# Patient Record
Sex: MALE | Race: WHITE | NOT HISPANIC OR LATINO | Employment: OTHER | URBAN - METROPOLITAN AREA
[De-identification: names, ages, dates, MRNs, and addresses within clinical notes are randomized per-mention and may not be internally consistent; named-entity substitution may affect disease eponyms.]

---

## 2017-01-01 ENCOUNTER — APPOINTMENT (OUTPATIENT)
Dept: RADIATION ONCOLOGY | Facility: HOSPITAL | Age: 62
End: 2017-01-01

## 2017-01-03 ENCOUNTER — INFUSION (OUTPATIENT)
Dept: ONCOLOGY | Facility: HOSPITAL | Age: 62
End: 2017-01-03

## 2017-01-03 ENCOUNTER — OFFICE VISIT (OUTPATIENT)
Dept: ONCOLOGY | Facility: CLINIC | Age: 62
End: 2017-01-03

## 2017-01-03 VITALS
WEIGHT: 239 LBS | RESPIRATION RATE: 16 BRPM | HEART RATE: 72 BPM | SYSTOLIC BLOOD PRESSURE: 120 MMHG | BODY MASS INDEX: 34.22 KG/M2 | TEMPERATURE: 98 F | DIASTOLIC BLOOD PRESSURE: 60 MMHG | HEIGHT: 70 IN

## 2017-01-03 DIAGNOSIS — C34.2 MALIGNANT NEOPLASM OF MIDDLE LOBE OF RIGHT LUNG (HCC): ICD-10-CM

## 2017-01-03 DIAGNOSIS — C34.2 MALIGNANT NEOPLASM OF MIDDLE LOBE OF RIGHT LUNG (HCC): Primary | ICD-10-CM

## 2017-01-03 LAB
ALBUMIN SERPL-MCNC: 3.5 G/DL (ref 3.5–5.2)
ALBUMIN/GLOB SERPL: 0.9 G/DL (ref 1.1–2.4)
ALP SERPL-CCNC: 70 U/L (ref 38–116)
ALT SERPL W P-5'-P-CCNC: 17 U/L (ref 0–41)
ANION GAP SERPL CALCULATED.3IONS-SCNC: 9.4 MMOL/L
AST SERPL-CCNC: 17 U/L (ref 0–40)
BASOPHILS # BLD AUTO: 0.05 10*3/MM3 (ref 0–0.1)
BASOPHILS NFR BLD AUTO: 0.9 % (ref 0–1.1)
BILIRUB SERPL-MCNC: 0.4 MG/DL (ref 0.1–1.2)
BUN BLD-MCNC: 21 MG/DL (ref 6–20)
BUN/CREAT SERPL: 31.3 (ref 7.3–30)
CALCIUM SPEC-SCNC: 9.1 MG/DL (ref 8.5–10.2)
CHLORIDE SERPL-SCNC: 94 MMOL/L (ref 98–107)
CO2 SERPL-SCNC: 34.6 MMOL/L (ref 22–29)
CREAT BLD-MCNC: 0.67 MG/DL (ref 0.7–1.3)
DEPRECATED RDW RBC AUTO: 52.3 FL (ref 37–49)
EOSINOPHIL # BLD AUTO: 0.02 10*3/MM3 (ref 0–0.36)
EOSINOPHIL NFR BLD AUTO: 0.4 % (ref 1–5)
ERYTHROCYTE [DISTWIDTH] IN BLOOD BY AUTOMATED COUNT: 17.7 % (ref 11.7–14.5)
GFR SERPL CREATININE-BSD FRML MDRD: 121 ML/MIN/1.73
GLOBULIN UR ELPH-MCNC: 3.8 GM/DL (ref 1.8–3.5)
GLUCOSE BLD-MCNC: 119 MG/DL (ref 74–124)
HCT VFR BLD AUTO: 43.8 % (ref 40–49)
HGB BLD-MCNC: 14.1 G/DL (ref 13.5–16.5)
IMM GRANULOCYTES # BLD: 0.04 10*3/MM3 (ref 0–0.03)
IMM GRANULOCYTES NFR BLD: 0.8 % (ref 0–0.5)
LYMPHOCYTES # BLD AUTO: 0.54 10*3/MM3 (ref 1–3.5)
LYMPHOCYTES NFR BLD AUTO: 10.2 % (ref 20–49)
MCH RBC QN AUTO: 28.3 PG (ref 27–33)
MCHC RBC AUTO-ENTMCNC: 32.2 G/DL (ref 32–35)
MCV RBC AUTO: 88 FL (ref 83–97)
MONOCYTES # BLD AUTO: 0.77 10*3/MM3 (ref 0.25–0.8)
MONOCYTES NFR BLD AUTO: 14.5 % (ref 4–12)
NEUTROPHILS # BLD AUTO: 3.88 10*3/MM3 (ref 1.5–7)
NEUTROPHILS NFR BLD AUTO: 73.2 % (ref 39–75)
NRBC BLD MANUAL-RTO: 0 /100 WBC (ref 0–0)
PLATELET # BLD AUTO: 319 10*3/MM3 (ref 150–375)
PMV BLD AUTO: 9 FL (ref 8.9–12.1)
POTASSIUM BLD-SCNC: 3.6 MMOL/L (ref 3.5–4.7)
PROT SERPL-MCNC: 7.3 G/DL (ref 6.3–8)
RBC # BLD AUTO: 4.98 10*6/MM3 (ref 4.3–5.5)
SODIUM BLD-SCNC: 138 MMOL/L (ref 134–145)
WBC NRBC COR # BLD: 5.3 10*3/MM3 (ref 4–10)

## 2017-01-03 PROCEDURE — 25010000002 CARBOPLATIN PER 50 MG: Performed by: INTERNAL MEDICINE

## 2017-01-03 PROCEDURE — 25010000002 ETOPOSIDE 100 MG/5ML SOLUTION 50 ML VIAL: Performed by: INTERNAL MEDICINE

## 2017-01-03 PROCEDURE — 96417 CHEMO IV INFUS EACH ADDL SEQ: CPT | Performed by: NURSE PRACTITIONER

## 2017-01-03 PROCEDURE — 96413 CHEMO IV INFUSION 1 HR: CPT | Performed by: NURSE PRACTITIONER

## 2017-01-03 PROCEDURE — 96375 TX/PRO/DX INJ NEW DRUG ADDON: CPT | Performed by: NURSE PRACTITIONER

## 2017-01-03 PROCEDURE — 25010000002 PALONOSETRON PER 25 MCG: Performed by: INTERNAL MEDICINE

## 2017-01-03 PROCEDURE — 80053 COMPREHEN METABOLIC PANEL: CPT | Performed by: INTERNAL MEDICINE

## 2017-01-03 PROCEDURE — 77386: CPT | Performed by: RADIOLOGY

## 2017-01-03 PROCEDURE — 99215 OFFICE O/P EST HI 40 MIN: CPT | Performed by: INTERNAL MEDICINE

## 2017-01-03 PROCEDURE — 25010000002 DEXAMETHASONE SODIUM PHOSPHATE 10 MG/ML SOLUTION 1 ML VIAL: Performed by: INTERNAL MEDICINE

## 2017-01-03 PROCEDURE — 77386 CHG INTENSITY MODULATED RADIATION TX DLVR COMPLEX: CPT | Performed by: RADIOLOGY

## 2017-01-03 PROCEDURE — 85025 COMPLETE CBC W/AUTO DIFF WBC: CPT | Performed by: INTERNAL MEDICINE

## 2017-01-03 RX ORDER — SODIUM CHLORIDE 9 MG/ML
250 INJECTION, SOLUTION INTRAVENOUS ONCE
Status: CANCELLED | OUTPATIENT
Start: 2017-01-03

## 2017-01-03 RX ORDER — PROCHLORPERAZINE MALEATE 10 MG
10 TABLET ORAL EVERY 6 HOURS PRN
Status: CANCELLED | OUTPATIENT
Start: 2017-01-06

## 2017-01-03 RX ORDER — PROCHLORPERAZINE MALEATE 10 MG
10 TABLET ORAL EVERY 6 HOURS PRN
Status: CANCELLED | OUTPATIENT
Start: 2017-01-04

## 2017-01-03 RX ORDER — PALONOSETRON 0.05 MG/ML
0.25 INJECTION, SOLUTION INTRAVENOUS ONCE
Status: COMPLETED | OUTPATIENT
Start: 2017-01-03 | End: 2017-01-03

## 2017-01-03 RX ORDER — SODIUM CHLORIDE 9 MG/ML
250 INJECTION, SOLUTION INTRAVENOUS ONCE
Status: COMPLETED | OUTPATIENT
Start: 2017-01-03 | End: 2017-01-03

## 2017-01-03 RX ORDER — SODIUM CHLORIDE 9 MG/ML
250 INJECTION, SOLUTION INTRAVENOUS ONCE
Status: CANCELLED | OUTPATIENT
Start: 2017-01-06

## 2017-01-03 RX ORDER — SODIUM CHLORIDE 9 MG/ML
250 INJECTION, SOLUTION INTRAVENOUS ONCE
Status: CANCELLED | OUTPATIENT
Start: 2017-01-04

## 2017-01-03 RX ORDER — PALONOSETRON 0.05 MG/ML
0.25 INJECTION, SOLUTION INTRAVENOUS ONCE
Status: CANCELLED | OUTPATIENT
Start: 2017-01-03

## 2017-01-03 RX ORDER — NICOTINE 21 MG/24HR
1 PATCH, TRANSDERMAL 24 HOURS TRANSDERMAL EVERY 24 HOURS
Qty: 30 PATCH | Refills: 0 | Status: SHIPPED | OUTPATIENT
Start: 2017-01-03 | End: 2017-12-03

## 2017-01-03 RX ADMIN — DEXAMETHASONE SODIUM PHOSPHATE 12 MG: 10 INJECTION, SOLUTION INTRAMUSCULAR; INTRAVENOUS at 11:22

## 2017-01-03 RX ADMIN — SODIUM CHLORIDE 250 ML: 900 INJECTION, SOLUTION INTRAVENOUS at 11:22

## 2017-01-03 RX ADMIN — CARBOPLATIN 750 MG: 10 INJECTION, SOLUTION INTRAVENOUS at 12:19

## 2017-01-03 RX ADMIN — PALONOSETRON HYDROCHLORIDE 0.25 MG: 0.25 INJECTION INTRAVENOUS at 11:46

## 2017-01-03 RX ADMIN — ETOPOSIDE 230 MG: 20 INJECTION, SOLUTION, CONCENTRATE INTRAVENOUS at 13:05

## 2017-01-03 NOTE — PROGRESS NOTES
Pt meant to ask Dr. Alvarenga about nicoderm patches.  He states some days he smokes 1ppd and other days he only smokes 10-12 cigarettes.  He has been using  patches, but cannot seem to totally quit.  He is asking if I can send script into pharmacy.  I s/w Dr. Alvarenga and he is ok with me escribing nicoderm 21mg/24hrs.  Escribed #30 to [facility].  Pt aware.  He is aware that after the 30 days, hopefully we can go down to the 14mg/24 hour patches.
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
## 2017-01-04 ENCOUNTER — INFUSION (OUTPATIENT)
Dept: ONCOLOGY | Facility: HOSPITAL | Age: 62
End: 2017-01-04

## 2017-01-04 ENCOUNTER — DOCUMENTATION (OUTPATIENT)
Dept: NUTRITION | Facility: HOSPITAL | Age: 62
End: 2017-01-04

## 2017-01-04 VITALS
SYSTOLIC BLOOD PRESSURE: 106 MMHG | TEMPERATURE: 97.7 F | HEART RATE: 86 BPM | WEIGHT: 238.6 LBS | DIASTOLIC BLOOD PRESSURE: 63 MMHG | BODY MASS INDEX: 34.73 KG/M2

## 2017-01-04 DIAGNOSIS — C34.2 MALIGNANT NEOPLASM OF MIDDLE LOBE OF RIGHT LUNG (HCC): Primary | ICD-10-CM

## 2017-01-04 PROCEDURE — 63710000001 PROCHLORPERAZINE MALEATE PER 10 MG: Performed by: INTERNAL MEDICINE

## 2017-01-04 PROCEDURE — 77386: CPT | Performed by: RADIOLOGY

## 2017-01-04 PROCEDURE — 25010000002 ETOPOSIDE 100 MG/5ML SOLUTION 50 ML VIAL: Performed by: INTERNAL MEDICINE

## 2017-01-04 PROCEDURE — 96413 CHEMO IV INFUSION 1 HR: CPT | Performed by: INTERNAL MEDICINE

## 2017-01-04 PROCEDURE — 77386 CHG INTENSITY MODULATED RADIATION TX DLVR COMPLEX: CPT | Performed by: RADIOLOGY

## 2017-01-04 RX ORDER — PROCHLORPERAZINE MALEATE 10 MG
10 TABLET ORAL EVERY 6 HOURS PRN
Status: DISCONTINUED | OUTPATIENT
Start: 2017-01-04 | End: 2017-01-04 | Stop reason: HOSPADM

## 2017-01-04 RX ORDER — SODIUM CHLORIDE 9 MG/ML
250 INJECTION, SOLUTION INTRAVENOUS ONCE
Status: COMPLETED | OUTPATIENT
Start: 2017-01-04 | End: 2017-01-04

## 2017-01-04 RX ADMIN — ETOPOSIDE 230 MG: 20 INJECTION, SOLUTION, CONCENTRATE INTRAVENOUS at 11:24

## 2017-01-04 RX ADMIN — SODIUM CHLORIDE 250 ML: 900 INJECTION, SOLUTION INTRAVENOUS at 10:59

## 2017-01-04 RX ADMIN — PROCHLORPERAZINE MALEATE 10 MG: 10 TABLET, FILM COATED ORAL at 11:06

## 2017-01-04 NOTE — PROGRESS NOTES
Outpatient Nutrition Services    Patient Name:  Cody Simental  YOB: 1955  MRN: [id number]  Admit Date:  (Not on file)      Weight 238#, decreased 17# x 3 weeks.  The patient states he is not able to eat very much. He c/o pain in his upper gastric region when he swallows, no pain with chewing or swallowing. He is eating soft bland foods,drinking protein shakes  He states he taking the carafate as directed and this provides some relief temporarily. Suggested that he have something to eat or drink ready when he takes this so he can take advantage of the calories/ protein without pain.  XRT has been on hold but has resumed.  Will continue to follow.       Electronically signed by:  Tiffanie Norman RD  01/04/17 4:03 PM

## 2017-01-05 ENCOUNTER — APPOINTMENT (OUTPATIENT)
Dept: ONCOLOGY | Facility: HOSPITAL | Age: 62
End: 2017-01-05

## 2017-01-06 ENCOUNTER — INFUSION (OUTPATIENT)
Dept: ONCOLOGY | Facility: HOSPITAL | Age: 62
End: 2017-01-06

## 2017-01-06 VITALS
DIASTOLIC BLOOD PRESSURE: 58 MMHG | WEIGHT: 232.2 LBS | TEMPERATURE: 98.2 F | HEART RATE: 99 BPM | BODY MASS INDEX: 33.8 KG/M2 | SYSTOLIC BLOOD PRESSURE: 97 MMHG

## 2017-01-06 DIAGNOSIS — C34.2 MALIGNANT NEOPLASM OF MIDDLE LOBE OF RIGHT LUNG (HCC): Primary | ICD-10-CM

## 2017-01-06 PROCEDURE — 25010000002 ETOPOSIDE 100 MG/5ML SOLUTION 50 ML VIAL: Performed by: INTERNAL MEDICINE

## 2017-01-06 PROCEDURE — 77427 RADIATION TX MANAGEMENT X5: CPT | Performed by: RADIOLOGY

## 2017-01-06 PROCEDURE — 77386 CHG INTENSITY MODULATED RADIATION TX DLVR COMPLEX: CPT | Performed by: RADIOLOGY

## 2017-01-06 PROCEDURE — 63710000001 PROCHLORPERAZINE MALEATE PER 10 MG: Performed by: INTERNAL MEDICINE

## 2017-01-06 PROCEDURE — 77386: CPT | Performed by: RADIOLOGY

## 2017-01-06 PROCEDURE — 96413 CHEMO IV INFUSION 1 HR: CPT | Performed by: INTERNAL MEDICINE

## 2017-01-06 PROCEDURE — 96360 HYDRATION IV INFUSION INIT: CPT | Performed by: INTERNAL MEDICINE

## 2017-01-06 RX ORDER — SODIUM CHLORIDE 9 MG/ML
250 INJECTION, SOLUTION INTRAVENOUS ONCE
Status: COMPLETED | OUTPATIENT
Start: 2017-01-06 | End: 2017-01-06

## 2017-01-06 RX ORDER — PROCHLORPERAZINE MALEATE 10 MG
10 TABLET ORAL EVERY 6 HOURS PRN
Status: DISCONTINUED | OUTPATIENT
Start: 2017-01-06 | End: 2017-01-06 | Stop reason: HOSPADM

## 2017-01-06 RX ORDER — DIPHENOXYLATE HYDROCHLORIDE AND ATROPINE SULFATE 2.5; .025 MG/1; MG/1
1 TABLET ORAL 4 TIMES DAILY PRN
Qty: 30 TABLET | Refills: 0 | OUTPATIENT
Start: 2017-01-06 | End: 2017-01-17 | Stop reason: SDUPTHER

## 2017-01-06 RX ADMIN — SODIUM CHLORIDE 250 ML: 900 INJECTION, SOLUTION INTRAVENOUS at 14:40

## 2017-01-06 RX ADMIN — SODIUM CHLORIDE 250 ML: 900 INJECTION, SOLUTION INTRAVENOUS at 15:01

## 2017-01-06 RX ADMIN — PROCHLORPERAZINE MALEATE 10 MG: 10 TABLET, FILM COATED ORAL at 14:49

## 2017-01-06 RX ADMIN — ETOPOSIDE 230 MG: 20 INJECTION, SOLUTION, CONCENTRATE INTRAVENOUS at 15:29

## 2017-01-06 NOTE — PROGRESS NOTES
PT HERE FOR D3 OF -16, HE C/O OF FEELING DIZZY AND LIGHTHEADED BP NOTED AT 97/58, HE DOES TAKE BP MEDS ON A DAILY BASIS WITHOUT CHECKING HIS BP.  HE ALSO REPORTS DIARRHEA, AND IS REQUESTING A REFILL ON HIS LOMOTIL.  HE HAS BEEN ROTATING IMMODIUM AND LOTOMIL.  I SPOKE WITH DR. JACKSON, HE ORDERED ADDITIONAL 500 NS TODAY AND OK TO CALL IN REFILL FOR LOMOTIL.  ORDERED STOOL FOR CDIFF IF PT IS ABLE TO GO WHILE IN CHEMO.  SPOKE WITH PT ABOUT PLANS FOR ADDITIONAL FLUIDS AND NEED FOR STOOL SPECIMEN, HE DOESN'T KNOW THAT HE WILL BE ABLE TO PROVIDE ONE WHILE HERE BUT IF HE HAS THE URGE TO GO HE STATES HE WILL LET ME KNOW.  WILL PROCEED WITH CHEMO AS ORDERED.    BP RE-CHECKED AFTER ADDITIONAL FLUIDS AND NEAR COMPLETION OF VP16 121/69 HR 91, PT STATES HE IS FEELING BETTER, EATING PB CRACKERS AND DRINKING WATER.  NOT ABLE TO PROVIDE STOOL SPECIMEN WHILE IN CHEMO AREA TODAY.  STABLE AT D/C    EDUCATED PT TO CALL FOR ANY CONCERNS, HE V/U

## 2017-01-09 ENCOUNTER — RADIATION ONCOLOGY WEEKLY ASSESSMENT (OUTPATIENT)
Dept: RADIATION ONCOLOGY | Facility: HOSPITAL | Age: 62
End: 2017-01-09

## 2017-01-09 VITALS
BODY MASS INDEX: 33.21 KG/M2 | OXYGEN SATURATION: 94 % | DIASTOLIC BLOOD PRESSURE: 62 MMHG | RESPIRATION RATE: 16 BRPM | SYSTOLIC BLOOD PRESSURE: 100 MMHG | WEIGHT: 232 LBS | TEMPERATURE: 97.2 F | HEIGHT: 70 IN | HEART RATE: 76 BPM

## 2017-01-09 DIAGNOSIS — C34.2 MALIGNANT NEOPLASM OF MIDDLE LOBE OF RIGHT LUNG (HCC): Primary | ICD-10-CM

## 2017-01-09 PROCEDURE — 77386: CPT | Performed by: RADIOLOGY

## 2017-01-09 PROCEDURE — 77386 CHG INTENSITY MODULATED RADIATION TX DLVR COMPLEX: CPT | Performed by: RADIOLOGY
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
## 2017-01-10 ENCOUNTER — OFFICE VISIT (OUTPATIENT)
Dept: ONCOLOGY | Facility: CLINIC | Age: 62
End: 2017-01-10

## 2017-01-10 ENCOUNTER — LAB (OUTPATIENT)
Dept: LAB | Facility: HOSPITAL | Age: 62
End: 2017-01-10

## 2017-01-10 VITALS
WEIGHT: 231.9 LBS | BODY MASS INDEX: 33.2 KG/M2 | HEART RATE: 90 BPM | RESPIRATION RATE: 16 BRPM | DIASTOLIC BLOOD PRESSURE: 60 MMHG | TEMPERATURE: 97.4 F | SYSTOLIC BLOOD PRESSURE: 126 MMHG | HEIGHT: 70 IN | OXYGEN SATURATION: 85 %

## 2017-01-10 DIAGNOSIS — F17.200 CURRENT SMOKER: ICD-10-CM

## 2017-01-10 DIAGNOSIS — C34.2 MALIGNANT NEOPLASM OF MIDDLE LOBE OF RIGHT LUNG (HCC): Primary | ICD-10-CM

## 2017-01-10 DIAGNOSIS — C34.2 MALIGNANT NEOPLASM OF MIDDLE LOBE OF RIGHT LUNG (HCC): ICD-10-CM

## 2017-01-10 LAB
ALBUMIN SERPL-MCNC: 3.7 G/DL (ref 3.5–5.2)
ALBUMIN/GLOB SERPL: 1 G/DL (ref 1.1–2.4)
ALP SERPL-CCNC: 75 U/L (ref 38–116)
ALT SERPL W P-5'-P-CCNC: 19 U/L (ref 0–41)
ANION GAP SERPL CALCULATED.3IONS-SCNC: 11 MMOL/L
AST SERPL-CCNC: 18 U/L (ref 0–40)
BASOPHILS # BLD AUTO: 0.07 10*3/MM3 (ref 0–0.1)
BASOPHILS NFR BLD AUTO: 2.2 % (ref 0–1.1)
BILIRUB SERPL-MCNC: 0.5 MG/DL (ref 0.1–1.2)
BUN BLD-MCNC: 28 MG/DL (ref 6–20)
BUN/CREAT SERPL: 40 (ref 7.3–30)
CALCIUM SPEC-SCNC: 9.6 MG/DL (ref 8.5–10.2)
CHLORIDE SERPL-SCNC: 93 MMOL/L (ref 98–107)
CO2 SERPL-SCNC: 33 MMOL/L (ref 22–29)
CREAT BLD-MCNC: 0.7 MG/DL (ref 0.7–1.3)
DEPRECATED RDW RBC AUTO: 52.8 FL (ref 37–49)
EOSINOPHIL # BLD AUTO: 0.02 10*3/MM3 (ref 0–0.36)
EOSINOPHIL NFR BLD AUTO: 0.6 % (ref 1–5)
ERYTHROCYTE [DISTWIDTH] IN BLOOD BY AUTOMATED COUNT: 17.2 % (ref 11.7–14.5)
GFR SERPL CREATININE-BSD FRML MDRD: 115 ML/MIN/1.73
GLOBULIN UR ELPH-MCNC: 3.7 GM/DL (ref 1.8–3.5)
GLUCOSE BLD-MCNC: 112 MG/DL (ref 74–124)
HCT VFR BLD AUTO: 45.1 % (ref 40–49)
HGB BLD-MCNC: 14.6 G/DL (ref 13.5–16.5)
IMM GRANULOCYTES # BLD: 0.04 10*3/MM3 (ref 0–0.03)
IMM GRANULOCYTES NFR BLD: 1.3 % (ref 0–0.5)
LYMPHOCYTES # BLD AUTO: 0.51 10*3/MM3 (ref 1–3.5)
LYMPHOCYTES NFR BLD AUTO: 16.3 % (ref 20–49)
MCH RBC QN AUTO: 28.4 PG (ref 27–33)
MCHC RBC AUTO-ENTMCNC: 32.4 G/DL (ref 32–35)
MCV RBC AUTO: 87.7 FL (ref 83–97)
MONOCYTES # BLD AUTO: 0.08 10*3/MM3 (ref 0.25–0.8)
MONOCYTES NFR BLD AUTO: 2.6 % (ref 4–12)
NEUTROPHILS # BLD AUTO: 2.4 10*3/MM3 (ref 1.5–7)
NEUTROPHILS NFR BLD AUTO: 77 % (ref 39–75)
NRBC BLD MANUAL-RTO: 0 /100 WBC (ref 0–0)
PLATELET # BLD AUTO: 397 10*3/MM3 (ref 150–375)
PMV BLD AUTO: 8.7 FL (ref 8.9–12.1)
POTASSIUM BLD-SCNC: 3.8 MMOL/L (ref 3.5–4.7)
PROT SERPL-MCNC: 7.4 G/DL (ref 6.3–8)
RBC # BLD AUTO: 5.14 10*6/MM3 (ref 4.3–5.5)
SODIUM BLD-SCNC: 137 MMOL/L (ref 134–145)
WBC NRBC COR # BLD: 3.12 10*3/MM3 (ref 4–10)

## 2017-01-10 PROCEDURE — 80053 COMPREHEN METABOLIC PANEL: CPT

## 2017-01-10 PROCEDURE — 77386: CPT | Performed by: RADIOLOGY

## 2017-01-10 PROCEDURE — 99213 OFFICE O/P EST LOW 20 MIN: CPT | Performed by: NURSE PRACTITIONER

## 2017-01-10 PROCEDURE — 36415 COLL VENOUS BLD VENIPUNCTURE: CPT | Performed by: NURSE PRACTITIONER

## 2017-01-10 PROCEDURE — 77386 CHG INTENSITY MODULATED RADIATION TX DLVR COMPLEX: CPT | Performed by: RADIOLOGY

## 2017-01-10 PROCEDURE — 85025 COMPLETE CBC W/AUTO DIFF WBC: CPT | Performed by: NURSE PRACTITIONER
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
## 2017-01-11 ENCOUNTER — DOCUMENTATION (OUTPATIENT)
Dept: RADIATION ONCOLOGY | Facility: HOSPITAL | Age: 62
End: 2017-01-11

## 2017-01-11 ENCOUNTER — OFFICE VISIT (OUTPATIENT)
Dept: PSYCHIATRY | Facility: HOSPITAL | Age: 62
End: 2017-01-11

## 2017-01-11 DIAGNOSIS — F33.1 MAJOR DEPRESSIVE DISORDER, RECURRENT EPISODE, MODERATE (HCC): Primary | ICD-10-CM

## 2017-01-11 DIAGNOSIS — G47.33 OBSTRUCTIVE SLEEP APNEA, ADULT: ICD-10-CM

## 2017-01-11 DIAGNOSIS — F41.1 GENERALIZED ANXIETY DISORDER: ICD-10-CM

## 2017-01-11 PROCEDURE — 77386 CHG INTENSITY MODULATED RADIATION TX DLVR COMPLEX: CPT | Performed by: RADIOLOGY

## 2017-01-11 PROCEDURE — 90792 PSYCH DIAG EVAL W/MED SRVCS: CPT | Performed by: PSYCHIATRY & NEUROLOGY

## 2017-01-11 PROCEDURE — 77386: CPT | Performed by: RADIOLOGY

## 2017-01-11 RX ORDER — ARMODAFINIL 250 MG/1
250 TABLET ORAL DAILY
Qty: 30 TABLET | Refills: 1 | Status: SHIPPED | OUTPATIENT
Start: 2017-01-11 | End: 2017-01-31 | Stop reason: RX

## 2017-01-11 NOTE — PROGRESS NOTES
Subjective   Patient ID: Cody Simental is a 61 y.o. male is being seen for consultation today at the request of Sowmya Tavera MD.     History of Present Illness   Pt is a 62 yo WM dx with Stage 3 lung cancer, he presents with elevated level of distress having significant impact on overall functional status. He has a moderately elevated PHQ9 = 17 and a FACUNDO 7 of 16.endorsing symptoms of depression, hopelessness, loss of interest and pleasure sleep disturbance, poor appetite with weight loss, difficulty with focus and concentration. He feels nervous, anxious and on edge, endorsing worry, restlessness and irritability.    Pt reports weight loss of 30 lbs, has had difficulty with swallowing.  He has ambivalence about prognostic information, wife wants more prognostic information. They have not had advanced planning discussions. Pts understanding and expectation is that he is being treated with curative intent.   He has a preexisting hx of depression and anxiety, currently taking celexa and xanax prescribed by primary care MD.  He has also a hx of chronic pain symptoms(Hx of 5 back surgeries) and has been on gabapentin up to 800 mg qid. He reports multiple failed medication trials.  Past hx of ETOH dependence  Stressors include pts medical problems as well as those of his wife, both with complex medical and psychiatric issues.      -  The following portions of the patient's history were reviewed and updated as appropriate: He  has a past medical history of Acid reflux; Alcohol abuse; Anxiety; Arthritis; Back pain; Cancer; COPD (chronic obstructive pulmonary disease); Coronary artery disease; Depression; Diabetes mellitus; Erectile dysfunction; Gastritis; History of heart attack (2014); Hypercholesteremia; Hypertension; Lung cancer; Lung cancer; Neuropathy; Sleep apnea; and Tobacco abuse.  He  does not have any pertinent problems on file..  Review of Systems   Constitutional: Positive for activity change, appetite  change, fatigue and unexpected weight change.   HENT: Positive for trouble swallowing.    Psychiatric/Behavioral: Positive for decreased concentration, dysphoric mood and sleep disturbance. Negative for agitation, behavioral problems, confusion, hallucinations, self-injury and suicidal ideas. The patient is nervous/anxious. The patient is not hyperactive.        Objective   Mental Status Exam  Appearance:  clean and casually dressed, appropriate  Attitude toward clinician: Engaging but only talks when being asked a question  cooperative and agreeable   Speech:    Rate:  slow    Volume:  soft   Motor:  no abnormal movements present  Mood:  Sad , says he has no energy  Affect:  dysphoric and anxious  Thought Processes:  linear, logical, and goal directed  Thought Content:  normal  Suicidal Thoughts:  absent  Homicidal Thoughts:  absent  Perceptual Disturbance: no perceptual disturbance  Attention and Concentration:  fair  Insight and Judgement:  limited  Memory:  memory appears to be intact  Physical Exam   Constitutional: He is oriented to person, place, and time. He appears well-developed and well-nourished.   Neurological: He is alert and oriented to person, place, and time.   Psychiatric: Judgment and thought content normal. His mood appears anxious. His affect is angry. His affect is not blunt, not labile and not inappropriate. His speech is not delayed. He is slowed and withdrawn. He is not agitated, not aggressive, not hyperactive and not combative. Cognition and memory are impaired. He exhibits a depressed mood. He exhibits normal remote memory.     Lab Review:   Lab on 01/10/2017   Component Date Value   • Glucose 01/10/2017 112    • BUN 01/10/2017 28*   • Creatinine 01/10/2017 0.70    • Sodium 01/10/2017 137    • Potassium 01/10/2017 3.8    • Chloride 01/10/2017 93*   • CO2 01/10/2017 33.0*   • Calcium 01/10/2017 9.6    • Total Protein 01/10/2017 7.4    • Albumin 01/10/2017 3.70    • ALT (SGPT) 01/10/2017 19     • AST (SGOT) 01/10/2017 18    • Alkaline Phosphatase 01/10/2017 75    • Total Bilirubin 01/10/2017 0.5    • eGFR Non African Amer 01/10/2017 115    • Globulin 01/10/2017 3.7*   • A/G Ratio 01/10/2017 1.0*   • BUN/Creatinine Ratio 01/10/2017 40.0*   • Anion Gap 01/10/2017 11.0    • WBC 01/10/2017 3.12*   • RBC 01/10/2017 5.14    • Hemoglobin 01/10/2017 14.6    • Hematocrit 01/10/2017 45.1    • MCV 01/10/2017 87.7    • MCH 01/10/2017 28.4    • MCHC 01/10/2017 32.4    • RDW 01/10/2017 17.2*   • RDW-SD 01/10/2017 52.8*   • MPV 01/10/2017 8.7*   • Platelets 01/10/2017 397*   • Neutrophil % 01/10/2017 77.0*   • Lymphocyte % 01/10/2017 16.3*   • Monocyte % 01/10/2017 2.6*   • Eosinophil % 01/10/2017 0.6*   • Basophil % 01/10/2017 2.2*   • Immature Grans % 01/10/2017 1.3*   • Neutrophils, Absolute 01/10/2017 2.40    • Lymphocytes, Absolute 01/10/2017 0.51*   • Monocytes, Absolute 01/10/2017 0.08*   • Eosinophils, Absolute 01/10/2017 0.02    • Basophils, Absolute 01/10/2017 0.07    • Immature Grans, Absolute 01/10/2017 0.04*   • nRBC 01/10/2017 0.0    Infusion on 01/03/2017   Component Date Value   • Glucose 01/03/2017 119    • BUN 01/03/2017 21*   • Creatinine 01/03/2017 0.67*   • Sodium 01/03/2017 138    • Potassium 01/03/2017 3.6    • Chloride 01/03/2017 94*   • CO2 01/03/2017 34.6*   • Calcium 01/03/2017 9.1    • Total Protein 01/03/2017 7.3    • Albumin 01/03/2017 3.50    • ALT (SGPT) 01/03/2017 17    • AST (SGOT) 01/03/2017 17    • Alkaline Phosphatase 01/03/2017 70    • Total Bilirubin 01/03/2017 0.4    • eGFR Non African Amer 01/03/2017 121    • Globulin 01/03/2017 3.8*   • A/G Ratio 01/03/2017 0.9*   • BUN/Creatinine Ratio 01/03/2017 31.3*   • Anion Gap 01/03/2017 9.4    • WBC 01/03/2017 5.30    • RBC 01/03/2017 4.98    • Hemoglobin 01/03/2017 14.1    • Hematocrit 01/03/2017 43.8    • MCV 01/03/2017 88.0    • MCH 01/03/2017 28.3    • MCHC 01/03/2017 32.2    • RDW 01/03/2017 17.7*   • RDW-SD 01/03/2017 52.3*   •  MPV 01/03/2017 9.0    • Platelets 01/03/2017 319    • Neutrophil % 01/03/2017 73.2    • Lymphocyte % 01/03/2017 10.2*   • Monocyte % 01/03/2017 14.5*   • Eosinophil % 01/03/2017 0.4*   • Basophil % 01/03/2017 0.9    • Immature Grans % 01/03/2017 0.8*   • Neutrophils, Absolute 01/03/2017 3.88    • Lymphocytes, Absolute 01/03/2017 0.54*   • Monocytes, Absolute 01/03/2017 0.77    • Eosinophils, Absolute 01/03/2017 0.02    • Basophils, Absolute 01/03/2017 0.05    • Immature Grans, Absolute 01/03/2017 0.04*   • nRBC 01/03/2017 0.0    Office Visit on 12/29/2016   Component Date Value   • Magnesium 12/29/2016 1.9    Infusion on 12/29/2016   Component Date Value   • Glucose 12/29/2016 105    • BUN 12/29/2016 17    • Creatinine 12/29/2016 0.73    • Sodium 12/29/2016 139    • Potassium 12/29/2016 3.6    • Chloride 12/29/2016 96*   • CO2 12/29/2016 33.8*   • Calcium 12/29/2016 8.9    • Total Protein 12/29/2016 7.3    • Albumin 12/29/2016 3.60    • ALT (SGPT) 12/29/2016 21    • AST (SGOT) 12/29/2016 19    • Alkaline Phosphatase 12/29/2016 76    • Total Bilirubin 12/29/2016 0.4    • eGFR Non African Amer 12/29/2016 110    • Globulin 12/29/2016 3.7*   • A/G Ratio 12/29/2016 1.0*   • BUN/Creatinine Ratio 12/29/2016 23.3    • Anion Gap 12/29/2016 9.2    • WBC 12/29/2016 2.70*   • RBC 12/29/2016 5.14    • Hemoglobin 12/29/2016 14.6    • Hematocrit 12/29/2016 44.3    • MCV 12/29/2016 86.2    • MCH 12/29/2016 28.4    • MCHC 12/29/2016 33.0    • RDW 12/29/2016 17.1*   • RDW-SD 12/29/2016 46.4    • MPV 12/29/2016 9.4    • Platelets 12/29/2016 97*   • Neutrophil % 12/29/2016 52.3    • Lymphocyte % 12/29/2016 23.0    • Monocyte % 12/29/2016 23.3*   • Eosinophil % 12/29/2016 0.0*   • Basophil % 12/29/2016 0.7    • Immature Grans % 12/29/2016 0.7*   • Neutrophils, Absolute 12/29/2016 1.41*   • Lymphocytes, Absolute 12/29/2016 0.62*   • Monocytes, Absolute 12/29/2016 0.63    • Eosinophils, Absolute 12/29/2016 0.00    • Basophils, Absolute  12/29/2016 0.02    • Immature Grans, Absolute 12/29/2016 0.02    • nRBC 12/29/2016 0.0    Infusion on 12/27/2016   Component Date Value   • Glucose 12/27/2016 117    • BUN 12/27/2016 15    • Creatinine 12/27/2016 0.78    • Sodium 12/27/2016 137    • Potassium 12/27/2016 3.5    • Chloride 12/27/2016 92*   • CO2 12/27/2016 33.0*   • Calcium 12/27/2016 9.4    • Total Protein 12/27/2016 7.5    • Albumin 12/27/2016 4.00    • ALT (SGPT) 12/27/2016 15    • AST (SGOT) 12/27/2016 16    • Alkaline Phosphatase 12/27/2016 80    • Total Bilirubin 12/27/2016 0.7    • eGFR Non African Amer 12/27/2016 102    • Globulin 12/27/2016 3.5    • A/G Ratio 12/27/2016 1.1    • BUN/Creatinine Ratio 12/27/2016 19.2    • Anion Gap 12/27/2016 12.0    • WBC 12/27/2016 1.51*   • RBC 12/27/2016 5.31    • Hemoglobin 12/27/2016 15.1    • Hematocrit 12/27/2016 45.5    • MCV 12/27/2016 85.7    • MCH 12/27/2016 28.4    • MCHC 12/27/2016 33.2    • RDW 12/27/2016 16.8*   • RDW-SD 12/27/2016 45.3    • MPV 12/27/2016 8.9    • Platelets 12/27/2016 75*   • Neutrophil % 12/27/2016 35.7*   • Lymphocyte % 12/27/2016 29.8    • Monocyte % 12/27/2016 31.8*   • Eosinophil % 12/27/2016 0.7*   • Basophil % 12/27/2016 2.0*   • Immature Grans % 12/27/2016 0.0    • Neutrophils, Absolute 12/27/2016 0.54*   • Lymphocytes, Absolute 12/27/2016 0.45*   • Monocytes, Absolute 12/27/2016 0.48    • Eosinophils, Absolute 12/27/2016 0.01    • Basophils, Absolute 12/27/2016 0.03    • Immature Grans, Absolute 12/27/2016 0.00    • nRBC 12/27/2016 0.0    Hospital Outpatient Visit on 12/27/2016   Component Date Value   • Creatinine 12/27/2016 0.80    Lab on 12/19/2016   Component Date Value   • Glucose 12/19/2016 130*   • BUN 12/19/2016 18    • Creatinine 12/19/2016 0.78    • Sodium 12/19/2016 139    • Potassium 12/19/2016 3.6    • Chloride 12/19/2016 94*   • CO2 12/19/2016 33.3*   • Calcium 12/19/2016 8.9    • Total Protein 12/19/2016 7.1    • Albumin 12/19/2016 3.70    • ALT (SGPT)  12/19/2016 11    • AST (SGOT) 12/19/2016 13    • Alkaline Phosphatase 12/19/2016 73    • Total Bilirubin 12/19/2016 0.3    • eGFR Non African Amer 12/19/2016 102    • Globulin 12/19/2016 3.4    • A/G Ratio 12/19/2016 1.1    • BUN/Creatinine Ratio 12/19/2016 23.1    • Anion Gap 12/19/2016 11.7    • WBC 12/19/2016 3.88*   • RBC 12/19/2016 5.29    • Hemoglobin 12/19/2016 14.9    • Hematocrit 12/19/2016 46.8    • MCV 12/19/2016 88.5    • MCH 12/19/2016 28.2    • MCHC 12/19/2016 31.8*   • RDW 12/19/2016 15.1*   • RDW-SD 12/19/2016 47.8    • MPV 12/19/2016 9.1    • Platelets 12/19/2016 200    • Neutrophil % 12/19/2016 79.0*   • Lymphocyte % 12/19/2016 16.2*   • Monocyte % 12/19/2016 1.3*   • Eosinophil % 12/19/2016 0.5*   • Basophil % 12/19/2016 1.5*   • Immature Grans % 12/19/2016 1.5*   • Neutrophils, Absolute 12/19/2016 3.06    • Lymphocytes, Absolute 12/19/2016 0.63*   • Monocytes, Absolute 12/19/2016 0.05*   • Eosinophils, Absolute 12/19/2016 0.02    • Basophils, Absolute 12/19/2016 0.06    • Immature Grans, Absolute 12/19/2016 0.06*   • nRBC 12/19/2016 0.0    Infusion on 12/12/2016   Component Date Value   • Glucose 12/12/2016 119    • BUN 12/12/2016 16    • Creatinine 12/12/2016 0.96    • Sodium 12/12/2016 138    • Potassium 12/12/2016 3.9    • Chloride 12/12/2016 91*   • CO2 12/12/2016 35.5*   • Calcium 12/12/2016 9.7    • Total Protein 12/12/2016 7.9    • Albumin 12/12/2016 3.90    • ALT (SGPT) 12/12/2016 10    • AST (SGOT) 12/12/2016 13    • Alkaline Phosphatase 12/12/2016 87    • Total Bilirubin 12/12/2016 0.3    • eGFR Non African Amer 12/12/2016 80    • Globulin 12/12/2016 4.0*   • A/G Ratio 12/12/2016 1.0*   • BUN/Creatinine Ratio 12/12/2016 16.7    • Anion Gap 12/12/2016 11.5    Lab on 12/12/2016   Component Date Value   • WBC 12/12/2016 5.09    • RBC 12/12/2016 5.44    • Hemoglobin 12/12/2016 15.9    • Hematocrit 12/12/2016 47.8    • MCV 12/12/2016 87.9    • MCH 12/12/2016 29.2    • MCHC 12/12/2016 33.3     • RDW 12/12/2016 15.4*   • RDW-SD 12/12/2016 47.1    • MPV 12/12/2016 9.5    • Platelets 12/12/2016 290    • Neutrophil % 12/12/2016 42.3    • Lymphocyte % 12/12/2016 29.3    • Monocyte % 12/12/2016 24.0*   • Eosinophil % 12/12/2016 1.4    • Basophil % 12/12/2016 1.2*   • Immature Grans % 12/12/2016 1.8*   • Neutrophils, Absolute 12/12/2016 2.16    • Lymphocytes, Absolute 12/12/2016 1.49    • Monocytes, Absolute 12/12/2016 1.22*   • Eosinophils, Absolute 12/12/2016 0.07    • Basophils, Absolute 12/12/2016 0.06    • Immature Grans, Absolute 12/12/2016 0.09*   • nRBC 12/12/2016 0.6*   Lab on 12/05/2016   Component Date Value   • Glucose 12/05/2016 149*   • BUN 12/05/2016 13    • Creatinine 12/05/2016 0.75    • Sodium 12/05/2016 138    • Potassium 12/05/2016 3.9    • Chloride 12/05/2016 92*   • CO2 12/05/2016 36.4*   • Calcium 12/05/2016 8.6    • Total Protein 12/05/2016 7.2    • Albumin 12/05/2016 3.70    • ALT (SGPT) 12/05/2016 10    • AST (SGOT) 12/05/2016 12    • Alkaline Phosphatase 12/05/2016 75    • Total Bilirubin 12/05/2016 0.3    • eGFR Non African Amer 12/05/2016 107    • Globulin 12/05/2016 3.5    • A/G Ratio 12/05/2016 1.1    • BUN/Creatinine Ratio 12/05/2016 17.3    • Anion Gap 12/05/2016 9.6    • WBC 12/05/2016 2.66*   • RBC 12/05/2016 5.12    • Hemoglobin 12/05/2016 14.5    • Hematocrit 12/05/2016 46.2    • MCV 12/05/2016 90.2    • MCH 12/05/2016 28.3    • MCHC 12/05/2016 31.4*   • RDW 12/05/2016 14.2    • RDW-SD 12/05/2016 44.7    • MPV 12/05/2016 9.3    • Platelets 12/05/2016 130*   • Neutrophil % 12/05/2016 38.7*   • Lymphocyte % 12/05/2016 42.5    • Monocyte % 12/05/2016 10.5    • Eosinophil % 12/05/2016 6.4*   • Basophil % 12/05/2016 1.5*   • Immature Grans % 12/05/2016 0.4    • Neutrophils, Absolute 12/05/2016 1.03*   • Lymphocytes, Absolute 12/05/2016 1.13    • Monocytes, Absolute 12/05/2016 0.28    • Eosinophils, Absolute 12/05/2016 0.17    • Basophils, Absolute 12/05/2016 0.04    • Immature  Nitzas, Absolute 12/05/2016 0.01    • nRBC 12/05/2016 0.0    • Extra Tube 12/05/2016 Hold for add-ons.    There may be more visits with results that are not included.    Assessment/Plan      Pt with advanced lung cancer, minimal understanding and insight into prognostic issues, has mixed depression and anxiety, in recovery from ETOH Use Disorder, complex medical hx with chronic pain syndrome    Addresses advanced directives and planning    Reviewed medication regimen and added Nuvigil given symptoms of fatigue excessive daytime drowsiness in presence of Obstructive Sleep APnea dx
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
## 2017-01-12 PROCEDURE — 77386 CHG INTENSITY MODULATED RADIATION TX DLVR COMPLEX: CPT | Performed by: RADIOLOGY

## 2017-01-12 PROCEDURE — 77336 RADIATION PHYSICS CONSULT: CPT | Performed by: RADIOLOGY

## 2017-01-12 PROCEDURE — 77386: CPT | Performed by: RADIOLOGY

## 2017-01-13 PROCEDURE — 77427 RADIATION TX MANAGEMENT X5: CPT | Performed by: RADIOLOGY

## 2017-01-13 PROCEDURE — 77386: CPT | Performed by: RADIOLOGY

## 2017-01-13 PROCEDURE — 77386 CHG INTENSITY MODULATED RADIATION TX DLVR COMPLEX: CPT | Performed by: RADIOLOGY

## 2017-01-16 PROCEDURE — 77386: CPT | Performed by: RADIOLOGY

## 2017-01-16 PROCEDURE — 77386 CHG INTENSITY MODULATED RADIATION TX DLVR COMPLEX: CPT | Performed by: RADIOLOGY

## 2017-01-17 ENCOUNTER — LAB (OUTPATIENT)
Dept: LAB | Facility: HOSPITAL | Age: 62
End: 2017-01-17

## 2017-01-17 ENCOUNTER — RADIATION ONCOLOGY WEEKLY ASSESSMENT (OUTPATIENT)
Dept: RADIATION ONCOLOGY | Facility: HOSPITAL | Age: 62
End: 2017-01-17

## 2017-01-17 ENCOUNTER — CLINICAL SUPPORT (OUTPATIENT)
Dept: ONCOLOGY | Facility: HOSPITAL | Age: 62
End: 2017-01-17

## 2017-01-17 VITALS
BODY MASS INDEX: 33.77 KG/M2 | HEART RATE: 72 BPM | WEIGHT: 232 LBS | TEMPERATURE: 97.1 F | OXYGEN SATURATION: 90 % | RESPIRATION RATE: 16 BRPM | SYSTOLIC BLOOD PRESSURE: 113 MMHG | DIASTOLIC BLOOD PRESSURE: 62 MMHG

## 2017-01-17 DIAGNOSIS — J18.9 PNEUMONIA OF RIGHT LOWER LOBE DUE TO INFECTIOUS ORGANISM: Primary | ICD-10-CM

## 2017-01-17 DIAGNOSIS — C34.2 MALIGNANT NEOPLASM OF MIDDLE LOBE OF RIGHT LUNG (HCC): Primary | ICD-10-CM

## 2017-01-17 LAB
BASOPHILS # BLD AUTO: 0.05 10*3/MM3 (ref 0–0.1)
BASOPHILS NFR BLD AUTO: 1.9 % (ref 0–1.1)
DEPRECATED RDW RBC AUTO: 52.2 FL (ref 37–49)
EOSINOPHIL # BLD AUTO: 0.07 10*3/MM3 (ref 0–0.36)
EOSINOPHIL NFR BLD AUTO: 2.7 % (ref 1–5)
ERYTHROCYTE [DISTWIDTH] IN BLOOD BY AUTOMATED COUNT: 18.5 % (ref 11.7–14.5)
HCT VFR BLD AUTO: 44.2 % (ref 40–49)
HGB BLD-MCNC: 14.8 G/DL (ref 13.5–16.5)
IMM GRANULOCYTES # BLD: 0.05 10*3/MM3 (ref 0–0.03)
IMM GRANULOCYTES NFR BLD: 1.9 % (ref 0–0.5)
LYMPHOCYTES # BLD AUTO: 0.69 10*3/MM3 (ref 1–3.5)
LYMPHOCYTES NFR BLD AUTO: 26.4 % (ref 20–49)
MCH RBC QN AUTO: 28.6 PG (ref 27–33)
MCHC RBC AUTO-ENTMCNC: 33.5 G/DL (ref 32–35)
MCV RBC AUTO: 85.5 FL (ref 83–97)
MONOCYTES # BLD AUTO: 0.62 10*3/MM3 (ref 0.25–0.8)
MONOCYTES NFR BLD AUTO: 23.8 % (ref 4–12)
NEUTROPHILS # BLD AUTO: 1.13 10*3/MM3 (ref 1.5–7)
NEUTROPHILS NFR BLD AUTO: 43.3 % (ref 39–75)
NRBC BLD MANUAL-RTO: 0 /100 WBC (ref 0–0)
PLATELET # BLD AUTO: 134 10*3/MM3 (ref 150–375)
PMV BLD AUTO: 9.6 FL (ref 8.9–12.1)
RBC # BLD AUTO: 5.17 10*6/MM3 (ref 4.3–5.5)
WBC NRBC COR # BLD: 2.61 10*3/MM3 (ref 4–10)

## 2017-01-17 PROCEDURE — 77386: CPT | Performed by: RADIOLOGY

## 2017-01-17 PROCEDURE — 77386 CHG INTENSITY MODULATED RADIATION TX DLVR COMPLEX: CPT | Performed by: RADIOLOGY

## 2017-01-17 PROCEDURE — 85025 COMPLETE CBC W/AUTO DIFF WBC: CPT

## 2017-01-17 PROCEDURE — 36416 COLLJ CAPILLARY BLOOD SPEC: CPT

## 2017-01-17 RX ORDER — SUCRALFATE ORAL 1 G/10ML
1 SUSPENSION ORAL 4 TIMES DAILY
Qty: 420 ML | Refills: 1 | Status: SHIPPED | OUTPATIENT
Start: 2017-01-17 | End: 2017-01-31

## 2017-01-17 RX ORDER — DIPHENOXYLATE HYDROCHLORIDE AND ATROPINE SULFATE 2.5; .025 MG/1; MG/1
1 TABLET ORAL 4 TIMES DAILY PRN
Qty: 30 TABLET | Refills: 1 | OUTPATIENT
Start: 2017-01-17 | End: 2017-01-30 | Stop reason: SDUPTHER

## 2017-01-17 NOTE — PROGRESS NOTES
CBC reviewed with pt. Copy of labs given to pt. WBC 2.61, HGB 14.8, platelets 134, ANC 1.13. Pt had carbo  on 1/3. Pt states he is not eating as well as he wants to be. He states he has indigestion. He is taking prilosec and mylanta. He also states that he has diarrhea about 5-6 times per day. He is taking lomotil and imodium.   Pt is afebrile. Pt is asking how many cycles of chemo he will be receiving?    Temp 98.2, pulse 71, /75    Reviewed above with Dr. Alvarenga. Dr. Alvarenga states to continue what pt is doing at this time. No need for antibiotics. Dr. Alvarenga states that after the next cycle he will order scans, once he reviews those he will decide if pt will get 4 cycles of 6 cycles.    Informed pt. Pt v/u. Pt needs refill on lomotil. New script called in
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
## 2017-01-18 ENCOUNTER — DOCUMENTATION (OUTPATIENT)
Dept: NUTRITION | Facility: HOSPITAL | Age: 62
End: 2017-01-18

## 2017-01-18 PROCEDURE — 77386: CPT | Performed by: RADIOLOGY

## 2017-01-18 PROCEDURE — 77386 CHG INTENSITY MODULATED RADIATION TX DLVR COMPLEX: CPT | Performed by: RADIOLOGY

## 2017-01-18 NOTE — PROGRESS NOTES
Outpatient Oncology Nutrition    Patient Name:  Cody Simental  YOB: 1955  MRN: [id number]    Assessment Date:  1/18/2017        Comments: Weight 231#.  Patient states he is feeling better this week, eating better. No longer drinking the protein shakes because he got tired of them but he is eating most anything he wants. Wants to lose weight when he is finished with treatment. Will continue to follow.             Electronically signed by:  Tiffanie Norman RD  01/18/17 1:31 PM

## 2017-01-19 ENCOUNTER — APPOINTMENT (OUTPATIENT)
Dept: RADIATION ONCOLOGY | Facility: HOSPITAL | Age: 62
End: 2017-01-19

## 2017-01-19 ENCOUNTER — APPOINTMENT (OUTPATIENT)
Dept: PSYCHIATRY | Facility: HOSPITAL | Age: 62
End: 2017-01-19

## 2017-01-19 ENCOUNTER — TELEPHONE (OUTPATIENT)
Dept: PSYCHIATRY | Facility: HOSPITAL | Age: 62
End: 2017-01-19

## 2017-01-19 DIAGNOSIS — C34.2 MALIGNANT NEOPLASM OF MIDDLE LOBE OF RIGHT LUNG (HCC): ICD-10-CM

## 2017-01-19 PROCEDURE — 77386: CPT | Performed by: RADIOLOGY

## 2017-01-19 PROCEDURE — 77386 CHG INTENSITY MODULATED RADIATION TX DLVR COMPLEX: CPT | Performed by: RADIOLOGY

## 2017-01-19 PROCEDURE — 77336 RADIATION PHYSICS CONSULT: CPT | Performed by: RADIOLOGY

## 2017-01-20 PROCEDURE — 77386: CPT | Performed by: RADIOLOGY

## 2017-01-23 ENCOUNTER — RADIATION ONCOLOGY WEEKLY ASSESSMENT (OUTPATIENT)
Dept: RADIATION ONCOLOGY | Facility: HOSPITAL | Age: 62
End: 2017-01-23

## 2017-01-23 VITALS
RESPIRATION RATE: 16 BRPM | TEMPERATURE: 97.5 F | HEIGHT: 70 IN | HEART RATE: 87 BPM | OXYGEN SATURATION: 90 % | WEIGHT: 228 LBS | DIASTOLIC BLOOD PRESSURE: 67 MMHG | BODY MASS INDEX: 32.64 KG/M2 | SYSTOLIC BLOOD PRESSURE: 108 MMHG

## 2017-01-23 DIAGNOSIS — C34.2 MALIGNANT NEOPLASM OF MIDDLE LOBE OF RIGHT LUNG (HCC): Primary | ICD-10-CM

## 2017-01-23 DIAGNOSIS — C34.2 MALIGNANT NEOPLASM MIDDLE LOBE, BRONCHUS OR LUNG: ICD-10-CM

## 2017-01-23 LAB — C DIFF TOX GENS STL QL NAA+PROBE: NEGATIVE

## 2017-01-23 PROCEDURE — 87493 C DIFF AMPLIFIED PROBE: CPT | Performed by: RADIOLOGY

## 2017-01-23 PROCEDURE — 77386: CPT | Performed by: RADIOLOGY

## 2017-01-23 RX ORDER — DIPHENOXYLATE HYDROCHLORIDE AND ATROPINE SULFATE 2.5; .025 MG/1; MG/1
1 TABLET ORAL 4 TIMES DAILY PRN
Qty: 30 TABLET | Refills: 0 | Status: SHIPPED | OUTPATIENT
Start: 2017-01-23 | End: 2017-01-30 | Stop reason: SDUPTHER
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
## 2017-01-24 ENCOUNTER — OFFICE VISIT (OUTPATIENT)
Dept: ONCOLOGY | Facility: CLINIC | Age: 62
End: 2017-01-24

## 2017-01-24 ENCOUNTER — TELEPHONE (OUTPATIENT)
Dept: RADIATION ONCOLOGY | Facility: HOSPITAL | Age: 62
End: 2017-01-24

## 2017-01-24 ENCOUNTER — INFUSION (OUTPATIENT)
Dept: ONCOLOGY | Facility: HOSPITAL | Age: 62
End: 2017-01-24

## 2017-01-24 VITALS
WEIGHT: 227.8 LBS | DIASTOLIC BLOOD PRESSURE: 60 MMHG | BODY MASS INDEX: 32.61 KG/M2 | OXYGEN SATURATION: 90 % | TEMPERATURE: 98.3 F | HEIGHT: 70 IN | HEART RATE: 88 BPM | SYSTOLIC BLOOD PRESSURE: 108 MMHG | RESPIRATION RATE: 18 BRPM

## 2017-01-24 DIAGNOSIS — C34.2 MALIGNANT NEOPLASM OF MIDDLE LOBE OF RIGHT LUNG (HCC): Primary | ICD-10-CM

## 2017-01-24 DIAGNOSIS — C34.2 MALIGNANT NEOPLASM OF MIDDLE LOBE OF RIGHT LUNG (HCC): ICD-10-CM

## 2017-01-24 LAB
ALBUMIN SERPL-MCNC: 3.8 G/DL (ref 3.5–5.2)
ALBUMIN/GLOB SERPL: 1.1 G/DL (ref 1.1–2.4)
ALP SERPL-CCNC: 86 U/L (ref 38–116)
ALT SERPL W P-5'-P-CCNC: 13 U/L (ref 0–41)
ANION GAP SERPL CALCULATED.3IONS-SCNC: 11.4 MMOL/L
AST SERPL-CCNC: 15 U/L (ref 0–40)
BASOPHILS # BLD AUTO: 0.04 10*3/MM3 (ref 0–0.1)
BASOPHILS NFR BLD AUTO: 1.2 % (ref 0–1.1)
BILIRUB SERPL-MCNC: 0.3 MG/DL (ref 0.1–1.2)
BUN BLD-MCNC: 18 MG/DL (ref 6–20)
BUN/CREAT SERPL: 20.2 (ref 7.3–30)
CALCIUM SPEC-SCNC: 9.4 MG/DL (ref 8.5–10.2)
CHLORIDE SERPL-SCNC: 93 MMOL/L (ref 98–107)
CO2 SERPL-SCNC: 34.6 MMOL/L (ref 22–29)
CREAT BLD-MCNC: 0.89 MG/DL (ref 0.7–1.3)
DEPRECATED RDW RBC AUTO: 64 FL (ref 37–49)
EOSINOPHIL # BLD AUTO: 0.03 10*3/MM3 (ref 0–0.36)
EOSINOPHIL NFR BLD AUTO: 0.9 % (ref 1–5)
ERYTHROCYTE [DISTWIDTH] IN BLOOD BY AUTOMATED COUNT: 20.7 % (ref 11.7–14.5)
GFR SERPL CREATININE-BSD FRML MDRD: 87 ML/MIN/1.73
GLOBULIN UR ELPH-MCNC: 3.4 GM/DL (ref 1.8–3.5)
GLUCOSE BLD-MCNC: 113 MG/DL (ref 74–124)
HCT VFR BLD AUTO: 45.2 % (ref 40–49)
HGB BLD-MCNC: 14.8 G/DL (ref 13.5–16.5)
IMM GRANULOCYTES # BLD: 0.01 10*3/MM3 (ref 0–0.03)
IMM GRANULOCYTES NFR BLD: 0.3 % (ref 0–0.5)
LYMPHOCYTES # BLD AUTO: 0.54 10*3/MM3 (ref 1–3.5)
LYMPHOCYTES NFR BLD AUTO: 15.8 % (ref 20–49)
MCH RBC QN AUTO: 29.1 PG (ref 27–33)
MCHC RBC AUTO-ENTMCNC: 32.7 G/DL (ref 32–35)
MCV RBC AUTO: 89 FL (ref 83–97)
MONOCYTES # BLD AUTO: 0.56 10*3/MM3 (ref 0.25–0.8)
MONOCYTES NFR BLD AUTO: 16.4 % (ref 4–12)
NEUTROPHILS # BLD AUTO: 2.24 10*3/MM3 (ref 1.5–7)
NEUTROPHILS NFR BLD AUTO: 65.4 % (ref 39–75)
NRBC BLD MANUAL-RTO: 0 /100 WBC (ref 0–0)
PLATELET # BLD AUTO: 108 10*3/MM3 (ref 150–375)
PMV BLD AUTO: 8.6 FL (ref 8.9–12.1)
POTASSIUM BLD-SCNC: 3.4 MMOL/L (ref 3.5–4.7)
PROT SERPL-MCNC: 7.2 G/DL (ref 6.3–8)
RBC # BLD AUTO: 5.08 10*6/MM3 (ref 4.3–5.5)
SODIUM BLD-SCNC: 139 MMOL/L (ref 134–145)
WBC NRBC COR # BLD: 3.42 10*3/MM3 (ref 4–10)

## 2017-01-24 PROCEDURE — 96375 TX/PRO/DX INJ NEW DRUG ADDON: CPT | Performed by: INTERNAL MEDICINE

## 2017-01-24 PROCEDURE — 80053 COMPREHEN METABOLIC PANEL: CPT

## 2017-01-24 PROCEDURE — 99215 OFFICE O/P EST HI 40 MIN: CPT | Performed by: INTERNAL MEDICINE

## 2017-01-24 PROCEDURE — 36415 COLL VENOUS BLD VENIPUNCTURE: CPT

## 2017-01-24 PROCEDURE — 25010000002 PALONOSETRON PER 25 MCG: Performed by: INTERNAL MEDICINE

## 2017-01-24 PROCEDURE — 96413 CHEMO IV INFUSION 1 HR: CPT | Performed by: INTERNAL MEDICINE

## 2017-01-24 PROCEDURE — 77386: CPT | Performed by: RADIOLOGY

## 2017-01-24 PROCEDURE — 96417 CHEMO IV INFUS EACH ADDL SEQ: CPT | Performed by: INTERNAL MEDICINE

## 2017-01-24 PROCEDURE — 25010000002 ETOPOSIDE 100 MG/5ML SOLUTION 50 ML VIAL: Performed by: INTERNAL MEDICINE

## 2017-01-24 PROCEDURE — 25010000002 CARBOPLATIN PER 50 MG: Performed by: INTERNAL MEDICINE

## 2017-01-24 PROCEDURE — 85025 COMPLETE CBC W/AUTO DIFF WBC: CPT

## 2017-01-24 PROCEDURE — 25010000002 DEXAMETHASONE PER 1 MG: Performed by: INTERNAL MEDICINE

## 2017-01-24 PROCEDURE — 77336 RADIATION PHYSICS CONSULT: CPT | Performed by: RADIOLOGY

## 2017-01-24 RX ORDER — PROCHLORPERAZINE MALEATE 10 MG
10 TABLET ORAL EVERY 6 HOURS PRN
Status: CANCELLED | OUTPATIENT
Start: 2017-01-25

## 2017-01-24 RX ORDER — SODIUM CHLORIDE 9 MG/ML
250 INJECTION, SOLUTION INTRAVENOUS ONCE
Status: CANCELLED | OUTPATIENT
Start: 2017-01-25

## 2017-01-24 RX ORDER — SODIUM CHLORIDE 9 MG/ML
250 INJECTION, SOLUTION INTRAVENOUS ONCE
Status: COMPLETED | OUTPATIENT
Start: 2017-01-24 | End: 2017-01-24

## 2017-01-24 RX ORDER — SODIUM CHLORIDE 9 MG/ML
250 INJECTION, SOLUTION INTRAVENOUS ONCE
Status: CANCELLED | OUTPATIENT
Start: 2017-01-26

## 2017-01-24 RX ORDER — PALONOSETRON 0.05 MG/ML
0.25 INJECTION, SOLUTION INTRAVENOUS ONCE
Status: COMPLETED | OUTPATIENT
Start: 2017-01-24 | End: 2017-01-24

## 2017-01-24 RX ORDER — PALONOSETRON 0.05 MG/ML
0.25 INJECTION, SOLUTION INTRAVENOUS ONCE
Status: CANCELLED | OUTPATIENT
Start: 2017-01-24

## 2017-01-24 RX ORDER — SODIUM CHLORIDE 9 MG/ML
250 INJECTION, SOLUTION INTRAVENOUS ONCE
Status: CANCELLED | OUTPATIENT
Start: 2017-01-24

## 2017-01-24 RX ORDER — PROCHLORPERAZINE MALEATE 10 MG
10 TABLET ORAL EVERY 6 HOURS PRN
Status: CANCELLED | OUTPATIENT
Start: 2017-01-26

## 2017-01-24 RX ADMIN — SODIUM CHLORIDE 250 ML: 900 INJECTION, SOLUTION INTRAVENOUS at 12:00

## 2017-01-24 RX ADMIN — CARBOPLATIN 750 MG: 10 INJECTION, SOLUTION INTRAVENOUS at 12:29

## 2017-01-24 RX ADMIN — ETOPOSIDE 230 MG: 20 INJECTION, SOLUTION, CONCENTRATE INTRAVENOUS at 13:26

## 2017-01-24 RX ADMIN — DEXAMETHASONE SODIUM PHOSPHATE 12 MG: 10 INJECTION INTRAMUSCULAR; INTRAVENOUS at 12:09

## 2017-01-24 RX ADMIN — PALONOSETRON HYDROCHLORIDE 0.25 MG: 0.25 INJECTION INTRAVENOUS at 12:07
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
## 2017-01-24 NOTE — TELEPHONE ENCOUNTER
"Cody Simental wife calls to ask why it was brought up to her  to have radiation to the brain . She asks benefits versus risks and\" is this  Normal to treat this way\" . I suggested it may be better if she spoke with Mr Simental doctors the rationale for treatment and benefits versus side effects. Mrs Simental stated she wanted to be at [facility] today but felt too ill. I let her know I would talk with DR Tavera to find out how to proceed and will inform her. Telephone number verified.  "

## 2017-01-25 ENCOUNTER — INFUSION (OUTPATIENT)
Dept: ONCOLOGY | Facility: HOSPITAL | Age: 62
End: 2017-01-25

## 2017-01-25 ENCOUNTER — OFFICE VISIT (OUTPATIENT)
Dept: ONCOLOGY | Facility: CLINIC | Age: 62
End: 2017-01-25

## 2017-01-25 VITALS
TEMPERATURE: 98 F | SYSTOLIC BLOOD PRESSURE: 100 MMHG | DIASTOLIC BLOOD PRESSURE: 61 MMHG | WEIGHT: 230.4 LBS | BODY MASS INDEX: 33.54 KG/M2 | HEART RATE: 88 BPM

## 2017-01-25 DIAGNOSIS — C34.2 MALIGNANT NEOPLASM OF MIDDLE LOBE OF RIGHT LUNG (HCC): Primary | ICD-10-CM

## 2017-01-25 DIAGNOSIS — R19.7 DIARRHEA, UNSPECIFIED TYPE: Primary | ICD-10-CM

## 2017-01-25 DIAGNOSIS — C34.2 MALIGNANT NEOPLASM OF MIDDLE LOBE OF RIGHT LUNG (HCC): ICD-10-CM

## 2017-01-25 PROCEDURE — 99213 OFFICE O/P EST LOW 20 MIN: CPT | Performed by: NURSE PRACTITIONER

## 2017-01-25 PROCEDURE — 25010000002 ETOPOSIDE 100 MG/5ML SOLUTION 50 ML VIAL: Performed by: INTERNAL MEDICINE

## 2017-01-25 PROCEDURE — 63710000001 PROCHLORPERAZINE MALEATE PER 10 MG: Performed by: INTERNAL MEDICINE

## 2017-01-25 PROCEDURE — 96413 CHEMO IV INFUSION 1 HR: CPT | Performed by: NURSE PRACTITIONER

## 2017-01-25 RX ORDER — PROCHLORPERAZINE MALEATE 10 MG
10 TABLET ORAL EVERY 6 HOURS PRN
Status: DISCONTINUED | OUTPATIENT
Start: 2017-01-25 | End: 2017-01-25 | Stop reason: HOSPADM

## 2017-01-25 RX ORDER — SODIUM CHLORIDE 9 MG/ML
250 INJECTION, SOLUTION INTRAVENOUS ONCE
Status: COMPLETED | OUTPATIENT
Start: 2017-01-25 | End: 2017-01-25

## 2017-01-25 RX ORDER — SODIUM CHLORIDE 9 MG/ML
1000 INJECTION, SOLUTION INTRAVENOUS ONCE
Status: DISCONTINUED | OUTPATIENT
Start: 2017-01-26 | End: 2017-01-25 | Stop reason: HOSPADM

## 2017-01-25 RX ORDER — CHOLESTYRAMINE 4 G/9G
1 POWDER, FOR SUSPENSION ORAL
Qty: 60 PACKET | Refills: 0 | OUTPATIENT
Start: 2017-01-25 | End: 2017-12-08 | Stop reason: HOSPADM

## 2017-01-25 RX ADMIN — SODIUM CHLORIDE 250 ML: 900 INJECTION, SOLUTION INTRAVENOUS at 10:06

## 2017-01-25 RX ADMIN — ETOPOSIDE 230 MG: 20 INJECTION, SOLUTION, CONCENTRATE INTRAVENOUS at 10:39

## 2017-01-25 RX ADMIN — PROCHLORPERAZINE MALEATE 10 MG: 10 TABLET, FILM COATED ORAL at 10:07
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
## 2017-01-25 NOTE — PROGRESS NOTES
Jazmin HOFFMAN on floor to see patient and discuss pts complaints of continued diarrhea, questran samples given to pt and    Pt to return tomorrow for tx and will have labs drawn and extra fluids given

## 2017-01-26 ENCOUNTER — INFUSION (OUTPATIENT)
Dept: ONCOLOGY | Facility: HOSPITAL | Age: 62
End: 2017-01-26

## 2017-01-26 VITALS
HEART RATE: 90 BPM | TEMPERATURE: 98.2 F | WEIGHT: 228.6 LBS | SYSTOLIC BLOOD PRESSURE: 96 MMHG | BODY MASS INDEX: 33.27 KG/M2 | DIASTOLIC BLOOD PRESSURE: 55 MMHG

## 2017-01-26 DIAGNOSIS — C34.2 MALIGNANT NEOPLASM OF MIDDLE LOBE OF RIGHT LUNG (HCC): ICD-10-CM

## 2017-01-26 DIAGNOSIS — E86.0 DEHYDRATION: Primary | ICD-10-CM

## 2017-01-26 DIAGNOSIS — R19.7 DIARRHEA, UNSPECIFIED TYPE: Primary | ICD-10-CM

## 2017-01-26 LAB
ALBUMIN SERPL-MCNC: 3.5 G/DL (ref 3.5–5.2)
ALBUMIN/GLOB SERPL: 1.1 G/DL (ref 1.1–2.4)
ALP SERPL-CCNC: 74 U/L (ref 38–116)
ALT SERPL W P-5'-P-CCNC: 14 U/L (ref 0–41)
ANION GAP SERPL CALCULATED.3IONS-SCNC: 8.4 MMOL/L
AST SERPL-CCNC: 16 U/L (ref 0–40)
BILIRUB SERPL-MCNC: 0.5 MG/DL (ref 0.1–1.2)
BUN BLD-MCNC: 22 MG/DL (ref 6–20)
BUN/CREAT SERPL: 26.5 (ref 7.3–30)
CALCIUM SPEC-SCNC: 9.9 MG/DL (ref 8.5–10.2)
CHLORIDE SERPL-SCNC: 97 MMOL/L (ref 98–107)
CO2 SERPL-SCNC: 34.6 MMOL/L (ref 22–29)
CREAT BLD-MCNC: 0.83 MG/DL (ref 0.7–1.3)
GFR SERPL CREATININE-BSD FRML MDRD: 95 ML/MIN/1.73
GLOBULIN UR ELPH-MCNC: 3.3 GM/DL (ref 1.8–3.5)
GLUCOSE BLD-MCNC: 109 MG/DL (ref 74–124)
MAGNESIUM SERPL-MCNC: 1.8 MG/DL (ref 1.8–2.5)
POTASSIUM BLD-SCNC: 3.8 MMOL/L (ref 3.5–4.7)
PROT SERPL-MCNC: 6.8 G/DL (ref 6.3–8)
SODIUM BLD-SCNC: 140 MMOL/L (ref 134–145)

## 2017-01-26 PROCEDURE — 63710000001 PROCHLORPERAZINE MALEATE PER 10 MG: Performed by: INTERNAL MEDICINE

## 2017-01-26 PROCEDURE — 96413 CHEMO IV INFUSION 1 HR: CPT | Performed by: NURSE PRACTITIONER

## 2017-01-26 PROCEDURE — 83735 ASSAY OF MAGNESIUM: CPT | Performed by: NURSE PRACTITIONER

## 2017-01-26 PROCEDURE — 25010000002 ETOPOSIDE 100 MG/5ML SOLUTION 50 ML VIAL: Performed by: INTERNAL MEDICINE

## 2017-01-26 PROCEDURE — 80053 COMPREHEN METABOLIC PANEL: CPT | Performed by: NURSE PRACTITIONER

## 2017-01-26 PROCEDURE — 96361 HYDRATE IV INFUSION ADD-ON: CPT | Performed by: NURSE PRACTITIONER

## 2017-01-26 PROCEDURE — 36415 COLL VENOUS BLD VENIPUNCTURE: CPT | Performed by: NURSE PRACTITIONER

## 2017-01-26 RX ORDER — METRONIDAZOLE 500 MG/1
500 TABLET ORAL 3 TIMES DAILY
Qty: 30 TABLET | Refills: 0 | Status: SHIPPED | OUTPATIENT
Start: 2017-01-26 | End: 2017-02-06 | Stop reason: SINTOL

## 2017-01-26 RX ORDER — PROCHLORPERAZINE MALEATE 10 MG
10 TABLET ORAL EVERY 6 HOURS PRN
Status: DISCONTINUED | OUTPATIENT
Start: 2017-01-26 | End: 2017-01-26 | Stop reason: HOSPADM

## 2017-01-26 RX ORDER — SODIUM CHLORIDE 9 MG/ML
1000 INJECTION, SOLUTION INTRAVENOUS ONCE
Status: COMPLETED | OUTPATIENT
Start: 2017-01-26 | End: 2017-01-26

## 2017-01-26 RX ORDER — SODIUM CHLORIDE 9 MG/ML
250 INJECTION, SOLUTION INTRAVENOUS ONCE
Status: DISCONTINUED | OUTPATIENT
Start: 2017-01-26 | End: 2017-01-26 | Stop reason: HOSPADM

## 2017-01-26 RX ADMIN — SODIUM CHLORIDE 1000 ML: 900 INJECTION, SOLUTION INTRAVENOUS at 12:07

## 2017-01-26 RX ADMIN — PROCHLORPERAZINE MALEATE 10 MG: 10 TABLET, FILM COATED ORAL at 14:27

## 2017-01-26 RX ADMIN — ETOPOSIDE 230 MG: 20 INJECTION, SOLUTION, CONCENTRATE INTRAVENOUS at 14:30

## 2017-01-26 NOTE — PROGRESS NOTES
"PT CONTINUES TO COMPLAIN OF PERSISTENT DIARRHEA , STATES \"HE HAS HAD  NO RELIEF FROM QUESTRAN GIVEN YESTERDAY\", DISCUSSED WITH DR JACKSON,  PT RECEIVING ONE LITER OF NS TODAY WITH HIS TREATMENT.  DR JACKSON WANTS PATIENT TO HAVE RX OF FLAGYL 500 MG THREE TIMES A DAY FOR 10 DAYS.  AND ONE LITER OF NS TOMORROW AND DAILY  NEXT WEEK UNLESS DIARRHEA RESOVLES  E-SCRIPT TO PTS PHARMACY , [facility].  VERBAL INSTRUCTIONS GIVEN TO PATIENT AND PATIENT V/U.     "

## 2017-01-27 ENCOUNTER — INFUSION (OUTPATIENT)
Dept: ONCOLOGY | Facility: HOSPITAL | Age: 62
End: 2017-01-27

## 2017-01-27 VITALS
TEMPERATURE: 97.8 F | BODY MASS INDEX: 33.83 KG/M2 | WEIGHT: 232.4 LBS | DIASTOLIC BLOOD PRESSURE: 66 MMHG | SYSTOLIC BLOOD PRESSURE: 117 MMHG | HEART RATE: 97 BPM

## 2017-01-27 DIAGNOSIS — C34.2 MALIGNANT NEOPLASM OF MIDDLE LOBE OF RIGHT LUNG (HCC): Primary | ICD-10-CM

## 2017-01-27 PROCEDURE — 96365 THER/PROPH/DIAG IV INF INIT: CPT | Performed by: NURSE PRACTITIONER

## 2017-01-27 PROCEDURE — 96366 THER/PROPH/DIAG IV INF ADDON: CPT | Performed by: NURSE PRACTITIONER

## 2017-01-27 RX ORDER — SODIUM CHLORIDE 9 MG/ML
1000 INJECTION, SOLUTION INTRAVENOUS ONCE
Status: COMPLETED | OUTPATIENT
Start: 2017-01-27 | End: 2017-01-27

## 2017-01-27 RX ADMIN — SODIUM CHLORIDE 1000 ML: 900 INJECTION, SOLUTION INTRAVENOUS at 11:31

## 2017-01-30 ENCOUNTER — TELEPHONE (OUTPATIENT)
Dept: ONCOLOGY | Facility: HOSPITAL | Age: 62
End: 2017-01-30

## 2017-01-30 RX ORDER — DIPHENOXYLATE HYDROCHLORIDE AND ATROPINE SULFATE 2.5; .025 MG/1; MG/1
2 TABLET ORAL 4 TIMES DAILY PRN
Qty: 60 TABLET | Refills: 0 | OUTPATIENT
Start: 2017-01-30 | End: 2017-02-25 | Stop reason: SDUPTHER

## 2017-01-30 NOTE — TELEPHONE ENCOUNTER
Pt calling because he is too sick to come in for his apt for IVF today. He is still having a great deal of diarrhea and is very weak from this. He is on Questran and lomotil. He reports having 7-8 episodes of diarrhea a day. D/W Joanna Rebolledo NP who will be seeing him tomorrow. She v/u, no new orders. Pt called back and requested refill on Lomotil. D/W Jazmin Arteaga NP. Per Jazmin MCCARTHY to refill lomotil. He can take a maximum of 8 tablets a day. Informed pt we would call in to pharmacy.

## 2017-01-30 NOTE — TELEPHONE ENCOUNTER
----- Message from Juanita Chun sent at 1/30/2017  9:56 AM EST -----  Contact: [phone]   Pt is sick will not be at appt .Has appt at 100 for pcp.

## 2017-01-31 ENCOUNTER — INFUSION (OUTPATIENT)
Dept: ONCOLOGY | Facility: HOSPITAL | Age: 62
End: 2017-01-31

## 2017-01-31 ENCOUNTER — OFFICE VISIT (OUTPATIENT)
Dept: ONCOLOGY | Facility: CLINIC | Age: 62
End: 2017-01-31

## 2017-01-31 VITALS
TEMPERATURE: 98 F | HEART RATE: 92 BPM | RESPIRATION RATE: 16 BRPM | HEIGHT: 70 IN | BODY MASS INDEX: 31.61 KG/M2 | DIASTOLIC BLOOD PRESSURE: 60 MMHG | SYSTOLIC BLOOD PRESSURE: 120 MMHG | WEIGHT: 220.8 LBS

## 2017-01-31 DIAGNOSIS — C34.2 MALIGNANT NEOPLASM OF MIDDLE LOBE OF RIGHT LUNG (HCC): Primary | ICD-10-CM

## 2017-01-31 DIAGNOSIS — C34.2 MALIGNANT NEOPLASM OF MIDDLE LOBE OF RIGHT LUNG (HCC): ICD-10-CM

## 2017-01-31 DIAGNOSIS — R19.7 DIARRHEA, UNSPECIFIED TYPE: ICD-10-CM

## 2017-01-31 LAB
ALBUMIN SERPL-MCNC: 3.5 G/DL (ref 3.5–5.2)
ALBUMIN/GLOB SERPL: 1 G/DL (ref 1.1–2.4)
ALP SERPL-CCNC: 75 U/L (ref 38–116)
ALT SERPL W P-5'-P-CCNC: 17 U/L (ref 0–41)
ANION GAP SERPL CALCULATED.3IONS-SCNC: 11.5 MMOL/L
AST SERPL-CCNC: 20 U/L (ref 0–40)
BASOPHILS # BLD AUTO: 0.03 10*3/MM3 (ref 0–0.1)
BASOPHILS NFR BLD AUTO: 1.7 % (ref 0–1.1)
BILIRUB SERPL-MCNC: 0.5 MG/DL (ref 0.1–1.2)
BUN BLD-MCNC: 24 MG/DL (ref 6–20)
BUN/CREAT SERPL: 34.8 (ref 7.3–30)
CALCIUM SPEC-SCNC: 10.1 MG/DL (ref 8.5–10.2)
CHLORIDE SERPL-SCNC: 91 MMOL/L (ref 98–107)
CO2 SERPL-SCNC: 32.5 MMOL/L (ref 22–29)
CREAT BLD-MCNC: 0.69 MG/DL (ref 0.7–1.3)
DEPRECATED RDW RBC AUTO: 62.4 FL (ref 37–49)
EOSINOPHIL # BLD AUTO: 0.02 10*3/MM3 (ref 0–0.36)
EOSINOPHIL NFR BLD AUTO: 1.1 % (ref 1–5)
ERYTHROCYTE [DISTWIDTH] IN BLOOD BY AUTOMATED COUNT: 19.9 % (ref 11.7–14.5)
GFR SERPL CREATININE-BSD FRML MDRD: 117 ML/MIN/1.73
GLOBULIN UR ELPH-MCNC: 3.5 GM/DL (ref 1.8–3.5)
GLUCOSE BLD-MCNC: 115 MG/DL (ref 74–124)
HCT VFR BLD AUTO: 42.1 % (ref 40–49)
HGB BLD-MCNC: 14 G/DL (ref 13.5–16.5)
IMM GRANULOCYTES # BLD: 0.03 10*3/MM3 (ref 0–0.03)
IMM GRANULOCYTES NFR BLD: 1.7 % (ref 0–0.5)
LYMPHOCYTES # BLD AUTO: 0.17 10*3/MM3 (ref 1–3.5)
LYMPHOCYTES NFR BLD AUTO: 9.4 % (ref 20–49)
MCH RBC QN AUTO: 29.3 PG (ref 27–33)
MCHC RBC AUTO-ENTMCNC: 33.3 G/DL (ref 32–35)
MCV RBC AUTO: 88.1 FL (ref 83–97)
MONOCYTES # BLD AUTO: 0.06 10*3/MM3 (ref 0.25–0.8)
MONOCYTES NFR BLD AUTO: 3.3 % (ref 4–12)
NEUTROPHILS # BLD AUTO: 1.5 10*3/MM3 (ref 1.5–7)
NEUTROPHILS NFR BLD AUTO: 82.8 % (ref 39–75)
NRBC BLD MANUAL-RTO: 0 /100 WBC (ref 0–0)
PLATELET # BLD AUTO: 175 10*3/MM3 (ref 150–375)
PMV BLD AUTO: 8.4 FL (ref 8.9–12.1)
POTASSIUM BLD-SCNC: 3.7 MMOL/L (ref 3.5–4.7)
PROT SERPL-MCNC: 7 G/DL (ref 6.3–8)
RBC # BLD AUTO: 4.78 10*6/MM3 (ref 4.3–5.5)
SODIUM BLD-SCNC: 135 MMOL/L (ref 134–145)
WBC NRBC COR # BLD: 1.81 10*3/MM3 (ref 4–10)

## 2017-01-31 PROCEDURE — 85025 COMPLETE CBC W/AUTO DIFF WBC: CPT | Performed by: NURSE PRACTITIONER

## 2017-01-31 PROCEDURE — 80053 COMPREHEN METABOLIC PANEL: CPT | Performed by: NURSE PRACTITIONER

## 2017-01-31 PROCEDURE — 25010000002 DIPHENHYDRAMINE PER 50 MG: Performed by: INTERNAL MEDICINE

## 2017-01-31 PROCEDURE — 36415 COLL VENOUS BLD VENIPUNCTURE: CPT | Performed by: NURSE PRACTITIONER

## 2017-01-31 PROCEDURE — 96374 THER/PROPH/DIAG INJ IV PUSH: CPT | Performed by: NURSE PRACTITIONER

## 2017-01-31 PROCEDURE — 96361 HYDRATE IV INFUSION ADD-ON: CPT | Performed by: NURSE PRACTITIONER

## 2017-01-31 PROCEDURE — 99214 OFFICE O/P EST MOD 30 MIN: CPT | Performed by: NURSE PRACTITIONER

## 2017-01-31 PROCEDURE — 96375 TX/PRO/DX INJ NEW DRUG ADDON: CPT | Performed by: NURSE PRACTITIONER

## 2017-01-31 PROCEDURE — 25010000002 OCTREOTIDE PER 25 MCG: Performed by: NURSE PRACTITIONER

## 2017-01-31 RX ORDER — SODIUM CHLORIDE 9 MG/ML
1000 INJECTION, SOLUTION INTRAVENOUS ONCE
Status: COMPLETED | OUTPATIENT
Start: 2017-01-31 | End: 2017-01-31

## 2017-01-31 RX ORDER — OCTREOTIDE ACETATE 100 UG/ML
100 INJECTION, SOLUTION INTRAVENOUS; SUBCUTANEOUS ONCE
Status: CANCELLED
Start: 2017-01-31 | End: 2017-01-31

## 2017-01-31 RX ORDER — DIPHENHYDRAMINE HYDROCHLORIDE 50 MG/ML
25 INJECTION INTRAMUSCULAR; INTRAVENOUS ONCE
Status: COMPLETED | OUTPATIENT
Start: 2017-01-31 | End: 2017-01-31

## 2017-01-31 RX ORDER — POTASSIUM CHLORIDE 20 MEQ/1
TABLET, EXTENDED RELEASE ORAL
COMMUNITY
Start: 2017-01-30 | End: 2017-01-31

## 2017-01-31 RX ORDER — OCTREOTIDE ACETATE 100 UG/ML
100 INJECTION, SOLUTION INTRAVENOUS; SUBCUTANEOUS ONCE
Status: COMPLETED | OUTPATIENT
Start: 2017-01-31 | End: 2017-01-31

## 2017-01-31 RX ADMIN — DIPHENHYDRAMINE HYDROCHLORIDE 25 MG: 50 INJECTION INTRAMUSCULAR; INTRAVENOUS at 13:00

## 2017-01-31 RX ADMIN — OCTREOTIDE ACETATE 100 MCG: 100 INJECTION, SOLUTION INTRAVENOUS; SUBCUTANEOUS at 12:55

## 2017-01-31 RX ADMIN — SODIUM CHLORIDE 1000 ML: 900 INJECTION, SOLUTION INTRAVENOUS at 11:50
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
## 2017-01-31 NOTE — MR AVS SNAPSHOT
oCdy Simental   1/31/2017 10:40 AM   Office Visit    Dept Phone:  [phone]   Encounter #:  [id number]    Provider:  YASMIN Neumann   Department:  [facility] CBC GROUP: CONSULTANTS IN BLOOD DISORDERS AND CANCER                Your Full Care Plan              Today's Medication Changes          These changes are accurate as of: 1/31/17 12:27 PM.  If you have any questions, ask your nurse or doctor.               Medication(s)that have changed:     KLOR-CON 20 MEQ CR tablet   Generic drug:  potassium chloride   Take 40 mEq by mouth 2 (two) times a day.   What changed:  Another medication with the same name was removed. Continue taking this medication, and follow the directions you see here.       sucralfate 1 G tablet   Commonly known as:  CARAFATE   Take 1 tablet by mouth 4 (Four) Times a Day.   What changed:  Another medication with the same name was removed. Continue taking this medication, and follow the directions you see here.   Changed by:  YASMIN Neumann         Stop taking medication(s)listed here:     Armodafinil 250 MG tablet   Stopped by:  YASMIN Neumann                      Your Updated Medication List          This list is accurate as of: 1/31/17 12:27 PM.  Always use your most recent med list.                albuterol 108 (90 BASE) MCG/ACT inhaler   Commonly known as:  PROVENTIL HFA;VENTOLIN HFA       ALPRAZolam 0.5 MG tablet   Commonly known as:  XANAX       aspirin 81 MG EC tablet       budesonide-formoterol 160-4.5 MCG/ACT inhaler   Commonly known as:  SYMBICORT       carvedilol 6.25 MG tablet   Commonly known as:  COREG       cholestyramine 4 G packet   Commonly known as:  QUESTRAN   Take 1 packet by mouth 3 (Three) Times a Day With Meals. 1 packet  TID PRN diarrhea       citalopram 20 MG tablet   Commonly known as:  CeleXA   Take 1 tablet by mouth Daily.       diphenoxylate-atropine 2.5-0.025 MG per tablet   Commonly known as:  LOMOTIL    Take 2 tablets by mouth 4 (Four) Times a Day As Needed for diarrhea. Maximum 8 tablets in a 24 hour period.       furosemide 80 MG tablet   Commonly known as:  LASIX       * gabapentin 800 MG tablet   Commonly known as:  NEURONTIN       * gabapentin 400 MG capsule   Commonly known as:  NEURONTIN       HYDROmorphone 4 MG tablet   Commonly known as:  DILAUDID       IMODIUM PO       ipratropium-albuterol 0.5-2.5 mg/mL nebulizer   Commonly known as:  DUO-NEB       KLOR-CON 20 MEQ CR tablet   Generic drug:  potassium chloride       losartan 25 MG tablet   Commonly known as:  COZAAR       METFORMIN HCL ER (OSM) PO       metroNIDAZOLE 500 MG tablet   Commonly known as:  FLAGYL   Take 1 tablet by mouth 3 (Three) Times a Day for 10 days.       nicotine 21 MG/24HR patch   Commonly known as:  NICODERM CQ   Place 1 patch on the skin Daily.       nystatin [id number] UNIT/ML suspension   Commonly known as:  MYCOSTATIN       O2   Commonly known as:  OXYGEN       omeprazole 40 MG capsule   Commonly known as:  priLOSEC       ondansetron 8 MG tablet   Commonly known as:  ZOFRAN   Take 1 tablet by mouth Every 8 (Eight) Hours As Needed for nausea or vomiting.       prochlorperazine 10 MG tablet   Commonly known as:  COMPAZINE   Take 1 tablet by mouth Every 6 (Six) Hours As Needed for nausea or vomiting.       sucralfate 1 G tablet   Commonly known as:  CARAFATE   Take 1 tablet by mouth 4 (Four) Times a Day.       tamsulosin 0.4 MG capsule 24 hr capsule   Commonly known as:  FLOMAX       Testosterone Enanthate 200 MG/ML solution       UNKNOWN TO PATIENT       ZETIA 10 MG tablet   Generic drug:  ezetimibe       * Notice:  This list has 2 medication(s) that are the same as other medications prescribed for you. Read the directions carefully, and ask your doctor or other care provider to review them with you.            We Performed the Following     Ambulatory Referral to Gastroenterology       You Were Diagnosed With        Codes Comments     Malignant neoplasm of middle lobe of right lung    -  Primary ICD-10-CM: C34.2  ICD-9-CM: 162.4     Diarrhea, unspecified type     ICD-10-CM: R19.7  ICD-9-CM: 787.91       Medications to be Given to You by a Medical Professional     Due       Frequency    (none) heparin injection 500 Units  Every 8 Hours PRN      Instructions     None    Patient Instructions History      Upcoming Appointments     Visit Type Date Time Department    FOLLOW UP 1 UNIT 1/31/2017 10:40 AM [facility]C [facility]    PORT FLUSH 1/31/2017 10:15 AM [facility] IN[facility]    INFUSION 2 HR 1/31/2017 11:00 AM [facility] INFU [facility]    FOLLOW UP 2/1/2017 12:00 PM [facility]    INFUSION 2 HR 2/1/2017 10:30 AM [facility] INFU [facility]    INFUSION 2 HR 2/2/2017  1:00 PM [facility] IN[facility]    INFUSION 2 HR 2/3/2017 11:00 AM [facility] IN[facility]    FOLLOW UP 1 UNIT 2/6/2017 10:40 AM [facility]    LAB 2/6/2017 10:00 AM [facility] LAB [facility]    INFUSION 2 HR 2/6/2017 11:00 AM [facility] INFU [facility]    OFFICE VISIT 2/23/2017 12:45 PM [facility] PC [facility] 1 4003    FOLLOW UP - ONCOLOGY 2/27/2017 11:45 AM [facility] RAD ONC [facility]      MyChart Signup     Our records indicate that you have an active [employer] account.    You can view your After Visit Summary by going to [employer] and logging in with your [employer] username and password.  If you don't have a Read[employer] username and password but a parent or guardian has access to your record, the parent or guardian should login with their own Read[employer] username and password and access your record to view the After Visit Summary.    If you have questions, you can email [email or web address] or call [phone] to talk to our Read[employer] staff.  Remember, [employer] is NOT to be used for urgent needs.  For medical emergencies, dial 911.               Other Info from Your Visit           Your Appointments     Feb 01, 2017 10:30 AM EST   INFUSION with CHAIR 01 DOMINICK VARGAS  "[facility] ([place])    [address] [address] [phone]            Feb 01, 2017 12:00 PM EST   Follow Up with Radha Dey MD   [facility] ([place])    [address] [place] [address] [phone]           Arrive 15 minutes prior to appointment.            Feb 02, 2017  1:00 PM EST   INFUSION with CHAIR 07 DOMINICK Rosario MD   [facility] INF[facility] ([place])    [address] [place] [id number] [phone]            Feb 03, 2017 11:00 AM EST   INFUSION with CHAIR 01 DOMINICK Rosario LG   [facility] INF[facility] ([place])    [address] [place] [id number] [phone]            Feb 06, 2017 10:40 AM EST   FOLLOW UP with YASMIN Neumann   [facility] CBC GROUP: CONSULTANTS IN BLOOD DISORDERS AND CANCER ([facility])    [address] [place] [id number] [phone]              Allergies     Atorvastatin  Other (See Comments)    myalgias    Simvastatin  Other (See Comments)    myalgias    Baclofen  Mental Status Change    \"MAKES ME CRAZY\"    Lyrica [Pregabalin]  Other (See Comments)    Pt doesn't remember      Reason for Visit     Follow-up trouble eating and drinking/diarrhea      Vital Signs     Blood Pressure Pulse Temperature Respirations Height Weight    120/60 92 98 °F (36.7 °C) 16 69.5\" (176.5 cm) 220 lb 12.8 oz (100 kg)    Body Mass Index Smoking Status                32.14 kg/m2 Current Every Day Smoker          Problems and Diagnoses Noted     Diarrhea    Lung cancer        "

## 2017-01-31 NOTE — PROGRESS NOTES
Pt was given 100mcg octreotide slow IVP per order by Lauren Leon RN.  Pt, becamed flushed and nauseated. This nurse with Lauren at chairside. /99, O2sat 88% on RA- placed pt on 2L O2 per nasal cannula.  Dr. Alvarenga to chairside- ordered 25mg IV benadryl. 25 mg given slow IV push. Symptoms resolved. /88, O2 93% on 2 Per Dr. Alvarenga, pt not to receive any further octreotide.

## 2017-02-01 ENCOUNTER — INFUSION (OUTPATIENT)
Dept: ONCOLOGY | Facility: HOSPITAL | Age: 62
End: 2017-02-01

## 2017-02-01 VITALS
TEMPERATURE: 98 F | HEART RATE: 90 BPM | BODY MASS INDEX: 32.14 KG/M2 | SYSTOLIC BLOOD PRESSURE: 94 MMHG | WEIGHT: 220.8 LBS | DIASTOLIC BLOOD PRESSURE: 59 MMHG

## 2017-02-01 DIAGNOSIS — C34.2 MALIGNANT NEOPLASM OF MIDDLE LOBE OF RIGHT LUNG (HCC): Primary | ICD-10-CM

## 2017-02-01 PROCEDURE — 25010000002 GRANISETRON PER 100 MCG: Performed by: NURSE PRACTITIONER

## 2017-02-01 PROCEDURE — 96361 HYDRATE IV INFUSION ADD-ON: CPT | Performed by: NURSE PRACTITIONER

## 2017-02-01 PROCEDURE — 96360 HYDRATION IV INFUSION INIT: CPT | Performed by: NURSE PRACTITIONER

## 2017-02-01 PROCEDURE — 96375 TX/PRO/DX INJ NEW DRUG ADDON: CPT | Performed by: NURSE PRACTITIONER

## 2017-02-01 RX ORDER — GRANISETRON HYDROCHLORIDE 1 MG/ML
1 INJECTION INTRAVENOUS ONCE
Status: COMPLETED | OUTPATIENT
Start: 2017-02-01 | End: 2017-02-01

## 2017-02-01 RX ORDER — SODIUM CHLORIDE 9 MG/ML
1000 INJECTION, SOLUTION INTRAVENOUS ONCE
Status: COMPLETED | OUTPATIENT
Start: 2017-02-01 | End: 2017-02-01

## 2017-02-01 RX ADMIN — SODIUM CHLORIDE 1000 ML: 900 INJECTION, SOLUTION INTRAVENOUS at 11:11

## 2017-02-01 RX ADMIN — GRANISETRON HYDROCHLORIDE 1 MG: 1 INJECTION INTRAVENOUS at 11:53

## 2017-02-02 ENCOUNTER — INFUSION (OUTPATIENT)
Dept: ONCOLOGY | Facility: HOSPITAL | Age: 62
End: 2017-02-02

## 2017-02-02 VITALS
BODY MASS INDEX: 32.14 KG/M2 | DIASTOLIC BLOOD PRESSURE: 54 MMHG | SYSTOLIC BLOOD PRESSURE: 92 MMHG | WEIGHT: 220.8 LBS | HEART RATE: 98 BPM | TEMPERATURE: 98.5 F

## 2017-02-02 DIAGNOSIS — C34.2 MALIGNANT NEOPLASM OF MIDDLE LOBE OF RIGHT LUNG (HCC): Primary | ICD-10-CM

## 2017-02-02 PROCEDURE — 96360 HYDRATION IV INFUSION INIT: CPT | Performed by: NURSE PRACTITIONER

## 2017-02-02 PROCEDURE — 96361 HYDRATE IV INFUSION ADD-ON: CPT | Performed by: NURSE PRACTITIONER

## 2017-02-02 RX ORDER — SODIUM CHLORIDE 9 MG/ML
1000 INJECTION, SOLUTION INTRAVENOUS ONCE
Status: COMPLETED | OUTPATIENT
Start: 2017-02-02 | End: 2017-02-02

## 2017-02-02 RX ADMIN — SODIUM CHLORIDE 1000 ML: 900 INJECTION, SOLUTION INTRAVENOUS at 09:10

## 2017-02-03 ENCOUNTER — INFUSION (OUTPATIENT)
Dept: ONCOLOGY | Facility: HOSPITAL | Age: 62
End: 2017-02-03

## 2017-02-03 ENCOUNTER — OFFICE VISIT (OUTPATIENT)
Dept: GASTROENTEROLOGY | Facility: CLINIC | Age: 62
End: 2017-02-03

## 2017-02-03 VITALS
TEMPERATURE: 97.4 F | SYSTOLIC BLOOD PRESSURE: 96 MMHG | DIASTOLIC BLOOD PRESSURE: 58 MMHG | HEART RATE: 91 BPM | WEIGHT: 221.2 LBS | BODY MASS INDEX: 30.85 KG/M2

## 2017-02-03 VITALS — BODY MASS INDEX: 31.05 KG/M2 | HEIGHT: 71 IN | WEIGHT: 221.8 LBS

## 2017-02-03 DIAGNOSIS — C34.2 MALIGNANT NEOPLASM OF MIDDLE LOBE OF RIGHT LUNG (HCC): Primary | ICD-10-CM

## 2017-02-03 DIAGNOSIS — R19.7 DIARRHEA, UNSPECIFIED TYPE: Primary | ICD-10-CM

## 2017-02-03 PROCEDURE — 96361 HYDRATE IV INFUSION ADD-ON: CPT | Performed by: NURSE PRACTITIONER

## 2017-02-03 PROCEDURE — 96360 HYDRATION IV INFUSION INIT: CPT | Performed by: NURSE PRACTITIONER

## 2017-02-03 PROCEDURE — 99203 OFFICE O/P NEW LOW 30 MIN: CPT | Performed by: INTERNAL MEDICINE

## 2017-02-03 RX ORDER — SODIUM CHLORIDE 0.9 % (FLUSH) 0.9 %
1-10 SYRINGE (ML) INJECTION AS NEEDED
Status: CANCELLED | OUTPATIENT
Start: 2017-02-03

## 2017-02-03 RX ORDER — SODIUM CHLORIDE 9 MG/ML
1000 INJECTION, SOLUTION INTRAVENOUS ONCE
Status: COMPLETED | OUTPATIENT
Start: 2017-02-03 | End: 2017-02-03

## 2017-02-03 RX ORDER — SODIUM CHLORIDE, SODIUM LACTATE, POTASSIUM CHLORIDE, CALCIUM CHLORIDE 600; 310; 30; 20 MG/100ML; MG/100ML; MG/100ML; MG/100ML
30 INJECTION, SOLUTION INTRAVENOUS CONTINUOUS
Status: CANCELLED | OUTPATIENT
Start: 2017-02-03

## 2017-02-03 RX ORDER — ESOMEPRAZOLE MAGNESIUM 40 MG/1
40 FOR SUSPENSION ORAL
Qty: 90 MG | Refills: 3 | Status: SHIPPED | OUTPATIENT
Start: 2017-02-03 | End: 2017-03-08 | Stop reason: SDUPTHER

## 2017-02-03 RX ORDER — SUCRALFATE ORAL 1 G/10ML
1 SUSPENSION ORAL 4 TIMES DAILY
Qty: 420 EACH | Refills: 2 | Status: SHIPPED | OUTPATIENT
Start: 2017-02-03 | End: 2017-03-08

## 2017-02-03 RX ADMIN — SODIUM CHLORIDE 1000 ML: 900 INJECTION, SOLUTION INTRAVENOUS at 11:13

## 2017-02-03 NOTE — PROGRESS NOTES
"Chief Complaint   Patient presents with   • Diarrhea   • Nausea   • Vomiting     Subjective      HPI     Cody Simental is a 61 y.o. male who presents for evaluation of diarrhea.  Pt has hx of Stage III SC lung CA s/p chemo/XRT.  He has been having diarrhea off/on since initiation of chemotherapy.  Diarrhea usually starts on 1st day of his 3 day cycle and last for 3-4 days afterwards.  Last dose of chemo 1 week ago. Symptoms seem to be worse after his last cycle and have not relented as they usually do.  He is having frequent watery stools up to 4-5 times/day.  He denies melena or hematochezia.  Current regimen is lomotil (approx 8/day), and about 8 immodium/day as well.  He is also on questran 4gm 2-3 times/day.  He has had negative stool for C diff.  He did undergoe empiric treatment with Flagy but this only causes nausea and did not alter his diarrhea.      He has also been having issues with \"sour\" stomach and some odynophagia.  He started carafate 2 days ago which seems to be helping.  He is on as needed oxygen at home, up to 3L.  He had colonoscopy about 9mos ago with Dr. Palomares which he reports was normal.  He is diabetic and is noted to be on Metformin for last year.  He has had weight loss of about 15lbs since he started chemotherapy.  He continues to smoke.        Past Medical History   Diagnosis Date   • Acid reflux    • Alcohol abuse      resolved   • Anxiety    • Arthritis    • Back pain    • Cancer      Right lung, middle lobe   • COPD (chronic obstructive pulmonary disease)    • Coronary artery disease      MI in 2014   • Depression    • Diabetes mellitus      Type 1   • Erectile dysfunction    • Gastritis    • History of heart attack 2014   • Hypercholesteremia    • Hypertension    • Lung cancer    • Lung cancer    • Neuropathy    • Sleep apnea      WEARS CPAP   • Tobacco abuse        Current Outpatient Prescriptions:   •  albuterol (PROVENTIL HFA;VENTOLIN HFA) 108 (90 BASE) MCG/ACT inhaler, Inhale 2 " puffs Every 4 (Four) Hours As Needed for wheezing., Disp: , Rfl:   •  ALPRAZolam (XANAX) 0.5 MG tablet, Take 0.5 mg by mouth 2 (two) times a day as needed for anxiety., Disp: , Rfl:   •  aspirin 81 MG EC tablet, Take 81 mg by mouth Daily., Disp: , Rfl:   •  budesonide-formoterol (SYMBICORT) 160-4.5 MCG/ACT inhaler, Inhale 2 puffs 2 (Two) Times a Day., Disp: , Rfl:   •  carvedilol (COREG) 6.25 MG tablet, Take 6.25 mg by mouth 2 (two) times a day with meals., Disp: , Rfl:   •  cholestyramine (QUESTRAN) 4 G packet, Take 1 packet by mouth 3 (Three) Times a Day With Meals. 1 packet  TID PRN diarrhea, Disp: 60 packet, Rfl: 0  •  citalopram (CeleXA) 20 MG tablet, Take 1 tablet by mouth Daily., Disp: 30 tablet, Rfl: 5  •  diphenoxylate-atropine (LOMOTIL) 2.5-0.025 MG per tablet, Take 2 tablets by mouth 4 (Four) Times a Day As Needed for diarrhea. Maximum 8 tablets in a 24 hour period., Disp: 60 tablet, Rfl: 0  •  furosemide (LASIX) 80 MG tablet, Take 160 mg by mouth 2 (two) times a day., Disp: , Rfl:   •  gabapentin (NEURONTIN) 400 MG capsule, Take 400 mg by mouth 4 (Four) Times a Day. In addition to 800mg tabs, pt reports is a separate prescription, Disp: , Rfl:   •  gabapentin (NEURONTIN) 800 MG tablet, Take 800 mg by mouth 4 (four) times a day., Disp: , Rfl:   •  HYDROmorphone (DILAUDID) 4 MG tablet, Take 8 mg by mouth Every 3 (Three) Hours As Needed., Disp: , Rfl:   •  ipratropium-albuterol (DUO-NEB) 0.5-2.5 mg/mL nebulizer, Take 3 mL by nebulization 4 (Four) Times a Day., Disp: , Rfl:   •  Loperamide HCl (IMODIUM PO), Take  by mouth 2 (Two) Times a Day., Disp: , Rfl:   •  losartan (COZAAR) 25 MG tablet, Take 25 mg by mouth 2 (Two) Times a Day., Disp: , Rfl:   •  METFORMIN HCL ER, OSM, PO, Take 1,000 mg by mouth 2 (two) times a day., Disp: , Rfl:   •  nicotine (NICODERM CQ) 21 MG/24HR patch, Place 1 patch on the skin Daily., Disp: 30 patch, Rfl: 0  •  nystatin (MYCOSTATIN) 962696 UNIT/ML suspension, , Disp: , Rfl:   •   "O2 (OXYGEN), Inhale 3 L/min As Needed., Disp: , Rfl:   •  ondansetron (ZOFRAN) 8 MG tablet, Take 1 tablet by mouth Every 8 (Eight) Hours As Needed for nausea or vomiting., Disp: 30 tablet, Rfl: 2  •  potassium chloride (KLOR-CON) 20 MEQ CR tablet, Take 40 mEq by mouth 2 (two) times a day., Disp: , Rfl:   •  prochlorperazine (COMPAZINE) 10 MG tablet, Take 1 tablet by mouth Every 6 (Six) Hours As Needed for nausea or vomiting., Disp: 30 tablet, Rfl: 0  •  tamsulosin (FLOMAX) 0.4 MG capsule 24 hr capsule, Take 0.4 mg by mouth 2 (Two) Times a Day., Disp: , Rfl:   •  Testosterone Enanthate 200 MG/ML solution, Inject 1 mL into the shoulder, thigh, or buttocks Take As Directed. Every 10 days, Disp: , Rfl:   •  UNKNOWN TO PATIENT, Continuous. Pain pump Filled 11-17-16 with dilaudid per pt report, Disp: , Rfl:   •  esomeprazole (NexIUM) 40 MG packet, Take 40 mg by mouth Every Morning Before Breakfast., Disp: 90 mg, Rfl: 3  •  metroNIDAZOLE (FLAGYL) 500 MG tablet, Take 1 tablet by mouth 3 (Three) Times a Day for 10 days., Disp: 30 tablet, Rfl: 0  •  sucralfate (CARAFATE) 1 GM/10ML suspension, Take 10 mL by mouth 4 (Four) Times a Day., Disp: 420 each, Rfl: 2  •  ZETIA 10 MG tablet, Take 10 mg by mouth Daily., Disp: , Rfl:     Current Facility-Administered Medications:   •  heparin injection 500 Units, 5 mL, Intravenous, Q8H PRN, Cody Alvarenga II, MD, 500 Units at 11/24/16 1229  Allergies   Allergen Reactions   • Atorvastatin Other (See Comments)     myalgias   • Simvastatin Other (See Comments)     myalgias   • Baclofen Mental Status Change     \"MAKES ME CRAZY\"   • Lyrica [Pregabalin] Other (See Comments)     Pt doesn't remember     Social History     Social History   • Marital status:      Spouse name: Claudette   • Number of children: N/A   • Years of education: N/A     Occupational History   •  Retired     Social History Main Topics   • Smoking status: Current Every Day Smoker     Packs/day: 0.25     Years: 40.00 "     Types: Cigarettes   • Smokeless tobacco: Never Used   • Alcohol use No      Comment: As of 2016 sober 5 years; prior alcoholism   • Drug use: No   • Sexual activity: Defer     Other Topics Concern   • Not on file     Social History Narrative    Disabled kimberli     Family History   Problem Relation Age of Onset   • Stroke Mother    • Depression Mother    • Heart attack Father    • Depression Sister    • Breast cancer Sister 60   • Lung disease Other    • Alcohol abuse Other    • Kidney cancer Brother 65     Review of Systems   Constitutional: Positive for fatigue and unexpected weight change.   HENT: Positive for sore throat and trouble swallowing.    Respiratory: Negative.    Cardiovascular: Negative.    Gastrointestinal: Positive for diarrhea.   Musculoskeletal: Negative.    Skin: Negative.    Allergic/Immunologic: Negative.    Neurological: Positive for numbness.   Psychiatric/Behavioral: Negative.         Objective      There were no vitals filed for this visit.     Physical Exam   Constitutional: He is oriented to person, place, and time. He appears well-developed and well-nourished.   HENT:   Head: Normocephalic and atraumatic.   Neck: Neck supple. No thyromegaly present.   Cardiovascular: Normal rate, regular rhythm and normal heart sounds.    Pulmonary/Chest: Effort normal. He has no wheezes.   Coarse BS bilaterally   Abdominal: Soft. Bowel sounds are normal. He exhibits no distension and no mass. There is no tenderness. No hernia.   Musculoskeletal: Normal range of motion.   Lymphadenopathy:     He has no cervical adenopathy.   Neurological: He is alert and oriented to person, place, and time.   Skin: Skin is warm and dry.   Psychiatric: He has a normal mood and affect. His behavior is normal. Judgment and thought content normal.   Vitals reviewed.       Assessment/Plan      Cody was seen today for diarrhea, nausea and vomiting.    Diagnoses and all orders for this visit:    Diarrhea, unspecified  type  -     TSH  -     Celiac Ab tTG DGP TIgA; Future  -     CBC & Differential  -     Case Request; Standing  -     sodium chloride 0.9 % flush 1-10 mL; Infuse 1-10 mL into a venous catheter As Needed for line care.  -     lactated ringers infusion; Infuse 30 mL/hr into a venous catheter Continuous.  -     Case Request    Other orders  -     esomeprazole (NexIUM) 40 MG packet; Take 40 mg by mouth Every Morning Before Breakfast.  -     sucralfate (CARAFATE) 1 GM/10ML suspension; Take 10 mL by mouth 4 (Four) Times a Day.  -     Implement Anesthesia orders day of procedure.; Standing  -     Obtain informed consent; Standing  -     Verify bowel prep was successful; Standing  -     Give tap water enema if bowel prep was insufficient; Standing  -     Insert Peripheral IV; Standing  -     Saline Lock & Maintain IV Access; Standing      Recommendations:  Stop metformin  Change Nexium to 40mg/day  Change carafate to liquid formulation  Add Enteragam 1 sachet bid    I would like to perform flexible sigmoidoscopy with L colon biopsies to rule out a microscopic/infectious colitis.  I have offered to do this this weekend but the patient would like to defer this procedure for a week or two.  I will repeat his CBC today, and may need to recheck again as we get closer to endoscopy as he does have rather significant neutropenia.

## 2017-02-06 ENCOUNTER — APPOINTMENT (OUTPATIENT)
Dept: ONCOLOGY | Facility: HOSPITAL | Age: 62
End: 2017-02-06

## 2017-02-06 ENCOUNTER — OFFICE VISIT (OUTPATIENT)
Dept: ONCOLOGY | Facility: CLINIC | Age: 62
End: 2017-02-06

## 2017-02-06 ENCOUNTER — INFUSION (OUTPATIENT)
Dept: ONCOLOGY | Facility: HOSPITAL | Age: 62
End: 2017-02-06

## 2017-02-06 VITALS
OXYGEN SATURATION: 94 % | SYSTOLIC BLOOD PRESSURE: 124 MMHG | HEART RATE: 86 BPM | WEIGHT: 222 LBS | HEIGHT: 71 IN | BODY MASS INDEX: 31.08 KG/M2 | DIASTOLIC BLOOD PRESSURE: 66 MMHG | RESPIRATION RATE: 16 BRPM | TEMPERATURE: 97.4 F

## 2017-02-06 DIAGNOSIS — K21.00 GASTROESOPHAGEAL REFLUX DISEASE WITH ESOPHAGITIS: ICD-10-CM

## 2017-02-06 DIAGNOSIS — C34.2 MALIGNANT NEOPLASM OF MIDDLE LOBE OF RIGHT LUNG (HCC): Primary | ICD-10-CM

## 2017-02-06 DIAGNOSIS — T45.1X5A CHEMOTHERAPY INDUCED NEUTROPENIA (HCC): ICD-10-CM

## 2017-02-06 DIAGNOSIS — Z45.2 FITTING AND ADJUSTMENT OF VASCULAR CATHETER: ICD-10-CM

## 2017-02-06 DIAGNOSIS — R19.7 DIARRHEA, UNSPECIFIED TYPE: ICD-10-CM

## 2017-02-06 DIAGNOSIS — D70.1 CHEMOTHERAPY INDUCED NEUTROPENIA (HCC): ICD-10-CM

## 2017-02-06 LAB
ANION GAP SERPL CALCULATED.3IONS-SCNC: 9.6 MMOL/L
BASOPHILS # BLD AUTO: 0.02 10*3/MM3 (ref 0–0.1)
BASOPHILS NFR BLD AUTO: 1.8 % (ref 0–1.1)
BUN BLD-MCNC: 13 MG/DL (ref 6–20)
BUN/CREAT SERPL: 20 (ref 7.3–30)
CALCIUM SPEC-SCNC: 8.8 MG/DL (ref 8.5–10.2)
CHLORIDE SERPL-SCNC: 92 MMOL/L (ref 98–107)
CO2 SERPL-SCNC: 34.4 MMOL/L (ref 22–29)
CREAT BLD-MCNC: 0.65 MG/DL (ref 0.7–1.3)
DEPRECATED RDW RBC AUTO: 59.8 FL (ref 37–49)
EOSINOPHIL # BLD AUTO: 0.02 10*3/MM3 (ref 0–0.36)
EOSINOPHIL NFR BLD AUTO: 1.8 % (ref 1–5)
ERYTHROCYTE [DISTWIDTH] IN BLOOD BY AUTOMATED COUNT: 19.9 % (ref 11.7–14.5)
GFR SERPL CREATININE-BSD FRML MDRD: 126 ML/MIN/1.73
GLUCOSE BLD-MCNC: 121 MG/DL (ref 74–124)
HCT VFR BLD AUTO: 38.8 % (ref 40–49)
HGB BLD-MCNC: 13.1 G/DL (ref 13.5–16.5)
HOLD SPECIMEN: NORMAL
IMM GRANULOCYTES # BLD: 0 10*3/MM3 (ref 0–0.03)
IMM GRANULOCYTES NFR BLD: 0 % (ref 0–0.5)
LYMPHOCYTES # BLD AUTO: 0.38 10*3/MM3 (ref 1–3.5)
LYMPHOCYTES NFR BLD AUTO: 34.9 % (ref 20–49)
MCH RBC QN AUTO: 29.4 PG (ref 27–33)
MCHC RBC AUTO-ENTMCNC: 33.8 G/DL (ref 32–35)
MCV RBC AUTO: 87 FL (ref 83–97)
MONOCYTES # BLD AUTO: 0.37 10*3/MM3 (ref 0.25–0.8)
MONOCYTES NFR BLD AUTO: 33.9 % (ref 4–12)
NEUTROPHILS # BLD AUTO: 0.3 10*3/MM3 (ref 1.5–7)
NEUTROPHILS NFR BLD AUTO: 27.6 % (ref 39–75)
NRBC BLD MANUAL-RTO: 0 /100 WBC (ref 0–0)
PLATELET # BLD AUTO: 75 10*3/MM3 (ref 150–375)
PMV BLD AUTO: 9.3 FL (ref 8.9–12.1)
POTASSIUM BLD-SCNC: 3.3 MMOL/L (ref 3.5–4.7)
RBC # BLD AUTO: 4.46 10*6/MM3 (ref 4.3–5.5)
SODIUM BLD-SCNC: 136 MMOL/L (ref 134–145)
WBC NRBC COR # BLD: 1.09 10*3/MM3 (ref 4–10)

## 2017-02-06 PROCEDURE — 25010000002 HEPARIN FLUSH (PORCINE) 100 UNIT/ML SOLUTION: Performed by: INTERNAL MEDICINE

## 2017-02-06 PROCEDURE — 96523 IRRIG DRUG DELIVERY DEVICE: CPT

## 2017-02-06 PROCEDURE — 36415 COLL VENOUS BLD VENIPUNCTURE: CPT

## 2017-02-06 PROCEDURE — 99214 OFFICE O/P EST MOD 30 MIN: CPT | Performed by: NURSE PRACTITIONER

## 2017-02-06 PROCEDURE — 80048 BASIC METABOLIC PNL TOTAL CA: CPT

## 2017-02-06 PROCEDURE — 85025 COMPLETE CBC W/AUTO DIFF WBC: CPT

## 2017-02-06 RX ORDER — SODIUM CHLORIDE 0.9 % (FLUSH) 0.9 %
10 SYRINGE (ML) INJECTION AS NEEDED
Status: CANCELLED | OUTPATIENT
Start: 2017-02-06

## 2017-02-06 RX ORDER — SODIUM CHLORIDE 0.9 % (FLUSH) 0.9 %
10 SYRINGE (ML) INJECTION AS NEEDED
Status: ACTIVE | OUTPATIENT
Start: 2017-02-06

## 2017-02-06 RX ADMIN — SODIUM CHLORIDE, PRESERVATIVE FREE 500 UNITS: 5 INJECTION INTRAVENOUS at 11:13

## 2017-02-06 RX ADMIN — Medication 10 ML: at 11:12
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
## 2017-02-08 ENCOUNTER — RESULTS ENCOUNTER (OUTPATIENT)
Dept: GASTROENTEROLOGY | Facility: CLINIC | Age: 62
End: 2017-02-08

## 2017-02-08 DIAGNOSIS — R19.7 DIARRHEA, UNSPECIFIED TYPE: ICD-10-CM

## 2017-02-13 ENCOUNTER — LAB (OUTPATIENT)
Dept: LAB | Facility: HOSPITAL | Age: 62
End: 2017-02-13

## 2017-02-13 ENCOUNTER — OFFICE VISIT (OUTPATIENT)
Dept: ONCOLOGY | Facility: CLINIC | Age: 62
End: 2017-02-13

## 2017-02-13 VITALS
HEART RATE: 74 BPM | TEMPERATURE: 97.7 F | DIASTOLIC BLOOD PRESSURE: 68 MMHG | RESPIRATION RATE: 16 BRPM | HEIGHT: 70 IN | SYSTOLIC BLOOD PRESSURE: 116 MMHG | BODY MASS INDEX: 32.35 KG/M2 | WEIGHT: 226 LBS | OXYGEN SATURATION: 92 %

## 2017-02-13 DIAGNOSIS — K21.00 GASTROESOPHAGEAL REFLUX DISEASE WITH ESOPHAGITIS: ICD-10-CM

## 2017-02-13 DIAGNOSIS — C34.2 MALIGNANT NEOPLASM OF MIDDLE LOBE OF RIGHT LUNG (HCC): Primary | ICD-10-CM

## 2017-02-13 DIAGNOSIS — R19.7 DIARRHEA, UNSPECIFIED TYPE: ICD-10-CM

## 2017-02-13 LAB
BASOPHILS # BLD AUTO: 0.03 10*3/MM3 (ref 0–0.1)
BASOPHILS NFR BLD AUTO: 0.7 % (ref 0–1.1)
DEPRECATED RDW RBC AUTO: 64.5 FL (ref 37–49)
EOSINOPHIL # BLD AUTO: 0.02 10*3/MM3 (ref 0–0.36)
EOSINOPHIL NFR BLD AUTO: 0.5 % (ref 1–5)
ERYTHROCYTE [DISTWIDTH] IN BLOOD BY AUTOMATED COUNT: 22.1 % (ref 11.7–14.5)
HCT VFR BLD AUTO: 39.7 % (ref 40–49)
HGB BLD-MCNC: 13.9 G/DL (ref 13.5–16.5)
IMM GRANULOCYTES # BLD: 0.12 10*3/MM3 (ref 0–0.03)
IMM GRANULOCYTES NFR BLD: 2.9 % (ref 0–0.5)
LYMPHOCYTES # BLD AUTO: 0.73 10*3/MM3 (ref 1–3.5)
LYMPHOCYTES NFR BLD AUTO: 17.3 % (ref 20–49)
MCH RBC QN AUTO: 30.7 PG (ref 27–33)
MCHC RBC AUTO-ENTMCNC: 35 G/DL (ref 32–35)
MCV RBC AUTO: 87.6 FL (ref 83–97)
MONOCYTES # BLD AUTO: 0.93 10*3/MM3 (ref 0.25–0.8)
MONOCYTES NFR BLD AUTO: 22.1 % (ref 4–12)
NEUTROPHILS # BLD AUTO: 2.38 10*3/MM3 (ref 1.5–7)
NEUTROPHILS NFR BLD AUTO: 56.5 % (ref 39–75)
NRBC BLD MANUAL-RTO: 1.2 /100 WBC (ref 0–0)
PLATELET # BLD AUTO: 118 10*3/MM3 (ref 150–375)
PMV BLD AUTO: 10.1 FL (ref 8.9–12.1)
RBC # BLD AUTO: 4.53 10*6/MM3 (ref 4.3–5.5)
WBC NRBC COR # BLD: 4.21 10*3/MM3 (ref 4–10)

## 2017-02-13 PROCEDURE — 99213 OFFICE O/P EST LOW 20 MIN: CPT | Performed by: NURSE PRACTITIONER

## 2017-02-13 PROCEDURE — 36415 COLL VENOUS BLD VENIPUNCTURE: CPT | Performed by: NURSE PRACTITIONER

## 2017-02-13 PROCEDURE — 85025 COMPLETE CBC W/AUTO DIFF WBC: CPT | Performed by: NURSE PRACTITIONER

## 2017-02-13 RX ORDER — ARMODAFINIL 250 MG/1
TABLET ORAL
COMMUNITY
Start: 2017-02-10 | End: 2017-04-07 | Stop reason: SDUPTHER
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
## 2017-02-14 RX ORDER — ESOMEPRAZOLE MAGNESIUM 40 MG/1
40 CAPSULE, DELAYED RELEASE ORAL
Qty: 90 CAPSULE | Refills: 3 | Status: SHIPPED | OUTPATIENT
Start: 2017-02-14 | End: 2017-09-20 | Stop reason: SDUPTHER

## 2017-02-14 NOTE — TELEPHONE ENCOUNTER
Clarification received from [facility] pharmacy on pt's Nexium. Script was written for packets but directions and dispense quantity written for capsules. Medication e-scribed.

## 2017-02-21 ENCOUNTER — TELEPHONE (OUTPATIENT)
Dept: GASTROENTEROLOGY | Facility: CLINIC | Age: 62
End: 2017-02-21

## 2017-02-21 RX ORDER — SUCRALFATE 1 G/1
1 TABLET ORAL 4 TIMES DAILY
Qty: 120 TABLET | Refills: 2 | Status: SHIPPED | OUTPATIENT
Start: 2017-02-21 | End: 2017-12-03

## 2017-02-21 NOTE — TELEPHONE ENCOUNTER
Per Juanita Anaya for carafate lyndon 1GM/10ML DENIED. Ordered Carafate 1 Gram tablet by mouth four times a day. Instructed to dissolve pill in 30-45cc of water and drink.

## 2017-02-27 ENCOUNTER — HOSPITAL ENCOUNTER (OUTPATIENT)
Dept: PET IMAGING | Facility: HOSPITAL | Age: 62
Discharge: HOME | End: 2017-02-27
Attending: INTERNAL MEDICINE | Admitting: INTERNAL MEDICINE

## 2017-02-27 ENCOUNTER — OFFICE VISIT (OUTPATIENT)
Dept: RADIATION ONCOLOGY | Facility: HOSPITAL | Age: 62
End: 2017-02-27

## 2017-02-27 ENCOUNTER — HOSPITAL ENCOUNTER (OUTPATIENT)
Dept: PET IMAGING | Facility: HOSPITAL | Age: 62
Discharge: HOME | End: 2017-02-27
Attending: INTERNAL MEDICINE

## 2017-02-27 ENCOUNTER — DOCUMENTATION (OUTPATIENT)
Dept: RADIATION ONCOLOGY | Facility: HOSPITAL | Age: 62
End: 2017-02-27

## 2017-02-27 ENCOUNTER — TELEPHONE (OUTPATIENT)
Dept: ONCOLOGY | Facility: HOSPITAL | Age: 62
End: 2017-02-27

## 2017-02-27 VITALS
TEMPERATURE: 96.8 F | SYSTOLIC BLOOD PRESSURE: 100 MMHG | DIASTOLIC BLOOD PRESSURE: 59 MMHG | WEIGHT: 214 LBS | HEART RATE: 91 BPM | BODY MASS INDEX: 31.15 KG/M2 | RESPIRATION RATE: 16 BRPM | OXYGEN SATURATION: 93 %

## 2017-02-27 DIAGNOSIS — C34.2 MALIGNANT NEOPLASM OF MIDDLE LOBE OF RIGHT LUNG (HCC): ICD-10-CM

## 2017-02-27 DIAGNOSIS — C34.2 MALIGNANT NEOPLASM OF MIDDLE LOBE OF RIGHT LUNG (HCC): Primary | ICD-10-CM

## 2017-02-27 PROCEDURE — A9552 F18 FDG: HCPCS | Performed by: INTERNAL MEDICINE

## 2017-02-27 PROCEDURE — 78815 PET IMAGE W/CT SKULL-THIGH: CPT

## 2017-02-27 PROCEDURE — 0 FLUDEOXYGLUCOSE F18 SOLUTION: Performed by: INTERNAL MEDICINE

## 2017-02-27 PROCEDURE — 99024 POSTOP FOLLOW-UP VISIT: CPT | Performed by: RADIOLOGY

## 2017-02-27 RX ORDER — DIPHENOXYLATE HYDROCHLORIDE AND ATROPINE SULFATE 2.5; .025 MG/1; MG/1
TABLET ORAL
Qty: 60 TABLET | Refills: 0 | Status: SHIPPED | OUTPATIENT
Start: 2017-02-27 | End: 2017-03-03 | Stop reason: SDUPTHER

## 2017-02-27 RX ADMIN — FLUDEOXYGLUCOSE F18 1 DOSE: 300 INJECTION INTRAVENOUS at 08:17

## 2017-02-27 NOTE — TELEPHONE ENCOUNTER
----- Message from Eva Gusman sent at 2/27/2017 12:41 PM EST -----  Contact: wife  Pt wife is calling to see if her  can have his teeth cleaned.            [phone]

## 2017-02-27 NOTE — TELEPHONE ENCOUNTER
Patient wants to have 3 teeth extracted. Reviewed with Joanna HOFFMAN, she would like patient to wait and discuss with Dr Alvarenga on Friday to make sure it is the right decision. Patient is ok with this and v/u

## 2017-02-27 NOTE — PROGRESS NOTES
2/27/2017     abby Zapata M.D.   [address] [facility], [address] [place] [address]   [address]   PHONE  [phone]   FAX  [phone]    Patient Name:  Cody Simental  YOB: 1955   Medical Record No.:  [id number]  Diagnosis:  T2N2M0 small cell carcinoma of right lung    Dear ALANNA Alvarenga    I am writing to let you know Cody Simental has recently completed the course of radiation therapy prescribed for the above-mentioned diagnosis.  Please see below the specifics of the course of treatment delivered:    Dates of treatment:  11/29/2016 - 1/24/2017.    Details of radiation therapy delivered:    We treated the right lung mass and involved PET positive nodes to a dose of 5940cGy in 33 fractions at 180cGy/fx.  He was treated with IMRT using 6 MV photons to protect the surrounding normal structures.  He received concurrent chemo with carboplatin and -16.      Tolerance and toxicities: He  tolerated the treatments fairly well with no major breaks.  He completed the above treatments in  56 elapsed days.  He reported fatigue and esophagitis at the completion.  He was given an rx for Carafate for the esophagitis.  He continued to smoke throughout the course of treatment and was encouraged and advised several times to try to quit.          F/U plans:  I have asked  to return to see me in 4 weeks. I will write again at that time and thanks again for allowing us to participate in your patient’s care.      Sincerely,    Sowmya Tavera M.D.      Electronically Approved By:  Sowmya Tavera M.D. 2/27/2017 10:32:40 AM     cc:  abby Zapata M.D.   [address] [facility], [address]   [place]   [address]   PHONE  [phone]   FAX  [phone]
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
## 2017-03-02 ENCOUNTER — APPOINTMENT (OUTPATIENT)
Dept: CT IMAGING | Facility: HOSPITAL | Age: 62
End: 2017-03-02
Attending: RADIOLOGY

## 2017-03-02 RX ORDER — DIPHENOXYLATE HYDROCHLORIDE AND ATROPINE SULFATE 2.5; .025 MG/1; MG/1
TABLET ORAL
Qty: 60 TABLET | Refills: 0 | OUTPATIENT
Start: 2017-03-02

## 2017-03-03 ENCOUNTER — OFFICE VISIT (OUTPATIENT)
Dept: ONCOLOGY | Facility: CLINIC | Age: 62
End: 2017-03-03

## 2017-03-03 ENCOUNTER — APPOINTMENT (OUTPATIENT)
Dept: ONCOLOGY | Facility: HOSPITAL | Age: 62
End: 2017-03-03

## 2017-03-03 VITALS
SYSTOLIC BLOOD PRESSURE: 124 MMHG | TEMPERATURE: 97.6 F | HEART RATE: 85 BPM | HEIGHT: 70 IN | WEIGHT: 210.8 LBS | BODY MASS INDEX: 30.18 KG/M2 | DIASTOLIC BLOOD PRESSURE: 72 MMHG | OXYGEN SATURATION: 93 % | RESPIRATION RATE: 16 BRPM

## 2017-03-03 DIAGNOSIS — C34.2 MALIGNANT NEOPLASM OF MIDDLE LOBE OF RIGHT LUNG (HCC): Primary | ICD-10-CM

## 2017-03-03 LAB
ALBUMIN SERPL-MCNC: 4 G/DL (ref 3.5–5.2)
ALBUMIN/GLOB SERPL: 1.1 G/DL
ALP SERPL-CCNC: 115 U/L (ref 39–117)
ALT SERPL W P-5'-P-CCNC: 10 U/L (ref 1–41)
ANION GAP SERPL CALCULATED.3IONS-SCNC: 10.7 MMOL/L
AST SERPL-CCNC: 12 U/L (ref 1–40)
BASOPHILS # BLD AUTO: 0.03 10*3/MM3 (ref 0–0.2)
BASOPHILS NFR BLD AUTO: 0.5 % (ref 0–1.5)
BILIRUB SERPL-MCNC: 0.4 MG/DL (ref 0.1–1.2)
BUN BLD-MCNC: 10 MG/DL (ref 6–20)
BUN/CREAT SERPL: 10.5 (ref 7–25)
CALCIUM SPEC-SCNC: 9.5 MG/DL (ref 8.6–10.5)
CHLORIDE SERPL-SCNC: 91 MMOL/L (ref 98–107)
CO2 SERPL-SCNC: 34.3 MMOL/L (ref 22–29)
CREAT BLD-MCNC: 0.95 MG/DL (ref 0.76–1.27)
DEPRECATED RDW RBC AUTO: 78.4 FL (ref 37–54)
EOSINOPHIL # BLD AUTO: 0.17 10*3/MM3 (ref 0–0.7)
EOSINOPHIL NFR BLD AUTO: 2.8 % (ref 0.3–6.2)
ERYTHROCYTE [DISTWIDTH] IN BLOOD BY AUTOMATED COUNT: 22.1 % (ref 11.5–14.5)
GFR SERPL CREATININE-BSD FRML MDRD: 81 ML/MIN/1.73
GLOBULIN UR ELPH-MCNC: 3.8 GM/DL
GLUCOSE BLD-MCNC: 114 MG/DL (ref 65–99)
HCT VFR BLD AUTO: 47.2 % (ref 40.4–52.2)
HGB BLD-MCNC: 15.5 G/DL (ref 13.7–17.6)
IMM GRANULOCYTES # BLD: 0.02 10*3/MM3 (ref 0–0.03)
IMM GRANULOCYTES NFR BLD: 0.3 % (ref 0–0.5)
LYMPHOCYTES # BLD AUTO: 1.43 10*3/MM3 (ref 0.9–4.8)
LYMPHOCYTES NFR BLD AUTO: 23.2 % (ref 19.6–45.3)
MCH RBC QN AUTO: 31.6 PG (ref 27–32.7)
MCHC RBC AUTO-ENTMCNC: 32.8 G/DL (ref 32.6–36.4)
MCV RBC AUTO: 96.1 FL (ref 79.8–96.2)
MONOCYTES # BLD AUTO: 0.71 10*3/MM3 (ref 0.2–1.2)
MONOCYTES NFR BLD AUTO: 11.5 % (ref 5–12)
NEUTROPHILS # BLD AUTO: 3.8 10*3/MM3 (ref 1.9–8.1)
NEUTROPHILS NFR BLD AUTO: 61.7 % (ref 42.7–76)
PLATELET # BLD AUTO: 242 10*3/MM3 (ref 140–500)
PMV BLD AUTO: 9.3 FL (ref 6–12)
POTASSIUM BLD-SCNC: 3.6 MMOL/L (ref 3.5–5.2)
PROT SERPL-MCNC: 7.8 G/DL (ref 6–8.5)
RBC # BLD AUTO: 4.91 10*6/MM3 (ref 4.6–6)
SODIUM BLD-SCNC: 136 MMOL/L (ref 136–145)
WBC NRBC COR # BLD: 6.16 10*3/MM3 (ref 4.5–10.7)

## 2017-03-03 PROCEDURE — 85025 COMPLETE CBC W/AUTO DIFF WBC: CPT | Performed by: INTERNAL MEDICINE

## 2017-03-03 PROCEDURE — 99215 OFFICE O/P EST HI 40 MIN: CPT | Performed by: INTERNAL MEDICINE

## 2017-03-03 PROCEDURE — 36415 COLL VENOUS BLD VENIPUNCTURE: CPT | Performed by: INTERNAL MEDICINE

## 2017-03-03 PROCEDURE — 80053 COMPREHEN METABOLIC PANEL: CPT | Performed by: INTERNAL MEDICINE
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
## 2017-03-06 ENCOUNTER — APPOINTMENT (OUTPATIENT)
Dept: ONCOLOGY | Facility: CLINIC | Age: 62
End: 2017-03-06

## 2017-03-06 ENCOUNTER — APPOINTMENT (OUTPATIENT)
Dept: LAB | Facility: HOSPITAL | Age: 62
End: 2017-03-06

## 2017-03-06 ENCOUNTER — APPOINTMENT (OUTPATIENT)
Dept: MRI IMAGING | Facility: HOSPITAL | Age: 62
End: 2017-03-06
Attending: RADIOLOGY

## 2017-03-06 RX ORDER — DIPHENOXYLATE HYDROCHLORIDE AND ATROPINE SULFATE 2.5; .025 MG/1; MG/1
TABLET ORAL
Qty: 60 TABLET | Refills: 1 | OUTPATIENT
Start: 2017-03-06 | End: 2017-12-08 | Stop reason: HOSPADM

## 2017-03-06 NOTE — TELEPHONE ENCOUNTER
Called and spoke with pharmacy. Script last filled on jan 17th. New script called in #[phone] with 1 refill.

## 2017-03-08 ENCOUNTER — OFFICE VISIT (OUTPATIENT)
Dept: INTERNAL MEDICINE | Facility: CLINIC | Age: 62
End: 2017-03-08

## 2017-03-08 VITALS
HEART RATE: 90 BPM | SYSTOLIC BLOOD PRESSURE: 118 MMHG | BODY MASS INDEX: 31.44 KG/M2 | DIASTOLIC BLOOD PRESSURE: 66 MMHG | WEIGHT: 216 LBS | TEMPERATURE: 97 F | OXYGEN SATURATION: 97 %

## 2017-03-08 DIAGNOSIS — Z79.4 TYPE 2 DIABETES MELLITUS WITH HYPERGLYCEMIA, WITH LONG-TERM CURRENT USE OF INSULIN (HCC): ICD-10-CM

## 2017-03-08 DIAGNOSIS — I10 ESSENTIAL HYPERTENSION: Primary | ICD-10-CM

## 2017-03-08 DIAGNOSIS — E11.65 TYPE 2 DIABETES MELLITUS WITH HYPERGLYCEMIA, WITH LONG-TERM CURRENT USE OF INSULIN (HCC): ICD-10-CM

## 2017-03-08 DIAGNOSIS — E78.2 MIXED HYPERLIPIDEMIA: ICD-10-CM

## 2017-03-08 DIAGNOSIS — C34.2 MALIGNANT NEOPLASM OF MIDDLE LOBE OF RIGHT LUNG (HCC): ICD-10-CM

## 2017-03-08 PROCEDURE — 99214 OFFICE O/P EST MOD 30 MIN: CPT | Performed by: FAMILY MEDICINE

## 2017-03-08 RX ORDER — DRONABINOL 5 MG/1
5 CAPSULE ORAL
Qty: 60 CAPSULE | Refills: 2 | Status: SHIPPED | OUTPATIENT
Start: 2017-03-08 | End: 2017-06-08 | Stop reason: SDUPTHER

## 2017-03-08 NOTE — PROGRESS NOTES
Subjective   Cody Simental is a 61 y.o. male.     Chief Complaint   Patient presents with   • GI Problem   • Hypertension   • Hyperlipidemia   • Diabetes         History of Present Illness patient is here with his wife with history of follow-up problematic in that he has small cell lung cancer with minimal effective treatments apparently.  Oncology's notes are reviewed.  Reviewed treatment of his other conditions: Hypertension hyperlipidemia diabetes type 2 insulin requiring.  We need current medications for all these things.  We discussed his weight loss and appetite.  We discussed pros and cons of using Marinol for appetite enhancement.  He is agreeable to try this medication.    The following portions of the patient's history were reviewed and updated as appropriate: allergies, current medications, past social history and problem list.    Review of Systems   Constitutional: Positive for fatigue and unexpected weight change.   HENT: Negative.    Eyes: Negative.    Respiratory: Positive for cough and shortness of breath.    Cardiovascular: Negative.    Gastrointestinal: Positive for abdominal pain.   Endocrine: Negative.    Genitourinary: Negative.    Musculoskeletal: Negative.    Skin: Negative.    Allergic/Immunologic: Negative.    Neurological: Negative.    Hematological: Negative.    Psychiatric/Behavioral: Negative.        Objective   Vitals:    03/08/17 1047   BP: 118/66   Pulse: 90   Temp: 97 °F (36.1 °C)   SpO2: 97%     Physical Exam   Constitutional: He is oriented to person, place, and time. He appears well-developed and well-nourished.   HENT:   Head: Normocephalic and atraumatic.   Right Ear: Tympanic membrane and external ear normal.   Left Ear: Tympanic membrane and external ear normal.   Nose: Nose normal.   Mouth/Throat: Oropharynx is clear and moist.   Eyes: Conjunctivae and EOM are normal. Pupils are equal, round, and reactive to light.   Neck: Normal range of motion. Neck supple. No JVD  present. No thyromegaly present.   Cardiovascular: Normal rate, regular rhythm, normal heart sounds and intact distal pulses.    Pulmonary/Chest: Effort normal. He has rhonchi in the right middle field, the right lower field, the left middle field and the left lower field.   Abdominal: Soft. Bowel sounds are normal.   Musculoskeletal: Normal range of motion.   Lymphadenopathy:     He has no cervical adenopathy.   Neurological: He is alert and oriented to person, place, and time. No cranial nerve deficit. Coordination normal.   Skin: Skin is warm and dry. No rash noted.   Psychiatric: He has a normal mood and affect. His behavior is normal. Judgment and thought content normal.   Vitals reviewed.      Assessment/Plan   Problem List Items Addressed This Visit        Cardiovascular and Mediastinum    Essential hypertension - Primary    Hyperlipidemia       Respiratory    Malignant neoplasm of middle lobe of right lung       Endocrine    Type 2 diabetes mellitus with hyperglycemia, with long-term current use of insulin       plan: Medications stay the same counseled on treatment of appetite during treatment of cancer.  Marinol 5 mg twice a day if affordable.  We'll see him back in about 3 months sooner if needed.  Follow-up with oncology.

## 2017-03-16 ENCOUNTER — HOSPITAL ENCOUNTER (OUTPATIENT)
Dept: MRI IMAGING | Facility: HOSPITAL | Age: 62
Discharge: HOME | End: 2017-03-16
Attending: RADIOLOGY

## 2017-03-16 DIAGNOSIS — C34.2 MALIGNANT NEOPLASM OF MIDDLE LOBE OF RIGHT LUNG (HCC): ICD-10-CM

## 2017-03-16 PROCEDURE — 82565 ASSAY OF CREATININE: CPT

## 2017-03-17 LAB — CREAT BLDA-MCNC: 0.8 MG/DL (ref 0.6–1.3)

## 2017-03-18 ENCOUNTER — APPOINTMENT (OUTPATIENT)
Dept: GENERAL RADIOLOGY | Facility: HOSPITAL | Age: 62
End: 2017-03-18

## 2017-03-18 ENCOUNTER — APPOINTMENT (OUTPATIENT)
Dept: CT IMAGING | Facility: HOSPITAL | Age: 62
End: 2017-03-18

## 2017-03-18 ENCOUNTER — HOSPITAL ENCOUNTER (INPATIENT)
Facility: HOSPITAL | Age: 62
LOS: 2 days | Discharge: HOME | End: 2017-03-20
Attending: EMERGENCY MEDICINE | Admitting: THORACIC SURGERY (CARDIOTHORACIC VASCULAR SURGERY)

## 2017-03-18 DIAGNOSIS — J93.9 PNEUMOTHORAX, RIGHT: Primary | ICD-10-CM

## 2017-03-18 LAB
ALBUMIN SERPL-MCNC: 4.2 G/DL (ref 3.5–5.2)
ALBUMIN/GLOB SERPL: 1 G/DL
ALP SERPL-CCNC: 111 U/L (ref 39–117)
ALT SERPL W P-5'-P-CCNC: 12 U/L (ref 1–41)
ANION GAP SERPL CALCULATED.3IONS-SCNC: 12.6 MMOL/L
AST SERPL-CCNC: 16 U/L (ref 1–40)
BASOPHILS # BLD AUTO: 0.03 10*3/MM3 (ref 0–0.2)
BASOPHILS NFR BLD AUTO: 0.3 % (ref 0–1.5)
BILIRUB SERPL-MCNC: 0.4 MG/DL (ref 0.1–1.2)
BUN BLD-MCNC: 11 MG/DL (ref 6–20)
BUN/CREAT SERPL: 14.7 (ref 7–25)
CALCIUM SPEC-SCNC: 9.8 MG/DL (ref 8.6–10.5)
CHLORIDE SERPL-SCNC: 93 MMOL/L (ref 98–107)
CO2 SERPL-SCNC: 32.4 MMOL/L (ref 22–29)
CREAT BLD-MCNC: 0.75 MG/DL (ref 0.76–1.27)
DEPRECATED RDW RBC AUTO: 66.5 FL (ref 37–54)
EOSINOPHIL # BLD AUTO: 0.01 10*3/MM3 (ref 0–0.7)
EOSINOPHIL NFR BLD AUTO: 0.1 % (ref 0.3–6.2)
ERYTHROCYTE [DISTWIDTH] IN BLOOD BY AUTOMATED COUNT: 18.7 % (ref 11.5–14.5)
GFR SERPL CREATININE-BSD FRML MDRD: 107 ML/MIN/1.73
GLOBULIN UR ELPH-MCNC: 4.1 GM/DL
GLUCOSE BLD-MCNC: 114 MG/DL (ref 65–99)
HCT VFR BLD AUTO: 47.5 % (ref 40.4–52.2)
HGB BLD-MCNC: 16 G/DL (ref 13.7–17.6)
IMM GRANULOCYTES # BLD: 0 10*3/MM3 (ref 0–0.03)
IMM GRANULOCYTES NFR BLD: 0 % (ref 0–0.5)
LYMPHOCYTES # BLD AUTO: 0.89 10*3/MM3 (ref 0.9–4.8)
LYMPHOCYTES NFR BLD AUTO: 9.6 % (ref 19.6–45.3)
MCH RBC QN AUTO: 32.3 PG (ref 27–32.7)
MCHC RBC AUTO-ENTMCNC: 33.7 G/DL (ref 32.6–36.4)
MCV RBC AUTO: 96 FL (ref 79.8–96.2)
MONOCYTES # BLD AUTO: 0.75 10*3/MM3 (ref 0.2–1.2)
MONOCYTES NFR BLD AUTO: 8.1 % (ref 5–12)
NEUTROPHILS # BLD AUTO: 7.56 10*3/MM3 (ref 1.9–8.1)
NEUTROPHILS NFR BLD AUTO: 81.9 % (ref 42.7–76)
NRBC BLD MANUAL-RTO: 0 /100 WBC (ref 0–0)
NT-PROBNP SERPL-MCNC: 241.8 PG/ML (ref 5–900)
PLATELET # BLD AUTO: 209 10*3/MM3 (ref 140–500)
PMV BLD AUTO: 9.1 FL (ref 6–12)
POTASSIUM BLD-SCNC: 3.4 MMOL/L (ref 3.5–5.2)
PROT SERPL-MCNC: 8.3 G/DL (ref 6–8.5)
RBC # BLD AUTO: 4.95 10*6/MM3 (ref 4.6–6)
SODIUM BLD-SCNC: 138 MMOL/L (ref 136–145)
TROPONIN T SERPL-MCNC: <0.01 NG/ML (ref 0–0.03)
WBC NRBC COR # BLD: 9.24 10*3/MM3 (ref 4.5–10.7)

## 2017-03-18 PROCEDURE — 99285 EMERGENCY DEPT VISIT HI MDM: CPT

## 2017-03-18 PROCEDURE — 94644 CONT INHLJ TX 1ST HOUR: CPT

## 2017-03-18 PROCEDURE — 85025 COMPLETE CBC W/AUTO DIFF WBC: CPT | Performed by: EMERGENCY MEDICINE

## 2017-03-18 PROCEDURE — 0W9930Z DRAINAGE OF RIGHT PLEURAL CAVITY WITH DRAINAGE DEVICE, PERCUTANEOUS APPROACH: ICD-10-PCS | Performed by: EMERGENCY MEDICINE

## 2017-03-18 PROCEDURE — 0 IOPAMIDOL 61 % SOLUTION: Performed by: EMERGENCY MEDICINE

## 2017-03-18 PROCEDURE — 80053 COMPREHEN METABOLIC PANEL: CPT | Performed by: EMERGENCY MEDICINE

## 2017-03-18 PROCEDURE — 25010000002 HEPARIN (PORCINE) PER 1000 UNITS: Performed by: THORACIC SURGERY (CARDIOTHORACIC VASCULAR SURGERY)

## 2017-03-18 PROCEDURE — 84484 ASSAY OF TROPONIN QUANT: CPT | Performed by: EMERGENCY MEDICINE

## 2017-03-18 PROCEDURE — 25010000002 HYDROMORPHONE PER 4 MG

## 2017-03-18 PROCEDURE — 83880 ASSAY OF NATRIURETIC PEPTIDE: CPT | Performed by: EMERGENCY MEDICINE

## 2017-03-18 PROCEDURE — 93005 ELECTROCARDIOGRAM TRACING: CPT | Performed by: EMERGENCY MEDICINE

## 2017-03-18 PROCEDURE — 71010 HC CHEST PA OR AP: CPT

## 2017-03-18 PROCEDURE — 94799 UNLISTED PULMONARY SVC/PX: CPT

## 2017-03-18 PROCEDURE — 94640 AIRWAY INHALATION TREATMENT: CPT

## 2017-03-18 PROCEDURE — 71260 CT THORAX DX C+: CPT

## 2017-03-18 PROCEDURE — 25010000002 HYDROMORPHONE PER 4 MG: Performed by: EMERGENCY MEDICINE

## 2017-03-18 PROCEDURE — 25010000002 HYDROMORPHONE PER 4 MG: Performed by: THORACIC SURGERY (CARDIOTHORACIC VASCULAR SURGERY)

## 2017-03-18 PROCEDURE — 32551 INSERTION OF CHEST TUBE: CPT

## 2017-03-18 RX ORDER — PROCHLORPERAZINE MALEATE 10 MG
10 TABLET ORAL EVERY 6 HOURS PRN
Status: DISCONTINUED | OUTPATIENT
Start: 2017-03-18 | End: 2017-03-20 | Stop reason: HOSPADM

## 2017-03-18 RX ORDER — NALOXONE HCL 0.4 MG/ML
0.4 VIAL (ML) INJECTION
Status: DISCONTINUED | OUTPATIENT
Start: 2017-03-18 | End: 2017-03-20 | Stop reason: HOSPADM

## 2017-03-18 RX ORDER — POTASSIUM CHLORIDE 1.5 G/1.77G
20 POWDER, FOR SOLUTION ORAL DAILY
Status: DISCONTINUED | OUTPATIENT
Start: 2017-03-19 | End: 2017-03-18 | Stop reason: CLARIF

## 2017-03-18 RX ORDER — ALPRAZOLAM 0.5 MG/1
0.5 TABLET ORAL 2 TIMES DAILY PRN
Status: DISCONTINUED | OUTPATIENT
Start: 2017-03-18 | End: 2017-03-20 | Stop reason: HOSPADM

## 2017-03-18 RX ORDER — CITALOPRAM 20 MG/1
20 TABLET ORAL DAILY
Status: DISCONTINUED | OUTPATIENT
Start: 2017-03-19 | End: 2017-03-20 | Stop reason: HOSPADM

## 2017-03-18 RX ORDER — SODIUM CHLORIDE 0.9 % (FLUSH) 0.9 %
1-10 SYRINGE (ML) INJECTION AS NEEDED
Status: DISCONTINUED | OUTPATIENT
Start: 2017-03-18 | End: 2017-03-20 | Stop reason: HOSPADM

## 2017-03-18 RX ORDER — ONDANSETRON 2 MG/ML
4 INJECTION INTRAMUSCULAR; INTRAVENOUS EVERY 6 HOURS PRN
Status: DISCONTINUED | OUTPATIENT
Start: 2017-03-18 | End: 2017-03-20 | Stop reason: HOSPADM

## 2017-03-18 RX ORDER — GABAPENTIN 400 MG/1
400 CAPSULE ORAL 4 TIMES DAILY
Status: DISCONTINUED | OUTPATIENT
Start: 2017-03-18 | End: 2017-03-20 | Stop reason: HOSPADM

## 2017-03-18 RX ORDER — GABAPENTIN 800 MG/1
800 TABLET ORAL 4 TIMES DAILY
Status: DISCONTINUED | OUTPATIENT
Start: 2017-03-18 | End: 2017-03-20 | Stop reason: HOSPADM

## 2017-03-18 RX ORDER — FUROSEMIDE 80 MG
160 TABLET ORAL 2 TIMES DAILY
Status: DISCONTINUED | OUTPATIENT
Start: 2017-03-18 | End: 2017-03-20 | Stop reason: HOSPADM

## 2017-03-18 RX ORDER — ACETAMINOPHEN 325 MG/1
650 TABLET ORAL EVERY 4 HOURS PRN
Status: DISCONTINUED | OUTPATIENT
Start: 2017-03-18 | End: 2017-03-20 | Stop reason: HOSPADM

## 2017-03-18 RX ORDER — SODIUM CHLORIDE 0.9 % (FLUSH) 0.9 %
10 SYRINGE (ML) INJECTION AS NEEDED
Status: DISCONTINUED | OUTPATIENT
Start: 2017-03-18 | End: 2017-03-20 | Stop reason: HOSPADM

## 2017-03-18 RX ORDER — PANTOPRAZOLE SODIUM 40 MG/1
40 TABLET, DELAYED RELEASE ORAL
Status: DISCONTINUED | OUTPATIENT
Start: 2017-03-19 | End: 2017-03-20 | Stop reason: HOSPADM

## 2017-03-18 RX ORDER — LOPERAMIDE HYDROCHLORIDE 2 MG/1
4 CAPSULE ORAL 2 TIMES DAILY
Status: DISCONTINUED | OUTPATIENT
Start: 2017-03-18 | End: 2017-03-20 | Stop reason: HOSPADM

## 2017-03-18 RX ORDER — ALBUTEROL SULFATE 2.5 MG/3ML
10 SOLUTION RESPIRATORY (INHALATION) CONTINUOUS
Status: DISPENSED | OUTPATIENT
Start: 2017-03-18 | End: 2017-03-18

## 2017-03-18 RX ORDER — CARVEDILOL 6.25 MG/1
6.25 TABLET ORAL 2 TIMES DAILY WITH MEALS
Status: DISCONTINUED | OUTPATIENT
Start: 2017-03-18 | End: 2017-03-20 | Stop reason: HOSPADM

## 2017-03-18 RX ORDER — ONDANSETRON HYDROCHLORIDE 8 MG/1
8 TABLET, FILM COATED ORAL EVERY 8 HOURS PRN
Status: DISCONTINUED | OUTPATIENT
Start: 2017-03-18 | End: 2017-03-20 | Stop reason: HOSPADM

## 2017-03-18 RX ORDER — TAMSULOSIN HYDROCHLORIDE 0.4 MG/1
0.4 CAPSULE ORAL 2 TIMES DAILY
Status: DISCONTINUED | OUTPATIENT
Start: 2017-03-18 | End: 2017-03-20 | Stop reason: HOSPADM

## 2017-03-18 RX ORDER — LOSARTAN POTASSIUM 25 MG/1
25 TABLET ORAL 2 TIMES DAILY
Status: DISCONTINUED | OUTPATIENT
Start: 2017-03-18 | End: 2017-03-20 | Stop reason: HOSPADM

## 2017-03-18 RX ORDER — ASPIRIN 81 MG/1
81 TABLET ORAL DAILY
Status: DISCONTINUED | OUTPATIENT
Start: 2017-03-19 | End: 2017-03-20 | Stop reason: HOSPADM

## 2017-03-18 RX ORDER — SUCRALFATE 1 G/1
1 TABLET ORAL 4 TIMES DAILY
Status: DISCONTINUED | OUTPATIENT
Start: 2017-03-18 | End: 2017-03-20 | Stop reason: HOSPADM

## 2017-03-18 RX ORDER — NICOTINE 21 MG/24HR
1 PATCH, TRANSDERMAL 24 HOURS TRANSDERMAL EVERY 24 HOURS
Status: DISCONTINUED | OUTPATIENT
Start: 2017-03-18 | End: 2017-03-20 | Stop reason: HOSPADM

## 2017-03-18 RX ORDER — DIAZEPAM 5 MG/1
5 TABLET ORAL ONCE
Status: COMPLETED | OUTPATIENT
Start: 2017-03-18 | End: 2017-03-18

## 2017-03-18 RX ORDER — HYDROCODONE BITARTRATE AND ACETAMINOPHEN 7.5; 325 MG/1; MG/1
1 TABLET ORAL EVERY 4 HOURS PRN
Status: DISCONTINUED | OUTPATIENT
Start: 2017-03-18 | End: 2017-03-20 | Stop reason: HOSPADM

## 2017-03-18 RX ORDER — NITROGLYCERIN 0.4 MG/1
0.4 TABLET SUBLINGUAL
Status: DISCONTINUED | OUTPATIENT
Start: 2017-03-18 | End: 2017-03-20 | Stop reason: HOSPADM

## 2017-03-18 RX ORDER — DRONABINOL 2.5 MG/1
5 CAPSULE ORAL
Status: DISCONTINUED | OUTPATIENT
Start: 2017-03-19 | End: 2017-03-20 | Stop reason: HOSPADM

## 2017-03-18 RX ORDER — IPRATROPIUM BROMIDE AND ALBUTEROL SULFATE 2.5; .5 MG/3ML; MG/3ML
3 SOLUTION RESPIRATORY (INHALATION)
Status: DISCONTINUED | OUTPATIENT
Start: 2017-03-18 | End: 2017-03-20 | Stop reason: HOSPADM

## 2017-03-18 RX ORDER — TESTOSTERONE CYPIONATE 200 MG/ML
200 INJECTION, SOLUTION INTRAMUSCULAR
Status: DISCONTINUED | OUTPATIENT
Start: 2017-03-20 | End: 2017-03-20 | Stop reason: HOSPADM

## 2017-03-18 RX ORDER — POTASSIUM CHLORIDE 750 MG/1
20 CAPSULE, EXTENDED RELEASE ORAL DAILY
Status: DISCONTINUED | OUTPATIENT
Start: 2017-03-19 | End: 2017-03-20 | Stop reason: HOSPADM

## 2017-03-18 RX ORDER — ONDANSETRON 4 MG/1
4 TABLET, ORALLY DISINTEGRATING ORAL EVERY 6 HOURS PRN
Status: DISCONTINUED | OUTPATIENT
Start: 2017-03-18 | End: 2017-03-20 | Stop reason: HOSPADM

## 2017-03-18 RX ORDER — HEPARIN SODIUM 5000 [USP'U]/ML
5000 INJECTION, SOLUTION INTRAVENOUS; SUBCUTANEOUS EVERY 8 HOURS SCHEDULED
Status: DISCONTINUED | OUTPATIENT
Start: 2017-03-18 | End: 2017-03-20 | Stop reason: HOSPADM

## 2017-03-18 RX ORDER — ONDANSETRON 4 MG/1
4 TABLET, FILM COATED ORAL EVERY 6 HOURS PRN
Status: DISCONTINUED | OUTPATIENT
Start: 2017-03-18 | End: 2017-03-20 | Stop reason: HOSPADM

## 2017-03-18 RX ORDER — BUDESONIDE AND FORMOTEROL FUMARATE DIHYDRATE 160; 4.5 UG/1; UG/1
2 AEROSOL RESPIRATORY (INHALATION)
Status: DISCONTINUED | OUTPATIENT
Start: 2017-03-18 | End: 2017-03-20 | Stop reason: HOSPADM

## 2017-03-18 RX ORDER — DIPHENOXYLATE HYDROCHLORIDE AND ATROPINE SULFATE 2.5; .025 MG/1; MG/1
1 TABLET ORAL 4 TIMES DAILY
Status: DISCONTINUED | OUTPATIENT
Start: 2017-03-18 | End: 2017-03-20 | Stop reason: HOSPADM

## 2017-03-18 RX ADMIN — HYDROMORPHONE HYDROCHLORIDE 0.5 MG: 1 INJECTION, SOLUTION INTRAMUSCULAR; INTRAVENOUS; SUBCUTANEOUS at 23:03

## 2017-03-18 RX ADMIN — HYDROMORPHONE HYDROCHLORIDE 2 MG: 1 INJECTION, SOLUTION INTRAMUSCULAR; INTRAVENOUS; SUBCUTANEOUS at 18:07

## 2017-03-18 RX ADMIN — CARVEDILOL 6.25 MG: 6.25 TABLET, FILM COATED ORAL at 23:06

## 2017-03-18 RX ADMIN — Medication 2 MG: at 18:07

## 2017-03-18 RX ADMIN — IOPAMIDOL 75 ML: 612 INJECTION, SOLUTION INTRAVENOUS at 16:18

## 2017-03-18 RX ADMIN — ALPRAZOLAM 0.5 MG: 0.5 TABLET ORAL at 23:12

## 2017-03-18 RX ADMIN — DIAZEPAM 5 MG: 5 TABLET ORAL at 19:06

## 2017-03-18 RX ADMIN — HYDROMORPHONE HYDROCHLORIDE 1 MG: 1 INJECTION, SOLUTION INTRAMUSCULAR; INTRAVENOUS; SUBCUTANEOUS at 18:44

## 2017-03-18 RX ADMIN — GABAPENTIN 800 MG: 800 TABLET ORAL at 23:05

## 2017-03-18 RX ADMIN — LOSARTAN POTASSIUM 25 MG: 25 TABLET, FILM COATED ORAL at 23:05

## 2017-03-18 RX ADMIN — NICOTINE 1 PATCH: 21 PATCH, EXTENDED RELEASE TRANSDERMAL at 23:06

## 2017-03-18 RX ADMIN — HYDROCODONE BITARTRATE AND ACETAMINOPHEN 1 TABLET: 7.5; 325 TABLET ORAL at 21:23

## 2017-03-18 RX ADMIN — GABAPENTIN 400 MG: 400 CAPSULE ORAL at 23:06

## 2017-03-18 RX ADMIN — NYSTATIN 500000 UNITS: 100000 SUSPENSION ORAL at 23:05

## 2017-03-18 RX ADMIN — FUROSEMIDE 160 MG: 80 TABLET ORAL at 23:06

## 2017-03-18 RX ADMIN — TAMSULOSIN HYDROCHLORIDE 0.4 MG: 0.4 CAPSULE ORAL at 23:05

## 2017-03-18 RX ADMIN — SUCRALFATE 1 G: 1 TABLET ORAL at 23:05

## 2017-03-18 RX ADMIN — ALBUTEROL SULFATE 10 MG: 2.5 SOLUTION RESPIRATORY (INHALATION) at 15:05

## 2017-03-18 RX ADMIN — HEPARIN SODIUM 5000 UNITS: 5000 INJECTION, SOLUTION INTRAVENOUS; SUBCUTANEOUS at 23:02

## 2017-03-18 RX ADMIN — HYDROMORPHONE HYDROCHLORIDE 1 MG: 1 INJECTION, SOLUTION INTRAMUSCULAR; INTRAVENOUS; SUBCUTANEOUS at 15:10
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
## 2017-03-18 NOTE — ED NOTES
PT CALLED EMS FOR SOA SINCE 6AM TODAY WITH COUGH. UPON IMMEDIATE ARRIVAL TO ER TRIAGE PT THEN REPORTS CHEST DISCOMFORT.      Jo Torres RN  03/18/17 [id number]

## 2017-03-19 ENCOUNTER — APPOINTMENT (OUTPATIENT)
Dept: GENERAL RADIOLOGY | Facility: HOSPITAL | Age: 62
End: 2017-03-19

## 2017-03-19 LAB
ANION GAP SERPL CALCULATED.3IONS-SCNC: 12.8 MMOL/L
BUN BLD-MCNC: 12 MG/DL (ref 6–20)
BUN/CREAT SERPL: 16 (ref 7–25)
CALCIUM SPEC-SCNC: 9.6 MG/DL (ref 8.6–10.5)
CHLORIDE SERPL-SCNC: 96 MMOL/L (ref 98–107)
CO2 SERPL-SCNC: 31.2 MMOL/L (ref 22–29)
CREAT BLD-MCNC: 0.75 MG/DL (ref 0.76–1.27)
DEPRECATED RDW RBC AUTO: 70.2 FL (ref 37–54)
ERYTHROCYTE [DISTWIDTH] IN BLOOD BY AUTOMATED COUNT: 18.9 % (ref 11.5–14.5)
GFR SERPL CREATININE-BSD FRML MDRD: 107 ML/MIN/1.73
GLUCOSE BLD-MCNC: 97 MG/DL (ref 65–99)
HCT VFR BLD AUTO: 48.1 % (ref 40.4–52.2)
HGB BLD-MCNC: 15.6 G/DL (ref 13.7–17.6)
MCH RBC QN AUTO: 32.8 PG (ref 27–32.7)
MCHC RBC AUTO-ENTMCNC: 32.4 G/DL (ref 32.6–36.4)
MCV RBC AUTO: 101.3 FL (ref 79.8–96.2)
PLATELET # BLD AUTO: 169 10*3/MM3 (ref 140–500)
PMV BLD AUTO: 9.2 FL (ref 6–12)
POTASSIUM BLD-SCNC: 3.2 MMOL/L (ref 3.5–5.2)
RBC # BLD AUTO: 4.75 10*6/MM3 (ref 4.6–6)
SODIUM BLD-SCNC: 140 MMOL/L (ref 136–145)
WBC NRBC COR # BLD: 10.5 10*3/MM3 (ref 4.5–10.7)

## 2017-03-19 PROCEDURE — 25010000002 HEPARIN (PORCINE) PER 1000 UNITS: Performed by: THORACIC SURGERY (CARDIOTHORACIC VASCULAR SURGERY)

## 2017-03-19 PROCEDURE — 71010 HC CHEST PA OR AP: CPT

## 2017-03-19 PROCEDURE — 85027 COMPLETE CBC AUTOMATED: CPT | Performed by: THORACIC SURGERY (CARDIOTHORACIC VASCULAR SURGERY)

## 2017-03-19 PROCEDURE — 94799 UNLISTED PULMONARY SVC/PX: CPT

## 2017-03-19 PROCEDURE — 99221 1ST HOSP IP/OBS SF/LOW 40: CPT | Performed by: THORACIC SURGERY (CARDIOTHORACIC VASCULAR SURGERY)

## 2017-03-19 PROCEDURE — 80048 BASIC METABOLIC PNL TOTAL CA: CPT | Performed by: THORACIC SURGERY (CARDIOTHORACIC VASCULAR SURGERY)

## 2017-03-19 PROCEDURE — 94640 AIRWAY INHALATION TREATMENT: CPT

## 2017-03-19 PROCEDURE — 63710000001 DRONABINOL PER 2.5 MG: Performed by: THORACIC SURGERY (CARDIOTHORACIC VASCULAR SURGERY)

## 2017-03-19 PROCEDURE — 25010000002 HYDROMORPHONE PER 4 MG: Performed by: THORACIC SURGERY (CARDIOTHORACIC VASCULAR SURGERY)

## 2017-03-19 RX ORDER — CHOLESTYRAMINE 4 G/9G
1 POWDER, FOR SUSPENSION ORAL 2 TIMES DAILY
Status: DISCONTINUED | OUTPATIENT
Start: 2017-03-19 | End: 2017-03-20

## 2017-03-19 RX ORDER — ALBUTEROL SULFATE 90 UG/1
2 AEROSOL, METERED RESPIRATORY (INHALATION) EVERY 4 HOURS PRN
Status: DISCONTINUED | OUTPATIENT
Start: 2017-03-19 | End: 2017-03-20 | Stop reason: HOSPADM

## 2017-03-19 RX ADMIN — CITALOPRAM 20 MG: 20 TABLET, FILM COATED ORAL at 08:48

## 2017-03-19 RX ADMIN — GABAPENTIN 800 MG: 800 TABLET ORAL at 17:34

## 2017-03-19 RX ADMIN — IPRATROPIUM BROMIDE AND ALBUTEROL SULFATE 3 ML: .5; 3 SOLUTION RESPIRATORY (INHALATION) at 15:39

## 2017-03-19 RX ADMIN — SUCRALFATE 1 G: 1 TABLET ORAL at 17:34

## 2017-03-19 RX ADMIN — IPRATROPIUM BROMIDE AND ALBUTEROL SULFATE 3 ML: .5; 3 SOLUTION RESPIRATORY (INHALATION) at 20:54

## 2017-03-19 RX ADMIN — SUCRALFATE 1 G: 1 TABLET ORAL at 11:20

## 2017-03-19 RX ADMIN — TAMSULOSIN HYDROCHLORIDE 0.4 MG: 0.4 CAPSULE ORAL at 08:48

## 2017-03-19 RX ADMIN — HEPARIN SODIUM 5000 UNITS: 5000 INJECTION, SOLUTION INTRAVENOUS; SUBCUTANEOUS at 05:53

## 2017-03-19 RX ADMIN — HEPARIN SODIUM 5000 UNITS: 5000 INJECTION, SOLUTION INTRAVENOUS; SUBCUTANEOUS at 22:45

## 2017-03-19 RX ADMIN — IPRATROPIUM BROMIDE AND ALBUTEROL SULFATE 3 ML: .5; 3 SOLUTION RESPIRATORY (INHALATION) at 07:45

## 2017-03-19 RX ADMIN — HYDROMORPHONE HYDROCHLORIDE 0.5 MG: 1 INJECTION, SOLUTION INTRAMUSCULAR; INTRAVENOUS; SUBCUTANEOUS at 22:54

## 2017-03-19 RX ADMIN — GABAPENTIN 400 MG: 400 CAPSULE ORAL at 20:07

## 2017-03-19 RX ADMIN — BUDESONIDE AND FORMOTEROL FUMARATE DIHYDRATE 2 PUFF: 160; 4.5 AEROSOL RESPIRATORY (INHALATION) at 07:50

## 2017-03-19 RX ADMIN — HYDROMORPHONE HYDROCHLORIDE 0.5 MG: 1 INJECTION, SOLUTION INTRAMUSCULAR; INTRAVENOUS; SUBCUTANEOUS at 17:34

## 2017-03-19 RX ADMIN — HYDROCODONE BITARTRATE AND ACETAMINOPHEN 1 TABLET: 7.5; 325 TABLET ORAL at 11:20

## 2017-03-19 RX ADMIN — GABAPENTIN 800 MG: 800 TABLET ORAL at 20:09

## 2017-03-19 RX ADMIN — BUDESONIDE AND FORMOTEROL FUMARATE DIHYDRATE 2 PUFF: 160; 4.5 AEROSOL RESPIRATORY (INHALATION) at 20:55

## 2017-03-19 RX ADMIN — ASPIRIN 81 MG: 81 TABLET ORAL at 08:48

## 2017-03-19 RX ADMIN — HEPARIN SODIUM 5000 UNITS: 5000 INJECTION, SOLUTION INTRAVENOUS; SUBCUTANEOUS at 14:53

## 2017-03-19 RX ADMIN — CARVEDILOL 6.25 MG: 6.25 TABLET, FILM COATED ORAL at 08:48

## 2017-03-19 RX ADMIN — GABAPENTIN 400 MG: 400 CAPSULE ORAL at 17:34

## 2017-03-19 RX ADMIN — DRONABINOL 5 MG: 2.5 CAPSULE ORAL at 17:34

## 2017-03-19 RX ADMIN — LOSARTAN POTASSIUM 25 MG: 25 TABLET, FILM COATED ORAL at 08:48

## 2017-03-19 RX ADMIN — DRONABINOL 5 MG: 2.5 CAPSULE ORAL at 05:53

## 2017-03-19 RX ADMIN — SUCRALFATE 1 G: 1 TABLET ORAL at 08:47

## 2017-03-19 RX ADMIN — GABAPENTIN 400 MG: 400 CAPSULE ORAL at 11:20

## 2017-03-19 RX ADMIN — FUROSEMIDE 160 MG: 80 TABLET ORAL at 17:34

## 2017-03-19 RX ADMIN — FUROSEMIDE 160 MG: 80 TABLET ORAL at 08:47

## 2017-03-19 RX ADMIN — GABAPENTIN 400 MG: 400 CAPSULE ORAL at 08:47

## 2017-03-19 RX ADMIN — NICOTINE 1 PATCH: 21 PATCH, EXTENDED RELEASE TRANSDERMAL at 20:09

## 2017-03-19 RX ADMIN — IPRATROPIUM BROMIDE AND ALBUTEROL SULFATE 3 ML: .5; 3 SOLUTION RESPIRATORY (INHALATION) at 12:03

## 2017-03-19 RX ADMIN — LOSARTAN POTASSIUM 25 MG: 25 TABLET, FILM COATED ORAL at 17:34

## 2017-03-19 RX ADMIN — POTASSIUM CHLORIDE 20 MEQ: 750 CAPSULE, EXTENDED RELEASE ORAL at 08:46

## 2017-03-19 RX ADMIN — HYDROMORPHONE HYDROCHLORIDE 0.5 MG: 1 INJECTION, SOLUTION INTRAMUSCULAR; INTRAVENOUS; SUBCUTANEOUS at 05:52

## 2017-03-19 RX ADMIN — GABAPENTIN 800 MG: 800 TABLET ORAL at 08:48

## 2017-03-19 RX ADMIN — GABAPENTIN 800 MG: 800 TABLET ORAL at 11:20

## 2017-03-19 RX ADMIN — TAMSULOSIN HYDROCHLORIDE 0.4 MG: 0.4 CAPSULE ORAL at 17:34

## 2017-03-19 RX ADMIN — HYDROMORPHONE HYDROCHLORIDE 0.5 MG: 1 INJECTION, SOLUTION INTRAMUSCULAR; INTRAVENOUS; SUBCUTANEOUS at 20:02

## 2017-03-19 RX ADMIN — HYDROMORPHONE HYDROCHLORIDE 0.5 MG: 1 INJECTION, SOLUTION INTRAMUSCULAR; INTRAVENOUS; SUBCUTANEOUS at 12:55

## 2017-03-19 RX ADMIN — HYDROCODONE BITARTRATE AND ACETAMINOPHEN 1 TABLET: 7.5; 325 TABLET ORAL at 06:30

## 2017-03-19 RX ADMIN — HYDROCODONE BITARTRATE AND ACETAMINOPHEN 1 TABLET: 7.5; 325 TABLET ORAL at 15:38

## 2017-03-19 RX ADMIN — CARVEDILOL 6.25 MG: 6.25 TABLET, FILM COATED ORAL at 17:34

## 2017-03-19 RX ADMIN — HYDROMORPHONE HYDROCHLORIDE 0.5 MG: 1 INJECTION, SOLUTION INTRAMUSCULAR; INTRAVENOUS; SUBCUTANEOUS at 08:44

## 2017-03-19 RX ADMIN — PANTOPRAZOLE SODIUM 40 MG: 40 TABLET, DELAYED RELEASE ORAL at 05:53

## 2017-03-19 NOTE — PROGRESS NOTES
"    Chief Complaint: Iatrogenic right pneumothorax  S/P: Right chest tube placement  POD # 1    Subjective:  Symptoms:  Improved.  He reports chest pain.  No shortness of breath.    Diet:  Adequate intake.    Activity level: Normal.    Pain:  He complains of pain that is mild.  Pain is well controlled.        Vital Signs:  Temp:  [97.7 °F (36.5 °C)-98.5 °F (36.9 °C)] 97.7 °F (36.5 °C)  Heart Rate:  [62-97] 75  Resp:  [16-20] 18  BP: (100-132)/(58-80) 112/63      Intake/Output Summary (Last 24 hours) at 03/19/17 1729  Last data filed at 03/19/17 1457   Gross per 24 hour   Intake   1510 ml   Output   2925 ml   Net  -1415 ml       Objective:  General Appearance:  Comfortable, well-appearing and in no acute distress.    Vital signs: (most recent): Blood pressure 112/63, pulse 75, temperature 97.7 °F (36.5 °C), temperature source Oral, resp. rate 18, height 68\" (172.7 cm), weight 200 lb (90.7 kg), SpO2 91 %.    Output: Producing urine.    Lungs:  Normal respiratory rate and normal effort.  Breath sounds clear to auscultation.    Heart: Normal rate.  Regular rhythm.  No murmur.   Abdomen: Abdomen is soft.  Bowel sounds are normal.   There is no abdominal tenderness.   There is no mass.   Extremities: There is no dependent edema.    Neurological: Patient is alert and oriented to person, place and time.  Normal strength.              Chest tube:   Site: Right, Clean, Dry, Intact and Securement device intact  Suction: -20 cm  Air Leak: negative  24 Hour Total: 50 cc    Results Review:     I reviewed the patient's new clinical results.  I reviewed the patient's new imaging results and agree with the interpretation.    Radiologist Interpretation:   Stable appearing right chest tube, left chest port. Heart,  mediastinum and pulmonary vasculature are unremarkable. Aeration at the  right base is improved, with minimal residual likely atelectasis at the  right lung base. No pleural effusion or pneumothorax. No acute " osseous  process.    Lab Results:      Lab Results   Component Value Date    WBC 10.50 03/19/2017    HGB 15.6 03/19/2017    HCT 48.1 03/19/2017     03/19/2017     Lab Results   Component Value Date     03/19/2017    K 3.2 (L) 03/19/2017    CL 96 (L) 03/19/2017    CO2 31.2 (H) 03/19/2017    BUN 12 03/19/2017    CREATININE 0.75 (L) 03/19/2017    GLUCOSE 97 03/19/2017        Assessment/Plan     Principal Problem:    Pneumothorax, right      Assessment & Plan     Pneumothorax has resolved with chest tube placement.  We'll stop suction on Pleur-evac today.  Check chest x-ray in the morning.  May be able to remove tube and discharge patient home tomorrow    Oscar Vega III, MD  [facility]  03/19/17  5:29 PM

## 2017-03-19 NOTE — PLAN OF CARE
Problem: Patient Care Overview (Adult)  Goal: Plan of Care Review  Outcome: Ongoing (interventions implemented as appropriate)    03/19/17 8024   Coping/Psychosocial Response Interventions   Plan Of Care Reviewed With patient   Patient Care Overview   Progress improving   Outcome Evaluation   Outcome Summary/Follow up Plan Pt stable. VSS. Chest tube now to water seal. Pain well controlled with Dilaudid and Lortab. Pt in chair most of the day and anxious to be possibly D/C'd home tomorrow pending CXR in AM.         Problem: Chest Tube Drainage Device (Adult)  Goal: Signs and Symptoms of Listed Potential Problems Will be Absent or Manageable (Chest Tube Drainage Device)  Outcome: Ongoing (interventions implemented as appropriate)    Problem: Fall Risk (Adult)  Goal: Absence of Falls  Outcome: Ongoing (interventions implemented as appropriate)    Problem: Pain, Acute (Adult)  Goal: Acceptable Pain Control/Comfort Level  Outcome: Ongoing (interventions implemented as appropriate)

## 2017-03-19 NOTE — PLAN OF CARE
Problem: Patient Care Overview (Adult)  Goal: Plan of Care Review  Outcome: Ongoing (interventions implemented as appropriate)    03/19/17 0114   Coping/Psychosocial Response Interventions   Plan Of Care Reviewed With patient;spouse   Patient Care Overview   Progress improving   Outcome Evaluation   Outcome Summary/Follow up Plan admitted with pneumothorax, ER MD placed right chest tube, -20 sx with no airleak noted, pain controlled with lortab and dilaudid, nicotine patch placed on left arm, O2 at 5 L nc         Problem: Chest Tube Drainage Device (Adult)  Goal: Signs and Symptoms of Listed Potential Problems Will be Absent or Manageable (Chest Tube Drainage Device)  Outcome: Ongoing (interventions implemented as appropriate)    Problem: Fall Risk (Adult)  Goal: Identify Related Risk Factors and Signs and Symptoms  Outcome: Outcome(s) achieved Date Met:  03/19/17  Goal: Absence of Falls  Outcome: Ongoing (interventions implemented as appropriate)    Problem: Pain, Acute (Adult)  Goal: Identify Related Risk Factors and Signs and Symptoms  Outcome: Outcome(s) achieved Date Met:  03/19/17  Goal: Acceptable Pain Control/Comfort Level  Outcome: Ongoing (interventions implemented as appropriate)
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
## 2017-03-20 ENCOUNTER — APPOINTMENT (OUTPATIENT)
Dept: GENERAL RADIOLOGY | Facility: HOSPITAL | Age: 62
End: 2017-03-20

## 2017-03-20 VITALS
DIASTOLIC BLOOD PRESSURE: 69 MMHG | TEMPERATURE: 98.5 F | OXYGEN SATURATION: 90 % | BODY MASS INDEX: 30.31 KG/M2 | HEART RATE: 76 BPM | HEIGHT: 68 IN | SYSTOLIC BLOOD PRESSURE: 114 MMHG | WEIGHT: 200 LBS | RESPIRATION RATE: 18 BRPM

## 2017-03-20 PROCEDURE — 94799 UNLISTED PULMONARY SVC/PX: CPT

## 2017-03-20 PROCEDURE — 25010000002 HYDROMORPHONE PER 4 MG: Performed by: THORACIC SURGERY (CARDIOTHORACIC VASCULAR SURGERY)

## 2017-03-20 PROCEDURE — 25010000002 HEPARIN (PORCINE) PER 1000 UNITS: Performed by: THORACIC SURGERY (CARDIOTHORACIC VASCULAR SURGERY)

## 2017-03-20 PROCEDURE — 99238 HOSP IP/OBS DSCHRG MGMT 30/<: CPT | Performed by: NURSE PRACTITIONER

## 2017-03-20 PROCEDURE — 63710000001 DRONABINOL PER 2.5 MG: Performed by: THORACIC SURGERY (CARDIOTHORACIC VASCULAR SURGERY)

## 2017-03-20 PROCEDURE — 71010 HC CHEST PA OR AP: CPT

## 2017-03-20 RX ORDER — CHOLESTYRAMINE 4 G/9G
1 POWDER, FOR SUSPENSION ORAL 2 TIMES DAILY
Status: DISCONTINUED | OUTPATIENT
Start: 2017-03-20 | End: 2017-03-20 | Stop reason: HOSPADM

## 2017-03-20 RX ADMIN — HYDROCODONE BITARTRATE AND ACETAMINOPHEN 1 TABLET: 7.5; 325 TABLET ORAL at 06:46

## 2017-03-20 RX ADMIN — BUDESONIDE AND FORMOTEROL FUMARATE DIHYDRATE 2 PUFF: 160; 4.5 AEROSOL RESPIRATORY (INHALATION) at 08:52

## 2017-03-20 RX ADMIN — PANTOPRAZOLE SODIUM 40 MG: 40 TABLET, DELAYED RELEASE ORAL at 06:46

## 2017-03-20 RX ADMIN — IPRATROPIUM BROMIDE AND ALBUTEROL SULFATE 3 ML: .5; 3 SOLUTION RESPIRATORY (INHALATION) at 08:52

## 2017-03-20 RX ADMIN — FUROSEMIDE 160 MG: 80 TABLET ORAL at 08:35

## 2017-03-20 RX ADMIN — HEPARIN SODIUM 5000 UNITS: 5000 INJECTION, SOLUTION INTRAVENOUS; SUBCUTANEOUS at 06:45

## 2017-03-20 RX ADMIN — ASPIRIN 81 MG: 81 TABLET ORAL at 08:35

## 2017-03-20 RX ADMIN — GABAPENTIN 400 MG: 400 CAPSULE ORAL at 11:50

## 2017-03-20 RX ADMIN — TAMSULOSIN HYDROCHLORIDE 0.4 MG: 0.4 CAPSULE ORAL at 08:36

## 2017-03-20 RX ADMIN — ALPRAZOLAM 0.5 MG: 0.5 TABLET ORAL at 00:01

## 2017-03-20 RX ADMIN — GABAPENTIN 400 MG: 400 CAPSULE ORAL at 08:35

## 2017-03-20 RX ADMIN — CARVEDILOL 6.25 MG: 6.25 TABLET, FILM COATED ORAL at 08:34

## 2017-03-20 RX ADMIN — POTASSIUM CHLORIDE 20 MEQ: 750 CAPSULE, EXTENDED RELEASE ORAL at 08:36

## 2017-03-20 RX ADMIN — IPRATROPIUM BROMIDE AND ALBUTEROL SULFATE 3 ML: .5; 3 SOLUTION RESPIRATORY (INHALATION) at 11:41

## 2017-03-20 RX ADMIN — SUCRALFATE 1 G: 1 TABLET ORAL at 08:35

## 2017-03-20 RX ADMIN — SUCRALFATE 1 G: 1 TABLET ORAL at 11:49

## 2017-03-20 RX ADMIN — GABAPENTIN 800 MG: 800 TABLET ORAL at 08:35

## 2017-03-20 RX ADMIN — HYDROMORPHONE HYDROCHLORIDE 0.5 MG: 1 INJECTION, SOLUTION INTRAMUSCULAR; INTRAVENOUS; SUBCUTANEOUS at 13:37

## 2017-03-20 RX ADMIN — CITALOPRAM 20 MG: 20 TABLET, FILM COATED ORAL at 08:35

## 2017-03-20 RX ADMIN — DRONABINOL 5 MG: 2.5 CAPSULE ORAL at 06:46

## 2017-03-20 RX ADMIN — LOSARTAN POTASSIUM 25 MG: 25 TABLET, FILM COATED ORAL at 08:36

## 2017-03-20 RX ADMIN — GABAPENTIN 800 MG: 800 TABLET ORAL at 11:50

## 2017-03-20 NOTE — PROGRESS NOTES
"    Chief Complaint: Iatrogenic right pneumothorax  S/P: Right chest tube placement  POD # 2    Subjective:  Symptoms:  Stable.  No shortness of breath.  (Doing well.  C/O chronic back pain otherwise doing well.  ).    Diet:  Adequate intake.  No nausea.    Activity level: Normal.    Pain:  He reports no pain.        Vital Signs:  Temp:  [97.8 °F (36.6 °C)-98.5 °F (36.9 °C)] 98.2 °F (36.8 °C)  Heart Rate:  [66-85] 66  Resp:  [16-20] 16  BP: (106-131)/(62-75) 117/68      Intake/Output Summary (Last 24 hours) at 03/20/17 0818  Last data filed at 03/20/17 0727   Gross per 24 hour   Intake   2590 ml   Output   4175 ml   Net  -1585 ml       Objective:  General Appearance:  Comfortable and in no acute distress.    Vital signs: (most recent): Blood pressure 117/68, pulse 77, temperature 98.2 °F (36.8 °C), temperature source Oral, resp. rate 18, height 68\" (172.7 cm), weight 200 lb (90.7 kg), SpO2 97 %.  Vital signs are normal.  No fever.    Lungs:  Normal respiratory rate and normal effort.  There are decreased breath sounds (right).  No wheezes.    Heart: Normal rate.  Regular rhythm.  No murmur.   Abdomen: Abdomen is soft and non-distended.    Extremities: Normal range of motion.  There is no dependent edema.    Neurological: Patient is alert and oriented to person, place and time.    Skin:  Warm and dry.              Chest tube:   Site: Right, Clean, Dry and Intact  Suction: waterseal  Air Leak: negative  24 Hour Total: 0    Results Review:     I reviewed the patient's new clinical results.  I reviewed the patient's new imaging results and agree with the interpretation.    Radiologist Interpretation:  Study Result   CLINICAL HISTORY: 61-year-old male with right pneumothorax; follow-up 2  days status post placement of right chest tube.      PORTABLE AP ERECT CHEST DATED 03/20/2017 AT 0529 HOURS      FINDINGS: When compared to the exam dated 03/19/2017 at 0536 hours, the  small caliber right chest tube is again " demonstrated. The left jugular  port catheter remains and terminates in the distribution of superior  vena cava. Cardiac silhouette remains upper normal caliber. There is  increased patchy atelectasis in the right lung base compared to the  radiograph 1 day ago. Trace patchy to strandy atelectasis is also  present in the left lower lung zone as before. Oxygen cannula tubing is  present about the neck and right axilla. Monitoring lead wires are  present.      CONCLUSION: Relatively low right lung volume compared to left with  increasing patchy right basilar atelectasis. No pneumothorax is seen  with the small caliber right chest tube remaining.             Lab Results:      Lab Results   Component Value Date    WBC 10.50 03/19/2017    HGB 15.6 03/19/2017    HCT 48.1 03/19/2017     03/19/2017     Lab Results   Component Value Date     03/19/2017    K 3.2 (L) 03/19/2017    CL 96 (L) 03/19/2017    CO2 31.2 (H) 03/19/2017    BUN 12 03/19/2017    CREATININE 0.75 (L) 03/19/2017    GLUCOSE 97 03/19/2017     Lab Results   Component Value Date    CALCIUM 9.6 03/19/2017     No results found for: APTT, INR     Assessment/Plan     Principal Problem:    Pneumothorax, right      Assessment:    Condition: In stable condition.  Improving.       Plan:   (CXR showed no pneumothorax.  No air leak present.  Will trial clamping tube this am.  Repeat CXR at 1300.  If remains stable, plans are to remove chest tube and send home today.  ).       Daniella Shore, APRN  [facility]  03/20/17  8:18 AM

## 2017-03-20 NOTE — CONSULTS
"Adult Nutrition  Assessment/PES    Patient Name:  Cody Simental  YOB: 1955  MRN: [id number]  Admit Date:  3/18/2017    Assessment Date:  3/20/2017        Reason for Assessment       03/20/17 1501    Reason for Assessment    Reason For Assessment/Visit identified at risk by screening criteria;nurse/nurse practitioner consult    Diagnosis --   pneumothorax                Anthropometrics       03/20/17 1502    Anthropometrics (Special Considerations)    Height Used for Calculations 1.727 m (5' 8\")    Weight Used for Calculations 90.7 kg (200 lb)    RD Calculated IBW 68.4    RD Calculated %     RD Calculated BMI (kg/m2) 30.4    Usual Body Weight (UBW)    Weight Loss 16.3 kg (36 lb)    Weight Loss Time Frame 2 months    Body Mass Index (BMI)    BMI Grade 30 - 34.9- obesity - grade I            Labs/Tests/Procedures/Meds       03/20/17 1503    Labs/Tests/Procedures/Meds    Diagnostic Test/Procedure Review reviewed    Labs/Tests Review Reviewed;K+    Medication Review Reviewed, pertinent   PPI, carafate     Significant Vitals reviewed            Physical Findings       03/20/17 1503    Physical Findings/Assessment    Additional Documentation --   B=20            Estimated/Assessed Needs       03/20/17 1503    Calculation Measurements    Weight Used For Calculations 90.7 kg (200 lb)    Height Used for Calculations 1.727 m (5' 8\")    Estimated/Assessed Energy Needs    Energy Need Method Kcal/kg    kcal/kg 20    20 Kcal/Kg (kcal) 1814.38    Estimated/Assessed Protein Needs    Weight Used for Protein Calculation 90.7 kg (200 lb)    Protein (gm/kg) 0.8    0.8 Gm Protein (gm) 72.58    Estimated/Assessed Fluid Needs    Fluid Need Method RDA method    RDA Method (mL)  1800            Nutrition Prescription Ordered       03/20/17 1503    Nutrition Prescription PO    Common Modifiers Cardiac;Consistent Carbohydrate;Renal            Evaluation of Received Nutrient/Fluid Intake       03/20/17 1504    PO " Evaluation    Number of Meals 3    % PO Intake 83              Problem/Interventions:        Problem 1       03/20/17 1504    Nutrition Diagnoses Problem 1    Problem 1 Inadequate Nutrient Intake    Etiology (related to) MNT for Treatment/Condition    Signs/Symptoms (evidenced by) Unintended Weight Change    Unintended Weight Change Loss    Number of Pounds Lost 38#    Weight loss time period 2 months                    Intervention Goal       03/20/17 1504    Intervention Goal    General Maintain nutrition    PO Tolerate PO;Maintain intake    Weight Maintain weight            Nutrition Intervention       03/20/17 1504    Nutrition Intervention    RD/Tech Action Care plan reviewd;Follow Tx progress;Interview for preference;Encourage intake;Supplement provided              Education/Evaluation       03/20/17 1504    Education    Education Will Instruct as appropriate    Monitor/Evaluation    Monitor Per protocol    Education Follow-up Reinforce PRN          Electronically signed by:  Jenni Gayle RD  03/20/17 3:04 PM

## 2017-03-20 NOTE — PLAN OF CARE
Problem: Patient Care Overview (Adult)  Goal: Plan of Care Review  Outcome: Ongoing (interventions implemented as appropriate)    03/20/17 0412   Coping/Psychosocial Response Interventions   Plan Of Care Reviewed With patient   Patient Care Overview   Progress improving       Goal: Adult Individualization and Mutuality  Outcome: Ongoing (interventions implemented as appropriate)    03/20/17 0412   Individualization   Patient Specific Goals get chest tube out and go home today   Patient Specific Interventions enc. tcdb, medicated for pain per mar         Problem: Chest Tube Drainage Device (Adult)  Goal: Signs and Symptoms of Listed Potential Problems Will be Absent or Manageable (Chest Tube Drainage Device)  Outcome: Ongoing (interventions implemented as appropriate)    03/20/17 0412   Chest Tube Drainage Device   Problems Assessed (Chest Tube Drainage Device) all   Problems Present (Chest Tube Drainage Device) pain         Problem: Fall Risk (Adult)  Goal: Absence of Falls  Outcome: Ongoing (interventions implemented as appropriate)    03/20/17 0412   Fall Risk (Adult)   Absence of Falls making progress toward outcome         Problem: Pain, Acute (Adult)  Goal: Acceptable Pain Control/Comfort Level  Outcome: Ongoing (interventions implemented as appropriate)    03/20/17 0412   Pain, Acute (Adult)   Acceptable Pain Control/Comfort Level making progress toward outcome

## 2017-03-20 NOTE — DISCHARGE SUMMARY
Patient Care Team:  Melecio Orozco Jr., MD as PCP - General (Family Medicine)  Melecio Orozco Jr., MD as Consulting Physician (Family Medicine)  Ricardo Robbins MD as Consulting Physician (Pain Medicine)  Cody Alvarenga II, MD as Consulting Physician (Hematology and Oncology)  Sowmya Tavera MD as Consulting Physician (Radiation Oncology)  Ulices Cruz MD as Referring Physician (Pulmonary Disease)    Date of Admission:  3/18/2017  Date of Discharge:  3/20/2017    Discharge Diagnosis: Iatrogenic right pneumothorax    Presenting Problem  Pneumothorax, right [J93.9]     History of Present Illness  Cody Simental is a 61 y.o. male who presented with advanced stage lung cancer. He was not an operative candidate. He has been undergoing radiation and chemotherapy for treatment. He has had quite a bit of right chest wall discomfort. He was recently seen by his pain management doctor for intercostal nerve blocks. After he had these blocks performed on the right he developed pleuritic pain and shortness of breath. The shortness of breath progressed and he presented to the emergency room. Chest x-ray and CT the chest were performed showing a large right pneumothorax. Right pleural catheter was placed by emergency room physician and the patient has been admitted for further treatment.    Hospital Course  Cody Simental was admitted to [facility] for close observation, pain management, and management of chest tube.  No pneumothorax was present after chest tube was inserted.  No air leak.  His chest tube was placed to water seal after 24 hours.  This morning, CXR remained stable. No air leak.  His chest tube was clamped at 0900.  Repeat CXR 4 hours later remained stable.  His chest tube was removed without difficulty and dressing applied.  He is stable for discharge home.  He is on oxygen at 2L/M which he has at home.  He also sees pain management and is prescribed Dilaudid po.  Instructed to continue as directed.  No  "new prescription given during this hospital stay.      Procedures Performed  Right chest tube placement per ER physician.       Consults:   Consults     Date and Time Order Name Status Description    3/18/2017 1709 Surgery (on-call MD unless specified) Completed           Pertinent Test Results: radiology: CXR: stable  Right apical 8 mm pneumothorax otherwise stable.     Condition on Discharge:  Stable    Vital Signs  Temp:  [97.8 °F (36.6 °C)-98.5 °F (36.9 °C)] 98.5 °F (36.9 °C)  Heart Rate:  [66-85] 76  Resp:  [16-20] 18  BP: (106-131)/(62-75) 114/69    Physical Exam:  Objective:  General Appearance: Comfortable and in no acute distress.   Vital signs: (most recent): Blood pressure 117/68, pulse 77, temperature 98.2 °F (36.8 °C), temperature source Oral, resp. rate 18, height 68\" (172.7 cm), weight 200 lb (90.7 kg), SpO2 97 %. Vital signs are normal. No fever.   Lungs: Normal respiratory rate and normal effort. There are decreased breath sounds (right). No wheezes.   Heart: Normal rate. Regular rhythm. No murmur.   Abdomen: Abdomen is soft and non-distended.   Extremities: Normal range of motion. There is no dependent edema.   Neurological: Patient is alert and oriented to person, place and time.   Skin: Warm and dry.     Discharge Disposition  Home today    Discharge Medications   Cody Simental   Home Medication Instructions ALISON:[id number]    Printed on:03/20/17 8065   Medication Information                      albuterol (PROVENTIL HFA;VENTOLIN HFA) 108 (90 BASE) MCG/ACT inhaler  Inhale 2 puffs Every 4 (Four) Hours As Needed for wheezing.             ALPRAZolam (XANAX) 0.5 MG tablet  Take 0.5 mg by mouth 2 (two) times a day as needed for anxiety.             Armodafinil 250 MG tablet               aspirin 81 MG EC tablet  Take 81 mg by mouth Daily.             budesonide-formoterol (SYMBICORT) 160-4.5 MCG/ACT inhaler  Inhale 2 puffs 2 (Two) Times a Day.             carvedilol (COREG) 6.25 MG tablet  Take " 6.25 mg by mouth 2 (two) times a day with meals.             cholestyramine (QUESTRAN) 4 G packet  Take 1 packet by mouth 3 (Three) Times a Day With Meals. 1 packet  TID PRN diarrhea             citalopram (CeleXA) 20 MG tablet  Take 1 tablet by mouth Daily.             diphenoxylate-atropine (LOMOTIL) 2.5-0.025 MG per tablet  TAKE ONE TABLET BY MOUTH 4 TIMES DAILY AS NEEDED FOR DIARRHEA             dronabinol (MARINOL) 5 MG capsule  Take 1 capsule by mouth 2 (Two) Times a Day Before Meals. For weight loss             esomeprazole (nexIUM) 40 MG capsule  Take 1 capsule by mouth Every Morning Before Breakfast.             furosemide (LASIX) 80 MG tablet  Take 160 mg by mouth 2 (two) times a day.             gabapentin (NEURONTIN) 400 MG capsule  Take 400 mg by mouth 4 (Four) Times a Day. In addition to 800mg tabs, pt reports is a separate prescription             gabapentin (NEURONTIN) 800 MG tablet  Take 800 mg by mouth 4 (four) times a day.             HYDROmorphone (DILAUDID) 4 MG tablet  Take 8 mg by mouth Every 3 (Three) Hours As Needed.             ipratropium-albuterol (DUO-NEB) 0.5-2.5 mg/mL nebulizer  Take 3 mL by nebulization 4 (Four) Times a Day.             Loperamide HCl (IMODIUM PO)  Take  by mouth 2 (Two) Times a Day.             losartan (COZAAR) 25 MG tablet  Take 25 mg by mouth 2 (Two) Times a Day.             METFORMIN HCL ER, OSM, PO  Take 1,000 mg by mouth 2 (two) times a day.             nicotine (NICODERM CQ) 21 MG/24HR patch  Place 1 patch on the skin Daily.             nystatin (MYCOSTATIN) 156336 UNIT/ML suspension               O2 (OXYGEN)  Inhale 3 L/min As Needed.             ondansetron (ZOFRAN) 8 MG tablet  Take 1 tablet by mouth Every 8 (Eight) Hours As Needed for nausea or vomiting.             potassium chloride (KLOR-CON) 20 MEQ CR tablet  Take 40 mEq by mouth 2 (two) times a day.             prochlorperazine (COMPAZINE) 10 MG tablet  Take 1 tablet by mouth Every 6 (Six) Hours As  Needed for nausea or vomiting.             sucralfate (CARAFATE) 1 G tablet  Take 1 tablet by mouth 4 (Four) Times a Day. Dissolve tablet in 30-45 ml of water and drink.             tamsulosin (FLOMAX) 0.4 MG capsule 24 hr capsule  Take 0.4 mg by mouth 2 (Two) Times a Day.             Testosterone Enanthate 200 MG/ML solution  Inject 1 mL into the shoulder, thigh, or buttocks Take As Directed. Every 10 days             UNKNOWN TO PATIENT  Continuous. Pain pump Filled 11-17-16 with dilaudid per pt report             ZETIA 10 MG tablet  Take 10 mg by mouth Daily.                 Discharge Instructions:  · No heavy lifting, pushing, pulling greater than 10 pounds.  · No driving up until 2 weeks after surgery and no longer taking narcotics.  · Resume home diet as tolerated.  · May shower on day of discharge. Remove dressing from post chest tube site in 48 hours, then clean with antibacterial soap, and apply gauze dressing or band-aid as needed for any drainage.  No dressing needed once no longer draining.          Follow-up Appointments  Future Appointments  Date Time Provider Department Center   4/12/2017 12:30 PM YASMIN Salas TS [facility] None   4/12/2017 1:15 PM INFU CBC [facility]RE PORT CHAIR  INFUS [facility]   4/12/2017 2:00 PM LIZA PET CT  [facility] PET [facility]   4/17/2017 2:40 PM VITALS ONLY CBC KRE  LAB KRES LAG   4/17/2017 3:00 PM Cody Alvarenga II, MD [facility] CBC KRES  CBC Liza   5/8/2017 1:45 PM Melecio Orozco Jr., MD [facility] PC [id number] None     Additional Instructions for the Follow-ups that You Need to Schedule     Discharge Follow-up with Specialty    As directed    Follow Up:  2 Weeks   Follow Up Details:  F/U in 2 weeks with CXR                   For any questions regarding patient's stay, please refer to patient's chart.    YASMIN Conde  [facility]  03/20/17  2:07 PM

## 2017-03-20 NOTE — PROGRESS NOTES
Discharge Planning Assessment  [facility]     Patient Name: Cody Simental  MRN: [id number]  Today's Date: 3/20/2017    Admit Date: 3/18/2017          Discharge Needs Assessment       03/20/17 1046    Living Environment    Lives With spouse   mobile home    Living Arrangements mobile home            Discharge Plan       03/20/17 1047    Case Management/Social Work Plan    Plan Home    Additional Comments Imm 3-20. Signed, dated, timed, by patient. Patient lives with his wife, Claudette Parmar in a mobile home there are  5 steps to enter.  He plans to go home on discharge. He hopes today. He has a c-pap  and gets his supplies from [employer] . Patient is Medicare only and says he does his best to get his prescriptions. No HH, No other DME. He has used HH  in the past but  could not remember which company her used .  Patient plans to go home on discharge and states his wife will transport him. CCP to follow..........  JONAH Sifuentes        Discharge Placement     No information found                Demographic Summary       03/20/17 1045    Referral Information    Admission Type inpatient    Arrived From home or self-care    Referral Source admission list    Reason For Consult discharge planning    Record Reviewed medical record    Primary Care Physician Information    Name Dr. Melecio Orozco MD            Functional Status       03/20/17 1046    Functional Status Current    Ambulation 2-->assistive person    Transferring 2-->assistive person    Toileting 0-->independent    Bathing 0-->independent    Dressing 0-->independent    Eating 0-->independent    Communication 0-->understands/communicates without difficulty    Functional Status Prior    Ambulation 0-->independent    Transferring 0-->independent    Toileting 0-->independent    Bathing 0-->independent    Dressing 0-->independent    Eating 0-->independent            Psychosocial     None            Abuse/Neglect     None            Legal     None             Substance Abuse     None            Patient Forms     None          JONAH Sifuentes

## 2017-03-27 DIAGNOSIS — J95.811 POSTPROCEDURAL PNEUMOTHORAX: Primary | ICD-10-CM

## 2017-04-03 RX ORDER — ARMODAFINIL 250 MG/1
TABLET ORAL
Qty: 30 TABLET | Refills: 0 | Status: CANCELLED | OUTPATIENT
Start: 2017-04-03

## 2017-04-06 ENCOUNTER — TELEPHONE (OUTPATIENT)
Dept: PSYCHIATRY | Facility: HOSPITAL | Age: 62
End: 2017-04-06

## 2017-04-06 NOTE — TELEPHONE ENCOUNTER
Request received for refill of Armodafinil. Pt called and assessed via phone prior to initiation of request. Pt reports mood to be happy and positive. Is taking medication almost daily, reporting decrease in fatigue and increase in ability to participate in enjoyable activities. Denies SE. States negatively affected his mood at first but no longer does. Sleep is fair; reports frequent urination but ability to resume sleep easily. Appetite is good; hx of weight loss associated with cancer dx but recent stabilization. Pt endorses peace with situation and praises support network. Plans to go on vacation to [place] this Sunday for two weeks. Discussed importance of following up in clinic. Pt agrees to follow up after trip on April 26 at 10am. Appt made with Dr. Dey.    Case discussed with Dr. Dey. Approval for refill. Armodafinil 250 mg PO qday. Quantity 30 Refill 0 called in to [facility].

## 2017-04-07 RX ORDER — ARMODAFINIL 250 MG/1
250 TABLET ORAL DAILY
Qty: 30 TABLET | Refills: 0
Start: 2017-04-07 | End: 2017-04-26 | Stop reason: SDUPTHER

## 2017-04-12 ENCOUNTER — APPOINTMENT (OUTPATIENT)
Dept: ONCOLOGY | Facility: HOSPITAL | Age: 62
End: 2017-04-12

## 2017-04-17 ENCOUNTER — APPOINTMENT (OUTPATIENT)
Dept: LAB | Facility: HOSPITAL | Age: 62
End: 2017-04-17

## 2017-04-25 ENCOUNTER — HOSPITAL ENCOUNTER (OUTPATIENT)
Dept: PET IMAGING | Facility: HOSPITAL | Age: 62
Discharge: HOME | End: 2017-04-25
Attending: INTERNAL MEDICINE | Admitting: INTERNAL MEDICINE

## 2017-04-25 ENCOUNTER — INFUSION (OUTPATIENT)
Dept: ONCOLOGY | Facility: HOSPITAL | Age: 62
End: 2017-04-25

## 2017-04-25 DIAGNOSIS — Z45.2 FITTING AND ADJUSTMENT OF VASCULAR CATHETER: ICD-10-CM

## 2017-04-25 DIAGNOSIS — C34.2 MALIGNANT NEOPLASM OF MIDDLE LOBE OF RIGHT LUNG (HCC): Primary | ICD-10-CM

## 2017-04-25 DIAGNOSIS — C34.2 MALIGNANT NEOPLASM OF MIDDLE LOBE OF RIGHT LUNG (HCC): ICD-10-CM

## 2017-04-25 LAB
ALBUMIN SERPL-MCNC: 3.7 G/DL (ref 3.5–5.2)
ALBUMIN/GLOB SERPL: 0.9 G/DL (ref 1.1–2.4)
ALP SERPL-CCNC: 103 U/L (ref 38–116)
ALT SERPL W P-5'-P-CCNC: 14 U/L (ref 0–41)
ANION GAP SERPL CALCULATED.3IONS-SCNC: 8.1 MMOL/L
AST SERPL-CCNC: 17 U/L (ref 0–40)
BASOPHILS # BLD AUTO: 0.09 10*3/MM3 (ref 0–0.1)
BASOPHILS NFR BLD AUTO: 1.4 % (ref 0–1.1)
BILIRUB SERPL-MCNC: 0.3 MG/DL (ref 0.1–1.2)
BUN BLD-MCNC: 6 MG/DL (ref 6–20)
BUN/CREAT SERPL: 8.6 (ref 7.3–30)
CALCIUM SPEC-SCNC: 9 MG/DL (ref 8.5–10.2)
CHLORIDE SERPL-SCNC: 89 MMOL/L (ref 98–107)
CO2 SERPL-SCNC: 36.9 MMOL/L (ref 22–29)
CREAT BLD-MCNC: 0.7 MG/DL (ref 0.7–1.3)
CREAT BLDA-MCNC: 0.7 MG/DL (ref 0.6–1.3)
DEPRECATED RDW RBC AUTO: 45.4 FL (ref 37–49)
EOSINOPHIL # BLD AUTO: 0.24 10*3/MM3 (ref 0–0.36)
EOSINOPHIL NFR BLD AUTO: 3.6 % (ref 1–5)
ERYTHROCYTE [DISTWIDTH] IN BLOOD BY AUTOMATED COUNT: 12.4 % (ref 11.7–14.5)
GFR SERPL CREATININE-BSD FRML MDRD: 115 ML/MIN/1.73
GLOBULIN UR ELPH-MCNC: 3.9 GM/DL (ref 1.8–3.5)
GLUCOSE BLD-MCNC: 105 MG/DL (ref 74–124)
HCT VFR BLD AUTO: 48 % (ref 40–49)
HGB BLD-MCNC: 16 G/DL (ref 13.5–16.5)
IMM GRANULOCYTES # BLD: 0.02 10*3/MM3 (ref 0–0.03)
IMM GRANULOCYTES NFR BLD: 0.3 % (ref 0–0.5)
LYMPHOCYTES # BLD AUTO: 1.29 10*3/MM3 (ref 1–3.5)
LYMPHOCYTES NFR BLD AUTO: 19.6 % (ref 20–49)
MCH RBC QN AUTO: 33.2 PG (ref 27–33)
MCHC RBC AUTO-ENTMCNC: 33.3 G/DL (ref 32–35)
MCV RBC AUTO: 99.6 FL (ref 83–97)
MONOCYTES # BLD AUTO: 0.62 10*3/MM3 (ref 0.25–0.8)
MONOCYTES NFR BLD AUTO: 9.4 % (ref 4–12)
NEUTROPHILS # BLD AUTO: 4.32 10*3/MM3 (ref 1.5–7)
NEUTROPHILS NFR BLD AUTO: 65.7 % (ref 39–75)
NRBC BLD MANUAL-RTO: 0 /100 WBC (ref 0–0)
PLATELET # BLD AUTO: 237 10*3/MM3 (ref 150–375)
PMV BLD AUTO: 9 FL (ref 8.9–12.1)
POTASSIUM BLD-SCNC: 3.6 MMOL/L (ref 3.5–4.7)
PROT SERPL-MCNC: 7.6 G/DL (ref 6.3–8)
RBC # BLD AUTO: 4.82 10*6/MM3 (ref 4.3–5.5)
SODIUM BLD-SCNC: 134 MMOL/L (ref 134–145)
WBC NRBC COR # BLD: 6.58 10*3/MM3 (ref 4–10)

## 2017-04-25 PROCEDURE — 80053 COMPREHEN METABOLIC PANEL: CPT

## 2017-04-25 PROCEDURE — 25010000002 HEPARIN FLUSH (PORCINE) 100 UNIT/ML SOLUTION: Performed by: INTERNAL MEDICINE

## 2017-04-25 PROCEDURE — 85025 COMPLETE CBC W/AUTO DIFF WBC: CPT

## 2017-04-25 PROCEDURE — 0 DIATRIZOATE MEGLUMINE & SODIUM PER 1 ML: Performed by: INTERNAL MEDICINE

## 2017-04-25 PROCEDURE — 71260 CT THORAX DX C+: CPT

## 2017-04-25 PROCEDURE — 74177 CT ABD & PELVIS W/CONTRAST: CPT

## 2017-04-25 PROCEDURE — 0 IOPAMIDOL 61 % SOLUTION: Performed by: INTERNAL MEDICINE

## 2017-04-25 PROCEDURE — 36415 COLL VENOUS BLD VENIPUNCTURE: CPT

## 2017-04-25 PROCEDURE — 96523 IRRIG DRUG DELIVERY DEVICE: CPT | Performed by: INTERNAL MEDICINE

## 2017-04-25 PROCEDURE — 82565 ASSAY OF CREATININE: CPT

## 2017-04-25 RX ORDER — SODIUM CHLORIDE 0.9 % (FLUSH) 0.9 %
10 SYRINGE (ML) INJECTION AS NEEDED
Status: DISCONTINUED | OUTPATIENT
Start: 2017-04-25 | End: 2017-04-25 | Stop reason: HOSPADM

## 2017-04-25 RX ORDER — SODIUM CHLORIDE 0.9 % (FLUSH) 0.9 %
10 SYRINGE (ML) INJECTION AS NEEDED
OUTPATIENT
Start: 2017-04-25

## 2017-04-25 RX ORDER — HEPARIN SODIUM (PORCINE) LOCK FLUSH IV SOLN 100 UNIT/ML 100 UNIT/ML
500 SOLUTION INTRAVENOUS AS NEEDED
OUTPATIENT
Start: 2017-04-25

## 2017-04-25 RX ADMIN — Medication 10 ML: at 12:00

## 2017-04-25 RX ADMIN — IOPAMIDOL 85 ML: 612 INJECTION, SOLUTION INTRAVENOUS at 12:01

## 2017-04-25 RX ADMIN — SODIUM CHLORIDE, PRESERVATIVE FREE 500 UNITS: 5 INJECTION INTRAVENOUS at 12:00

## 2017-04-25 RX ADMIN — DIATRIZOATE MEGLUMINE AND DIATRIZOATE SODIUM 30 ML: 660; 100 LIQUID ORAL; RECTAL at 11:30

## 2017-04-26 ENCOUNTER — OFFICE VISIT (OUTPATIENT)
Dept: PSYCHIATRY | Facility: HOSPITAL | Age: 62
End: 2017-04-26

## 2017-04-26 DIAGNOSIS — F41.1 GENERALIZED ANXIETY DISORDER: Primary | ICD-10-CM

## 2017-04-26 DIAGNOSIS — F33.1 MODERATE EPISODE OF RECURRENT MAJOR DEPRESSIVE DISORDER (HCC): ICD-10-CM

## 2017-04-26 PROCEDURE — 99214 OFFICE O/P EST MOD 30 MIN: CPT | Performed by: PSYCHIATRY & NEUROLOGY

## 2017-04-26 RX ORDER — CLONAZEPAM 0.5 MG/1
1 TABLET ORAL NIGHTLY PRN
Qty: 14 TABLET | Refills: 0
Start: 2017-04-26 | End: 2017-08-16 | Stop reason: ALTCHOICE

## 2017-04-26 RX ORDER — ARMODAFINIL 250 MG/1
250 TABLET ORAL DAILY
Qty: 30 TABLET | Refills: 0 | Status: SHIPPED | OUTPATIENT
Start: 2017-04-26 | End: 2017-07-03 | Stop reason: SDUPTHER

## 2017-04-26 RX ORDER — ALPRAZOLAM 0.5 MG/1
0.5 TABLET ORAL 2 TIMES DAILY PRN
Qty: 60 TABLET | Refills: 1 | Status: SHIPPED | OUTPATIENT
Start: 2017-04-26 | End: 2017-07-03 | Stop reason: SDUPTHER

## 2017-04-28 ENCOUNTER — OFFICE VISIT (OUTPATIENT)
Dept: ONCOLOGY | Facility: CLINIC | Age: 62
End: 2017-04-28

## 2017-04-28 ENCOUNTER — APPOINTMENT (OUTPATIENT)
Dept: LAB | Facility: HOSPITAL | Age: 62
End: 2017-04-28

## 2017-04-28 VITALS
BODY MASS INDEX: 30.86 KG/M2 | TEMPERATURE: 98.1 F | HEIGHT: 68 IN | WEIGHT: 203.6 LBS | OXYGEN SATURATION: 93 % | SYSTOLIC BLOOD PRESSURE: 126 MMHG | RESPIRATION RATE: 19 BRPM | HEART RATE: 75 BPM | DIASTOLIC BLOOD PRESSURE: 58 MMHG

## 2017-04-28 DIAGNOSIS — C34.2 MALIGNANT NEOPLASM OF MIDDLE LOBE OF RIGHT LUNG (HCC): Primary | ICD-10-CM

## 2017-04-28 PROCEDURE — G0463 HOSPITAL OUTPT CLINIC VISIT: HCPCS | Performed by: INTERNAL MEDICINE

## 2017-04-28 PROCEDURE — 99215 OFFICE O/P EST HI 40 MIN: CPT | Performed by: INTERNAL MEDICINE

## 2017-04-28 RX ORDER — GABAPENTIN 600 MG/1
600 TABLET ORAL 4 TIMES DAILY
COMMUNITY
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
## 2017-05-04 ENCOUNTER — TELEPHONE (OUTPATIENT)
Dept: OTHER | Facility: HOSPITAL | Age: 62
End: 2017-05-04

## 2017-05-08 ENCOUNTER — OFFICE VISIT (OUTPATIENT)
Dept: INTERNAL MEDICINE | Facility: CLINIC | Age: 62
End: 2017-05-08

## 2017-05-08 VITALS
SYSTOLIC BLOOD PRESSURE: 132 MMHG | HEART RATE: 84 BPM | DIASTOLIC BLOOD PRESSURE: 70 MMHG | BODY MASS INDEX: 32.54 KG/M2 | TEMPERATURE: 96.9 F | WEIGHT: 214 LBS | RESPIRATION RATE: 16 BRPM

## 2017-05-08 DIAGNOSIS — C34.2 MALIGNANT NEOPLASM OF MIDDLE LOBE OF RIGHT LUNG (HCC): Primary | ICD-10-CM

## 2017-05-08 DIAGNOSIS — E11.65 TYPE 2 DIABETES MELLITUS WITH HYPERGLYCEMIA, WITH LONG-TERM CURRENT USE OF INSULIN (HCC): ICD-10-CM

## 2017-05-08 DIAGNOSIS — F51.04 PSYCHOPHYSIOLOGICAL INSOMNIA: ICD-10-CM

## 2017-05-08 DIAGNOSIS — I10 ESSENTIAL HYPERTENSION: ICD-10-CM

## 2017-05-08 DIAGNOSIS — Z79.4 TYPE 2 DIABETES MELLITUS WITH HYPERGLYCEMIA, WITH LONG-TERM CURRENT USE OF INSULIN (HCC): ICD-10-CM

## 2017-05-08 PROCEDURE — 99214 OFFICE O/P EST MOD 30 MIN: CPT | Performed by: FAMILY MEDICINE

## 2017-05-08 RX ORDER — CYPROHEPTADINE HYDROCHLORIDE 4 MG/1
TABLET ORAL
Qty: 30 TABLET | Refills: 0 | Status: SHIPPED | OUTPATIENT
Start: 2017-05-08 | End: 2017-12-03

## 2017-05-08 RX ORDER — FLUCONAZOLE 150 MG/1
TABLET ORAL
COMMUNITY
Start: 2017-05-03 | End: 2017-12-03

## 2017-06-01 RX ORDER — CITALOPRAM 20 MG/1
TABLET ORAL
Qty: 30 TABLET | Refills: 0 | Status: SHIPPED | OUTPATIENT
Start: 2017-06-01 | End: 2017-07-02 | Stop reason: SDUPTHER

## 2017-06-02 ENCOUNTER — HOSPITAL ENCOUNTER (OUTPATIENT)
Dept: PET IMAGING | Facility: HOSPITAL | Age: 62
Discharge: HOME | End: 2017-06-02
Attending: INTERNAL MEDICINE

## 2017-06-02 ENCOUNTER — TELEPHONE (OUTPATIENT)
Dept: ONCOLOGY | Facility: CLINIC | Age: 62
End: 2017-06-02

## 2017-06-05 ENCOUNTER — HOSPITAL ENCOUNTER (OUTPATIENT)
Dept: PET IMAGING | Facility: HOSPITAL | Age: 62
End: 2017-06-05
Attending: INTERNAL MEDICINE

## 2017-06-08 RX ORDER — DRONABINOL 5 MG/1
CAPSULE ORAL
Qty: 60 CAPSULE | Refills: 2 | Status: SHIPPED | OUTPATIENT
Start: 2017-06-08 | End: 2017-08-08 | Stop reason: SDUPTHER

## 2017-06-09 ENCOUNTER — APPOINTMENT (OUTPATIENT)
Dept: ONCOLOGY | Facility: CLINIC | Age: 62
End: 2017-06-09

## 2017-06-09 ENCOUNTER — APPOINTMENT (OUTPATIENT)
Dept: LAB | Facility: HOSPITAL | Age: 62
End: 2017-06-09

## 2017-07-03 ENCOUNTER — DOCUMENTATION (OUTPATIENT)
Dept: PSYCHIATRY | Facility: HOSPITAL | Age: 62
End: 2017-07-03

## 2017-07-03 ENCOUNTER — TELEPHONE (OUTPATIENT)
Dept: PSYCHIATRY | Facility: HOSPITAL | Age: 62
End: 2017-07-03

## 2017-07-03 RX ORDER — ARMODAFINIL 250 MG/1
250 TABLET ORAL DAILY
Qty: 30 TABLET | Refills: 1 | OUTPATIENT
Start: 2017-07-03 | End: 2017-08-10 | Stop reason: SDUPTHER

## 2017-07-03 RX ORDER — ALPRAZOLAM 0.5 MG/1
0.5 TABLET ORAL 2 TIMES DAILY PRN
Qty: 60 TABLET | Refills: 1 | OUTPATIENT
Start: 2017-07-03 | End: 2017-08-16 | Stop reason: SDUPTHER

## 2017-07-03 RX ORDER — CITALOPRAM 20 MG/1
TABLET ORAL
Qty: 30 TABLET | Refills: 0 | Status: SHIPPED | OUTPATIENT
Start: 2017-07-03 | End: 2017-08-04 | Stop reason: SDUPTHER

## 2017-07-03 NOTE — PROGRESS NOTES
Subjective   Patient ID: Cody Simental is a 62 y.o. male is here today for follow-up..     History of Present Illness   Pt presents to clinic for continued mgmt of mood and fatigue alongside cancer dx. Pt states cancer treatment seems to be working, with no progression in disease. Planned follow up in 60 days for continued maintenance. Current PHQ9=6 and GAD7=10. States this is not indicitive of last few days without armodafinil but generally, when taking, these numbers are accurate. Pt continues to reports frustration with physical limitations, particularly with humidity and heat of breathing. Enjoys managing his lawn and watching the birds and squirrels when physically able. States ability to participate in activity is significantly increased with utilization of armodafinil. Pt face brightness when discussing different birds he has seen and photographing them. Continues to reminisce on time spent in [place].   Without medicine, pt states he is excessively drowsy, sleeping too much. With armodafinil, pt states he sleep appx 4-5 hours nightly, which is baseline for him. Is not wearing CPAP regularly. Discussed benefit of wearing including need for sleep regardless of energy experienced with armodafinil. Endorses appropriate appetite, stable weight. Pt continues to endorse increased anxiety surrounding ca dx. Hypervigilant regarding recurrence, burden on wife. Is prescribed xanax 0.5 mg bid PRN, which he states he continues to use at night. Continues with alcohol sobriety. Would love to quit smoking but is currently smoking appx 1 ppd. Support offered.    The following portions of the patient's history were reviewed and updated as appropriate:   He  has a past medical history of Acid reflux; Alcohol abuse; Anxiety; Arthritis; Back pain; Cancer; COPD (chronic obstructive pulmonary disease); Coronary artery disease; Depression; Diabetes mellitus; Erectile dysfunction; Gastritis; History of heart attack (2014);  Hypercholesteremia; Hypertension; Lung cancer; Lung cancer; Neuropathy; Sleep apnea; and Tobacco abuse.  He  does not have any pertinent problems on file.  He  has a past surgical history that includes Back surgery; Bronchoscopy (N/A, 10/27/2016); Colonoscopy (2014); Foot fracture surgery (Left); Coronary angioplasty with stent; Facial fracture surgery (1980'S); Pain Pump Insertion/Revision; and insj tunneled cvc w/o subq port/ age 7 yr/> (Left, 11/18/2016).  His family history includes Alcohol abuse in his other; Breast cancer (age of onset: 60) in his sister; Depression in his mother and sister; Heart attack in his father; Kidney cancer (age of onset: 65) in his brother; Lung disease in his other; Stroke in his mother.  He  reports that he has been smoking Cigarettes.  He has a 10.00 pack-year smoking history. He has never used smokeless tobacco. He reports that he does not drink alcohol or use illicit drugs.  Current Outpatient Prescriptions   Medication Sig Dispense Refill   • albuterol (PROVENTIL HFA;VENTOLIN HFA) 108 (90 BASE) MCG/ACT inhaler Inhale 2 puffs Every 4 (Four) Hours As Needed for wheezing.     • ALPRAZolam (XANAX) 0.5 MG tablet Take 1 tablet by mouth 2 (Two) Times a Day As Needed for Anxiety. 60 tablet 1   • Armodafinil 250 MG tablet Take 250 mg by mouth Daily. 30 tablet 0   • aspirin 81 MG EC tablet Take 81 mg by mouth Daily.     • budesonide-formoterol (SYMBICORT) 160-4.5 MCG/ACT inhaler Inhale 2 puffs 2 (Two) Times a Day.     • carvedilol (COREG) 6.25 MG tablet Take 6.25 mg by mouth 2 (two) times a day with meals.     • cholestyramine (QUESTRAN) 4 G packet Take 1 packet by mouth 3 (Three) Times a Day With Meals. 1 packet  TID PRN diarrhea 60 packet 0   • citalopram (CeleXA) 20 MG tablet TAKE ONE TABLET BY MOUTH ONCE DAILY 30 tablet 0   • clonazePAM (KLONOPIN) 0.5 MG tablet Take 2 tablets by mouth At Night As Needed for Seizures for up to 14 doses. 14 tablet 0   • cyproheptadine (PERIACTIN) 4  MG tablet 1/2 tablet at bedtime for appetite and sleep 30 tablet 0   • diphenoxylate-atropine (LOMOTIL) 2.5-0.025 MG per tablet TAKE ONE TABLET BY MOUTH 4 TIMES DAILY AS NEEDED FOR DIARRHEA 60 tablet 1   • dronabinol (MARINOL) 5 MG capsule TAKE ONE CAPSULE BY MOUTH TWICE DAILY BEFORE  MEALS  FOR  WEIGHT  LOSS 60 capsule 2   • esomeprazole (nexIUM) 40 MG capsule Take 1 capsule by mouth Every Morning Before Breakfast. 90 capsule 3   • fluconazole (DIFLUCAN) 150 MG tablet      • furosemide (LASIX) 80 MG tablet Take 160 mg by mouth 2 (two) times a day.     • gabapentin (NEURONTIN) 600 MG tablet Take 600 mg by mouth 4 (Four) Times a Day. 2 tabs 4x/day     • HYDROmorphone (DILAUDID) 4 MG tablet Take 8 mg by mouth Every 3 (Three) Hours As Needed.     • ipratropium-albuterol (DUO-NEB) 0.5-2.5 mg/mL nebulizer Take 3 mL by nebulization 4 (Four) Times a Day.     • Loperamide HCl (IMODIUM PO) Take  by mouth 2 (Two) Times a Day.     • losartan (COZAAR) 25 MG tablet Take 25 mg by mouth 2 (Two) Times a Day.     • METFORMIN HCL ER, OSM, PO Take 1,000 mg by mouth 2 (two) times a day.     • nicotine (NICODERM CQ) 21 MG/24HR patch Place 1 patch on the skin Daily. 30 patch 0   • nystatin (MYCOSTATIN) 349698 UNIT/ML suspension      • O2 (OXYGEN) Inhale 3 L/min As Needed.     • ondansetron (ZOFRAN) 8 MG tablet Take 1 tablet by mouth Every 8 (Eight) Hours As Needed for nausea or vomiting. 30 tablet 2   • potassium chloride (KLOR-CON) 20 MEQ CR tablet Take 40 mEq by mouth 2 (two) times a day.     • prochlorperazine (COMPAZINE) 10 MG tablet Take 1 tablet by mouth Every 6 (Six) Hours As Needed for nausea or vomiting. 30 tablet 0   • sucralfate (CARAFATE) 1 G tablet Take 1 tablet by mouth 4 (Four) Times a Day. Dissolve tablet in 30-45 ml of water and drink. 120 tablet 2   • tamsulosin (FLOMAX) 0.4 MG capsule 24 hr capsule Take 0.4 mg by mouth 2 (Two) Times a Day.     • Testosterone Enanthate 200 MG/ML solution Inject 1 mL into the shoulder,  thigh, or buttocks Take As Directed. Every 10 days     • UNKNOWN TO PATIENT Continuous. Pain pump Filled 11-17-16 with dilaudid per pt report     • ZETIA 10 MG tablet Take 10 mg by mouth Daily.       Current Facility-Administered Medications   Medication Dose Route Frequency Provider Last Rate Last Dose   • heparin injection 500 Units  5 mL Intravenous Q8H PRN Cody Alvarenga II, MD   500 Units at 11/24/16 1229     Facility-Administered Medications Ordered in Other Visits   Medication Dose Route Frequency Provider Last Rate Last Dose   • heparin flush (porcine) 100 UNIT/ML injection 500 Units  500 Units Intravenous PRN Cody Torres MD   500 Units at 02/06/17 1113   • sodium chloride 0.9 % flush 10 mL  10 mL Intravenous PRN Cody Torres MD   10 mL at 02/06/17 1112     Current Outpatient Prescriptions on File Prior to Visit   Medication Sig   • albuterol (PROVENTIL HFA;VENTOLIN HFA) 108 (90 BASE) MCG/ACT inhaler Inhale 2 puffs Every 4 (Four) Hours As Needed for wheezing.   • ALPRAZolam (XANAX) 0.5 MG tablet Take 1 tablet by mouth 2 (Two) Times a Day As Needed for Anxiety.   • Armodafinil 250 MG tablet Take 250 mg by mouth Daily.   • aspirin 81 MG EC tablet Take 81 mg by mouth Daily.   • budesonide-formoterol (SYMBICORT) 160-4.5 MCG/ACT inhaler Inhale 2 puffs 2 (Two) Times a Day.   • carvedilol (COREG) 6.25 MG tablet Take 6.25 mg by mouth 2 (two) times a day with meals.   • cholestyramine (QUESTRAN) 4 G packet Take 1 packet by mouth 3 (Three) Times a Day With Meals. 1 packet  TID PRN diarrhea   • citalopram (CeleXA) 20 MG tablet TAKE ONE TABLET BY MOUTH ONCE DAILY   • clonazePAM (KLONOPIN) 0.5 MG tablet Take 2 tablets by mouth At Night As Needed for Seizures for up to 14 doses.   • cyproheptadine (PERIACTIN) 4 MG tablet 1/2 tablet at bedtime for appetite and sleep   • diphenoxylate-atropine (LOMOTIL) 2.5-0.025 MG per tablet TAKE ONE TABLET BY MOUTH 4 TIMES DAILY AS NEEDED FOR DIARRHEA   • dronabinol (MARINOL) 5  MG capsule TAKE ONE CAPSULE BY MOUTH TWICE DAILY BEFORE  MEALS  FOR  WEIGHT  LOSS   • esomeprazole (nexIUM) 40 MG capsule Take 1 capsule by mouth Every Morning Before Breakfast.   • fluconazole (DIFLUCAN) 150 MG tablet    • furosemide (LASIX) 80 MG tablet Take 160 mg by mouth 2 (two) times a day.   • gabapentin (NEURONTIN) 600 MG tablet Take 600 mg by mouth 4 (Four) Times a Day. 2 tabs 4x/day   • HYDROmorphone (DILAUDID) 4 MG tablet Take 8 mg by mouth Every 3 (Three) Hours As Needed.   • ipratropium-albuterol (DUO-NEB) 0.5-2.5 mg/mL nebulizer Take 3 mL by nebulization 4 (Four) Times a Day.   • Loperamide HCl (IMODIUM PO) Take  by mouth 2 (Two) Times a Day.   • losartan (COZAAR) 25 MG tablet Take 25 mg by mouth 2 (Two) Times a Day.   • METFORMIN HCL ER, OSM, PO Take 1,000 mg by mouth 2 (two) times a day.   • nicotine (NICODERM CQ) 21 MG/24HR patch Place 1 patch on the skin Daily.   • nystatin (MYCOSTATIN) [id number] UNIT/ML suspension    • O2 (OXYGEN) Inhale 3 L/min As Needed.   • ondansetron (ZOFRAN) 8 MG tablet Take 1 tablet by mouth Every 8 (Eight) Hours As Needed for nausea or vomiting.   • potassium chloride (KLOR-CON) 20 MEQ CR tablet Take 40 mEq by mouth 2 (two) times a day.   • prochlorperazine (COMPAZINE) 10 MG tablet Take 1 tablet by mouth Every 6 (Six) Hours As Needed for nausea or vomiting.   • sucralfate (CARAFATE) 1 G tablet Take 1 tablet by mouth 4 (Four) Times a Day. Dissolve tablet in 30-45 ml of water and drink.   • tamsulosin (FLOMAX) 0.4 MG capsule 24 hr capsule Take 0.4 mg by mouth 2 (Two) Times a Day.   • Testosterone Enanthate 200 MG/ML solution Inject 1 mL into the shoulder, thigh, or buttocks Take As Directed. Every 10 days   • UNKNOWN TO PATIENT Continuous. Pain pump Filled 11-17-16 with dilaudid per pt report   • ZETIA 10 MG tablet Take 10 mg by mouth Daily.     Current Facility-Administered Medications on File Prior to Visit   Medication   • heparin flush (porcine) 100 UNIT/ML injection 500  "Units   • heparin injection 500 Units   • sodium chloride 0.9 % flush 10 mL     He is allergic to atorvastatin; simvastatin; baclofen; and lyrica [pregabalin]..    Review of Systems   Constitutional: Positive for activity change and fatigue.       Objective   Mental Status Exam  Appearance:  clean and casually dressed, appropriate  Attitude toward clinician:  cooperative and agreeable   Speech:    Rate:  regular rate and rhythm   Volume:  normal  Motor:  no abnormal movements present  Mood:  Fatigued, due to lack of armodafinil; \"as good as I can be\"  Affect:  euthymic  Thought Processes:  linear, logical, and goal directed  Thought Content:  normal  Suicidal Thoughts:  absent  Homicidal Thoughts:  absent  Perceptual Disturbance: no perceptual disturbance  Attention and Concentration:  good  Insight and Judgement:  good  Memory:  memory appears to be intact     Physical Exam   Constitutional: He is oriented to person, place, and time.   Musculoskeletal: He exhibits edema.   Neurological: He is alert and oriented to person, place, and time.   Skin: Skin is dry. There is erythema.   Psychiatric: He has a normal mood and affect. His behavior is normal. Judgment and thought content normal.   Station and gait observed to be normal    Lab Review:   No visits with results within 2 Month(s) from this visit.  Latest known visit with results is:    Infusion on 04/25/2017   Component Date Value   • Glucose 04/25/2017 105    • BUN 04/25/2017 6    • Creatinine 04/25/2017 0.70    • Sodium 04/25/2017 134    • Potassium 04/25/2017 3.6    • Chloride 04/25/2017 89*   • CO2 04/25/2017 36.9*   • Calcium 04/25/2017 9.0    • Total Protein 04/25/2017 7.6    • Albumin 04/25/2017 3.70    • ALT (SGPT) 04/25/2017 14    • AST (SGOT) 04/25/2017 17    • Alkaline Phosphatase 04/25/2017 103    • Total Bilirubin 04/25/2017 0.3    • eGFR Non  Amer 04/25/2017 115    • Globulin 04/25/2017 3.9*   • A/G Ratio 04/25/2017 0.9*   • BUN/Creatinine " Ratio 04/25/2017 8.6    • Anion Gap 04/25/2017 8.1    • WBC 04/25/2017 6.58    • RBC 04/25/2017 4.82    • Hemoglobin 04/25/2017 16.0    • Hematocrit 04/25/2017 48.0    • MCV 04/25/2017 99.6*   • MCH 04/25/2017 33.2*   • MCHC 04/25/2017 33.3    • RDW 04/25/2017 12.4    • RDW-SD 04/25/2017 45.4    • MPV 04/25/2017 9.0    • Platelets 04/25/2017 237    • Neutrophil % 04/25/2017 65.7    • Lymphocyte % 04/25/2017 19.6*   • Monocyte % 04/25/2017 9.4    • Eosinophil % 04/25/2017 3.6    • Basophil % 04/25/2017 1.4*   • Immature Grans % 04/25/2017 0.3    • Neutrophils, Absolute 04/25/2017 4.32    • Lymphocytes, Absolute 04/25/2017 1.29    • Monocytes, Absolute 04/25/2017 0.62    • Eosinophils, Absolute 04/25/2017 0.24    • Basophils, Absolute 04/25/2017 0.09    • Immature Grans, Absolute 04/25/2017 0.02    • nRBC 04/25/2017 0.0      Assessment/Plan   Case and assessment discussed with Dr. Dey. Pt symtoms of mood disorder stable. States he continues to take Celexa which is managed by a different provider. Continues to endorse sx of anxiety. Is currently taking xanax q hs. Feels mood is appropriate and concern is in regard to health status. Does not like to take xanax during day. Pt continues to endorse continued smoking of 1 ppd. Discussed support of pt quitting, including medication options. Pt states he has tried chantix and wellbutrin in the past. Has plans of working with hypnotist. Pt continues to discuss financial concerns. Both he and wife on disability. Endorses signiticant burden with medication refills. Coupon given for armodafinil. Plan to call in refills of xanax and armodafinil at this time, per telephone order per Dr. Dey. Additionally, discussed benefits of CPAP and importance of using with medications. Pt agrees to reinstate use.    Dx  Generalized anxiety disorder  Sleep Apnea  Fatigue related to medical condition

## 2017-07-03 NOTE — TELEPHONE ENCOUNTER
Armodafinil and Xanax refilled per case review and telephone order per Dr. Radha Dey. Called into [facility] on [facility], per pt request.

## 2017-07-31 ENCOUNTER — TELEPHONE (OUTPATIENT)
Dept: PSYCHIATRY | Facility: HOSPITAL | Age: 62
End: 2017-07-31

## 2017-07-31 NOTE — TELEPHONE ENCOUNTER
[facility]    Pt called regarding request for coupon to lessen cost of Nuvigil. Coupon emailed to [email or web address].

## 2017-08-07 RX ORDER — CITALOPRAM 20 MG/1
TABLET ORAL
Qty: 30 TABLET | Refills: 0 | Status: SHIPPED | OUTPATIENT
Start: 2017-08-07 | End: 2017-12-29 | Stop reason: SDUPTHER

## 2017-08-08 ENCOUNTER — OFFICE VISIT (OUTPATIENT)
Dept: INTERNAL MEDICINE | Facility: CLINIC | Age: 62
End: 2017-08-08

## 2017-08-08 VITALS
OXYGEN SATURATION: 91 % | BODY MASS INDEX: 32.69 KG/M2 | TEMPERATURE: 97.7 F | DIASTOLIC BLOOD PRESSURE: 62 MMHG | SYSTOLIC BLOOD PRESSURE: 98 MMHG | HEART RATE: 96 BPM | WEIGHT: 215 LBS

## 2017-08-08 DIAGNOSIS — I25.10 CHRONIC CORONARY ARTERY DISEASE: Primary | ICD-10-CM

## 2017-08-08 DIAGNOSIS — E78.2 MIXED HYPERLIPIDEMIA: ICD-10-CM

## 2017-08-08 DIAGNOSIS — I10 ESSENTIAL HYPERTENSION: ICD-10-CM

## 2017-08-08 DIAGNOSIS — C34.2 MALIGNANT NEOPLASM OF MIDDLE LOBE OF RIGHT LUNG (HCC): ICD-10-CM

## 2017-08-08 DIAGNOSIS — R63.4 WEIGHT LOSS, UNINTENTIONAL: ICD-10-CM

## 2017-08-08 PROCEDURE — 99214 OFFICE O/P EST MOD 30 MIN: CPT | Performed by: FAMILY MEDICINE

## 2017-08-08 RX ORDER — DRONABINOL 5 MG/1
5 CAPSULE ORAL
Qty: 60 CAPSULE | Refills: 5 | Status: SHIPPED | OUTPATIENT
Start: 2017-08-08 | End: 2017-12-03

## 2017-08-08 RX ORDER — OMEPRAZOLE 40 MG/1
CAPSULE, DELAYED RELEASE ORAL
COMMUNITY
Start: 2017-07-29 | End: 2017-08-08

## 2017-08-08 RX ORDER — TESTOSTERONE CYPIONATE 200 MG/ML
INJECTION, SOLUTION INTRAMUSCULAR
COMMUNITY
Start: 2017-07-26

## 2017-08-08 NOTE — PROGRESS NOTES
Subjective   Cody Simental is a 62 y.o. male.     Chief Complaint   Patient presents with   • Weight Loss   • Anxiety   • Lung Cancer         History of Present Illness   Patient is undergoing intensive treatment for small cell lung cancer mostly [facility] is now in remission bowel accounts.  He is on Marinol 5 mg twice a day to enhance and stem the tide of his weight loss otherwise is on Nuvigil from a psychiatrist Radha Dey.  He has some anxiety attacks as well and has an implanted pain pump.  Treatment of hypertension hyperlipidemia type II diabetic diabetes is reviewed.      The following portions of the patient's history were reviewed and updated as appropriate: allergies, current medications, past social history and problem list.    Review of Systems   Constitutional: Positive for fatigue.   HENT: Negative.    Eyes: Negative.    Respiratory: Negative.    Cardiovascular: Negative.    Gastrointestinal: Negative.    Endocrine: Negative.    Genitourinary: Negative.    Musculoskeletal: Positive for arthralgias, back pain, gait problem and myalgias.   Skin: Negative.    Allergic/Immunologic: Negative.    Hematological: Negative.    Psychiatric/Behavioral: The patient is nervous/anxious.        Objective   Vitals:    08/08/17 1354   BP: 98/62   Pulse: 96   Temp: 97.7 °F (36.5 °C)   SpO2: 91%     Physical Exam   Constitutional: He is oriented to person, place, and time. He appears well-developed and well-nourished.   HENT:   Head: Normocephalic and atraumatic.   Right Ear: Tympanic membrane and external ear normal.   Left Ear: Tympanic membrane and external ear normal.   Nose: Nose normal.   Mouth/Throat: Oropharynx is clear and moist.   Eyes: Conjunctivae and EOM are normal. Pupils are equal, round, and reactive to light.   Neck: Normal range of motion. Neck supple. No JVD present. No thyromegaly present.   Cardiovascular: Normal rate, regular rhythm, normal heart sounds and intact distal pulses.     Pulmonary/Chest: Effort normal. He has rhonchi in the right upper field, the right middle field, the right lower field, the left upper field, the left middle field and the left lower field.           Abdominal: Soft. Bowel sounds are normal.   Musculoskeletal: Normal range of motion.   Lymphadenopathy:     He has no cervical adenopathy.   Neurological: He is alert and oriented to person, place, and time. No cranial nerve deficit. Coordination normal.   Skin: Skin is warm and dry. No rash noted.   Psychiatric: He has a normal mood and affect. His behavior is normal. Judgment and thought content normal.   Vitals reviewed.      Assessment/Plan   Problem List Items Addressed This Visit        Cardiovascular and Mediastinum    Chronic coronary artery disease - Primary    Essential hypertension    Hyperlipidemia       Respiratory    Malignant neoplasm of middle lobe of right lung      Other Visit Diagnoses     Weight loss, unintentional          Continue follow-up with oncology.  Refill Marinol 5 mg twice a day.  We'll see him back in about 2 months.  Discussion about the cost of his medications.

## 2017-08-10 RX ORDER — ARMODAFINIL 250 MG/1
250 TABLET ORAL DAILY
Qty: 30 TABLET | Refills: 1 | Status: SHIPPED | OUTPATIENT
Start: 2017-08-10 | End: 2017-10-24

## 2017-08-16 ENCOUNTER — OFFICE VISIT (OUTPATIENT)
Dept: PSYCHIATRY | Facility: HOSPITAL | Age: 62
End: 2017-08-16

## 2017-08-16 DIAGNOSIS — F41.1 GENERALIZED ANXIETY DISORDER: ICD-10-CM

## 2017-08-16 DIAGNOSIS — F33.1 MAJOR DEPRESSIVE DISORDER, RECURRENT EPISODE, MODERATE (HCC): Primary | ICD-10-CM

## 2017-08-16 PROCEDURE — 99215 OFFICE O/P EST HI 40 MIN: CPT | Performed by: PSYCHIATRY & NEUROLOGY

## 2017-08-16 RX ORDER — ALPRAZOLAM 0.5 MG/1
0.5 TABLET ORAL 2 TIMES DAILY PRN
Qty: 60 TABLET | Refills: 1 | Status: SHIPPED | OUTPATIENT
Start: 2017-08-16

## 2017-08-16 NOTE — PROGRESS NOTES
Subjective   Patient ID: Cody Simental is a 62 y.o. male is here today for follow-up..     History of Present Illness  Pt presents to clinic for continued mgmt of mood and anxiety disorders concurrent with lung cancer dx. Pt reports cancer to continue in surveillance phase with next scans approaching. . Currently seeking second opinion at [facility]. States Current PHQ9=21 and GAD7=21. Pt endorses significant anxiety, contributing issues including financial stressors caused by medication needs. Pt endroses significant frustration with fact that Nuvigil gives him energy and drastically improves quality of life, however it costs him >$200/ month. On Nuvigil, pt able to conduct tasks around the house, enjoy time outside, and participate in necessary and pleasurable activities.    Pt with significant demoralization with difficulty adjusting to being on disability, inability to be productive, financial stressors significant.    The following portions of the patient's history were reviewed and updated as appropriate:   He  has a past medical history of Acid reflux; Alcohol abuse; Anxiety; Arthritis; Back pain; Cancer; COPD (chronic obstructive pulmonary disease); Coronary artery disease; Depression; Diabetes mellitus; Erectile dysfunction; Gastritis; History of heart attack (2014); Hypercholesteremia; Hypertension; Lung cancer; Lung cancer; Neuropathy; Sleep apnea; and Tobacco abuse.  He  does not have any pertinent problems on file.  He  has a past surgical history that includes Back surgery; Bronchoscopy (N/A, 10/27/2016); Colonoscopy (2014); Foot fracture surgery (Left); Coronary angioplasty with stent; Facial fracture surgery (1980'S); Pain Pump Insertion/Revision; and insj tunneled cvc w/o subq port/ age 7 yr/> (Left, 11/18/2016).  His family history includes Alcohol abuse in his other; Breast cancer (age of onset: 60) in his sister; Depression in his mother and sister; Heart attack in his father;  Kidney cancer (age of onset: 65) in his brother; Lung disease in his other; Stroke in his mother.  He  reports that he has been smoking Cigarettes.  He has a 10.00 pack-year smoking history. He has never used smokeless tobacco. He reports that he does not drink alcohol or use illicit drugs.  Current Outpatient Prescriptions   Medication Sig Dispense Refill   • albuterol (PROVENTIL HFA;VENTOLIN HFA) 108 (90 BASE) MCG/ACT inhaler Inhale 2 puffs Every 4 (Four) Hours As Needed for wheezing.     • ALPRAZolam (XANAX) 0.5 MG tablet Take 1 tablet by mouth 2 (Two) Times a Day As Needed for Anxiety. 60 tablet 1   • Armodafinil 250 MG tablet Take 250 mg by mouth Daily. 30 tablet 1   • aspirin 81 MG EC tablet Take 81 mg by mouth Daily.     • budesonide-formoterol (SYMBICORT) 160-4.5 MCG/ACT inhaler Inhale 2 puffs 2 (Two) Times a Day.     • carvedilol (COREG) 6.25 MG tablet Take 6.25 mg by mouth 2 (two) times a day with meals.     • cholestyramine (QUESTRAN) 4 G packet Take 1 packet by mouth 3 (Three) Times a Day With Meals. 1 packet  TID PRN diarrhea 60 packet 0   • citalopram (CeleXA) 20 MG tablet TAKE ONE TABLET BY MOUTH ONCE DAILY 30 tablet 0   • clonazePAM (KLONOPIN) 0.5 MG tablet Take 2 tablets by mouth At Night As Needed for Seizures for up to 14 doses. 14 tablet 0   • cyproheptadine (PERIACTIN) 4 MG tablet 1/2 tablet at bedtime for appetite and sleep 30 tablet 0   • diphenoxylate-atropine (LOMOTIL) 2.5-0.025 MG per tablet TAKE ONE TABLET BY MOUTH 4 TIMES DAILY AS NEEDED FOR DIARRHEA 60 tablet 1   • dronabinol (MARINOL) 5 MG capsule Take 1 capsule by mouth 2 (Two) Times a Day Before Meals. 60 capsule 5   • esomeprazole (nexIUM) 40 MG capsule Take 1 capsule by mouth Every Morning Before Breakfast. 90 capsule 3   • fluconazole (DIFLUCAN) 150 MG tablet      • furosemide (LASIX) 80 MG tablet Take 160 mg by mouth 2 (two) times a day.     • gabapentin (NEURONTIN) 600 MG tablet Take 600 mg by mouth 4 (Four) Times a Day. 2 tabs  4x/day     • HYDROmorphone (DILAUDID) 4 MG tablet Take 8 mg by mouth Every 3 (Three) Hours As Needed.     • ipratropium-albuterol (DUO-NEB) 0.5-2.5 mg/mL nebulizer Take 3 mL by nebulization 4 (Four) Times a Day.     • Loperamide HCl (IMODIUM PO) Take  by mouth 2 (Two) Times a Day.     • losartan (COZAAR) 25 MG tablet Take 25 mg by mouth 2 (Two) Times a Day.     • METFORMIN HCL ER, OSM, PO Take 1,000 mg by mouth 2 (two) times a day.     • nicotine (NICODERM CQ) 21 MG/24HR patch Place 1 patch on the skin Daily. 30 patch 0   • nystatin (MYCOSTATIN) 464403 UNIT/ML suspension      • O2 (OXYGEN) Inhale 3 L/min As Needed.     • ondansetron (ZOFRAN) 8 MG tablet Take 1 tablet by mouth Every 8 (Eight) Hours As Needed for nausea or vomiting. 30 tablet 2   • potassium chloride (KLOR-CON) 20 MEQ CR tablet Take 40 mEq by mouth 2 (two) times a day.     • prochlorperazine (COMPAZINE) 10 MG tablet Take 1 tablet by mouth Every 6 (Six) Hours As Needed for nausea or vomiting. 30 tablet 0   • sucralfate (CARAFATE) 1 G tablet Take 1 tablet by mouth 4 (Four) Times a Day. Dissolve tablet in 30-45 ml of water and drink. 120 tablet 2   • tamsulosin (FLOMAX) 0.4 MG capsule 24 hr capsule Take 0.4 mg by mouth 2 (Two) Times a Day.     • Testosterone Cypionate (DEPOTESTOTERONE CYPIONATE) 200 MG/ML injection      • UNKNOWN TO PATIENT Continuous. Pain pump Filled 11-17-16 with dilaudid per pt report     • ZETIA 10 MG tablet Take 10 mg by mouth Daily.       Current Facility-Administered Medications   Medication Dose Route Frequency Provider Last Rate Last Dose   • heparin injection 500 Units  5 mL Intravenous Q8H PRN Cody Alvarenga II, MD   500 Units at 11/24/16 1229     Facility-Administered Medications Ordered in Other Visits   Medication Dose Route Frequency Provider Last Rate Last Dose   • heparin flush (porcine) 100 UNIT/ML injection 500 Units  500 Units Intravenous PRN Cody Torres MD   500 Units at 02/06/17 1113   • sodium chloride 0.9 %  flush 10 mL  10 mL Intravenous PRN Cody Torres MD   10 mL at 02/06/17 1112     Current Outpatient Prescriptions on File Prior to Visit   Medication Sig   • albuterol (PROVENTIL HFA;VENTOLIN HFA) 108 (90 BASE) MCG/ACT inhaler Inhale 2 puffs Every 4 (Four) Hours As Needed for wheezing.   • ALPRAZolam (XANAX) 0.5 MG tablet Take 1 tablet by mouth 2 (Two) Times a Day As Needed for Anxiety.   • Armodafinil 250 MG tablet Take 250 mg by mouth Daily.   • aspirin 81 MG EC tablet Take 81 mg by mouth Daily.   • budesonide-formoterol (SYMBICORT) 160-4.5 MCG/ACT inhaler Inhale 2 puffs 2 (Two) Times a Day.   • carvedilol (COREG) 6.25 MG tablet Take 6.25 mg by mouth 2 (two) times a day with meals.   • cholestyramine (QUESTRAN) 4 G packet Take 1 packet by mouth 3 (Three) Times a Day With Meals. 1 packet  TID PRN diarrhea   • citalopram (CeleXA) 20 MG tablet TAKE ONE TABLET BY MOUTH ONCE DAILY   • clonazePAM (KLONOPIN) 0.5 MG tablet Take 2 tablets by mouth At Night As Needed for Seizures for up to 14 doses.   • cyproheptadine (PERIACTIN) 4 MG tablet 1/2 tablet at bedtime for appetite and sleep   • diphenoxylate-atropine (LOMOTIL) 2.5-0.025 MG per tablet TAKE ONE TABLET BY MOUTH 4 TIMES DAILY AS NEEDED FOR DIARRHEA   • dronabinol (MARINOL) 5 MG capsule Take 1 capsule by mouth 2 (Two) Times a Day Before Meals.   • esomeprazole (nexIUM) 40 MG capsule Take 1 capsule by mouth Every Morning Before Breakfast.   • fluconazole (DIFLUCAN) 150 MG tablet    • furosemide (LASIX) 80 MG tablet Take 160 mg by mouth 2 (two) times a day.   • gabapentin (NEURONTIN) 600 MG tablet Take 600 mg by mouth 4 (Four) Times a Day. 2 tabs 4x/day   • HYDROmorphone (DILAUDID) 4 MG tablet Take 8 mg by mouth Every 3 (Three) Hours As Needed.   • ipratropium-albuterol (DUO-NEB) 0.5-2.5 mg/mL nebulizer Take 3 mL by nebulization 4 (Four) Times a Day.   • Loperamide HCl (IMODIUM PO) Take  by mouth 2 (Two) Times a Day.   • losartan (COZAAR) 25 MG tablet Take 25 mg by  mouth 2 (Two) Times a Day.   • METFORMIN HCL ER, OSM, PO Take 1,000 mg by mouth 2 (two) times a day.   • nicotine (NICODERM CQ) 21 MG/24HR patch Place 1 patch on the skin Daily.   • nystatin (MYCOSTATIN) 966867 UNIT/ML suspension    • O2 (OXYGEN) Inhale 3 L/min As Needed.   • ondansetron (ZOFRAN) 8 MG tablet Take 1 tablet by mouth Every 8 (Eight) Hours As Needed for nausea or vomiting.   • potassium chloride (KLOR-CON) 20 MEQ CR tablet Take 40 mEq by mouth 2 (two) times a day.   • prochlorperazine (COMPAZINE) 10 MG tablet Take 1 tablet by mouth Every 6 (Six) Hours As Needed for nausea or vomiting.   • sucralfate (CARAFATE) 1 G tablet Take 1 tablet by mouth 4 (Four) Times a Day. Dissolve tablet in 30-45 ml of water and drink.   • tamsulosin (FLOMAX) 0.4 MG capsule 24 hr capsule Take 0.4 mg by mouth 2 (Two) Times a Day.   • Testosterone Cypionate (DEPOTESTOTERONE CYPIONATE) 200 MG/ML injection    • UNKNOWN TO PATIENT Continuous. Pain pump Filled 11-17-16 with dilaudid per pt report   • ZETIA 10 MG tablet Take 10 mg by mouth Daily.     Current Facility-Administered Medications on File Prior to Visit   Medication   • heparin flush (porcine) 100 UNIT/ML injection 500 Units   • heparin injection 500 Units   • sodium chloride 0.9 % flush 10 mL     He is allergic to atorvastatin; simvastatin; baclofen; and lyrica [pregabalin]..    Review of Systems    Objective   Mental Status Exam  Appearance:  clean and casually dressed, appropriate  Attitude toward clinician:  cooperative and agreeable   Speech:    Rate:  regular rate and rhythm   Volume:  soft   Motor:  no abnormal movements present  Mood:  anxious  Affect:  dysphoric, anxious and blunted  Thought Processes:  tangential and poverty of thought  Thought Content:  normal  Suicidal Thoughts:  absent  Homicidal Thoughts:  absent  Perceptual Disturbance: no perceptual disturbance  Attention and Concentration:  poor  Insight and Judgement:  fair  Memory:  deficit in  short-term  Physical Exam   Constitutional: He is oriented to person, place, and time. He appears well-developed and well-nourished. He appears distressed.   Neurological: He is alert and oriented to person, place, and time.   Skin: Skin is warm and dry.   Psychiatric: His mood appears anxious. His affect is blunt. His affect is not angry, not labile and not inappropriate. His speech is not rapid and/or pressured, not delayed, not tangential and not slurred. He is not agitated, not aggressive, not hyperactive, not slowed, not withdrawn and not combative. Thought content is not paranoid and not delusional. Cognition and memory are impaired. He exhibits a depressed mood. He expresses no homicidal and no suicidal ideation. He expresses no suicidal plans and no homicidal plans. He is communicative. He exhibits abnormal recent memory. He exhibits normal remote memory. He is inattentive.   Gait stable  Lab Review:   No visits with results within 2 Month(s) from this visit.  Latest known visit with results is:    Infusion on 04/25/2017   Component Date Value   • Glucose 04/25/2017 105    • BUN 04/25/2017 6    • Creatinine 04/25/2017 0.70    • Sodium 04/25/2017 134    • Potassium 04/25/2017 3.6    • Chloride 04/25/2017 89*   • CO2 04/25/2017 36.9*   • Calcium 04/25/2017 9.0    • Total Protein 04/25/2017 7.6    • Albumin 04/25/2017 3.70    • ALT (SGPT) 04/25/2017 14    • AST (SGOT) 04/25/2017 17    • Alkaline Phosphatase 04/25/2017 103    • Total Bilirubin 04/25/2017 0.3    • eGFR Non  Amer 04/25/2017 115    • Globulin 04/25/2017 3.9*   • A/G Ratio 04/25/2017 0.9*   • BUN/Creatinine Ratio 04/25/2017 8.6    • Anion Gap 04/25/2017 8.1    • WBC 04/25/2017 6.58    • RBC 04/25/2017 4.82    • Hemoglobin 04/25/2017 16.0    • Hematocrit 04/25/2017 48.0    • MCV 04/25/2017 99.6*   • MCH 04/25/2017 33.2*   • MCHC 04/25/2017 33.3    • RDW 04/25/2017 12.4    • RDW-SD 04/25/2017 45.4    • MPV 04/25/2017 9.0    • Platelets  04/25/2017 237    • Neutrophil % 04/25/2017 65.7    • Lymphocyte % 04/25/2017 19.6*   • Monocyte % 04/25/2017 9.4    • Eosinophil % 04/25/2017 3.6    • Basophil % 04/25/2017 1.4*   • Immature Grans % 04/25/2017 0.3    • Neutrophils, Absolute 04/25/2017 4.32    • Lymphocytes, Absolute 04/25/2017 1.29    • Monocytes, Absolute 04/25/2017 0.62    • Eosinophils, Absolute 04/25/2017 0.24    • Basophils, Absolute 04/25/2017 0.09    • Immature Grans, Absolute 04/25/2017 0.02    • nRBC 04/25/2017 0.0        Assessment/ Plan  Medications reviewed. Benefit of Nuvigil assessed including benefits of energy, motivation, memory, and concentration. Other options discussed including lose dose stimulant for cognitive sx. Pt states he was on ritalin in the past which was helpful; does not remember why this was discontinued. Pt does have history of MI and sees cardiologist. Plan to collaborate with cardiologist to weigh risk and benefit. Pt is currently on celexa for mood sx. Pt states his wife believes this is working. However, PHQ9 and GAD7 scores are severely elevated. Plan to have pt come back to clinic with wife. Pt instructed to bring in list of all previous psychotropic medications.   Pt additionally endorses mouth pain and need for dental work; discussed ability to receive care at  dental school for discounted tx. Emphasized importance of decreasing potential for infection, given CA history.    Total face to facetime spent 45 minutes  Counseling issues: grief and loss over disability, coping with stress of chronic life threatening illness, managing financial stressors and being overwhelmed and demoralized, focused on finding meaningful structure  Medication review and decision making, reviewed cardiac status and MI 2 years ago complicates use of stimulant as cheaper substitute for nuvigil. Pt states has taken stimulant in past uncertain of why this was discontinued, reviewed celexa with suggestion of pt doubling dose. He is a  poor historian plan to have wife join next session, bring name of cardiologist and list of past medication trials, complex pt with multiple physical and mental health issues.20 minutes

## 2017-09-12 ENCOUNTER — TELEPHONE (OUTPATIENT)
Dept: PSYCHIATRY | Facility: HOSPITAL | Age: 62
End: 2017-09-12

## 2017-09-12 NOTE — TELEPHONE ENCOUNTER
[facility]    Pt call returned regarding drowsiness with change in medication regimen. Both numbers attempted. Message left on mobile number.

## 2017-09-13 ENCOUNTER — TELEPHONE (OUTPATIENT)
Dept: PSYCHIATRY | Facility: HOSPITAL | Age: 62
End: 2017-09-13

## 2017-09-13 NOTE — TELEPHONE ENCOUNTER
[facility]    Returned call to pt regarding lack of perceived benefit with Ritalin. Pt reports some increase in energy for appx 2 hours following dose. Is currently taking when he wakes up at 6 am and again at 6 pm. Discussed adjusting dose to am when ready to begin day (appx 8 am, per pt) and again at noon. Discussed expectations of medication to increase energy for appx 3 hours following dose and encouraged pt to adjust day so high energy needs are met during these times. Pt agreeable to schedule changes. Continued support offered.

## 2017-09-20 RX ORDER — ESOMEPRAZOLE MAGNESIUM 40 MG/1
40 CAPSULE, DELAYED RELEASE ORAL
Qty: 90 CAPSULE | Refills: 3 | Status: SHIPPED | OUTPATIENT
Start: 2017-09-20 | End: 2018-01-11 | Stop reason: SDUPTHER

## 2017-09-22 ENCOUNTER — TELEPHONE (OUTPATIENT)
Dept: PSYCHIATRY | Facility: HOSPITAL | Age: 62
End: 2017-09-22

## 2017-09-22 RX ORDER — METHYLPHENIDATE HYDROCHLORIDE 10 MG/1
10 TABLET ORAL 2 TIMES DAILY
Qty: 60 TABLET | Refills: 0 | Status: SHIPPED | OUTPATIENT
Start: 2017-09-22 | End: 2017-12-03

## 2017-09-22 NOTE — TELEPHONE ENCOUNTER
[facility]    Spoke with pt wife regarding Dr. Dey decision to refill ritalin 10 mg bid. Wife reports pt having headaches. Discussed liklihood this is due to increased dose. Advise to return to ritalin 5 mg bid.

## 2017-10-10 ENCOUNTER — TELEPHONE (OUTPATIENT)
Dept: INTERNAL MEDICINE | Facility: CLINIC | Age: 62
End: 2017-10-10

## 2017-10-10 RX ORDER — FLUTICASONE PROPIONATE 50 MCG
2 SPRAY, SUSPENSION (ML) NASAL DAILY
Qty: 3 BOTTLE | Refills: 3 | Status: SHIPPED | OUTPATIENT
Start: 2017-10-10

## 2017-10-10 NOTE — TELEPHONE ENCOUNTER
The pt's wife called and requested you RX Flonase for him, so he will stop using hers   Ok to send in RX  Directions?

## 2017-10-23 ENCOUNTER — TELEPHONE (OUTPATIENT)
Dept: PSYCHIATRY | Facility: HOSPITAL | Age: 62
End: 2017-10-23

## 2017-10-23 NOTE — TELEPHONE ENCOUNTER
[facility]    Pt called clinic regarding decreased energy and need for addl medication. Pt has appt scheduled for tomorrow and needs will be addressed at that time.

## 2017-10-24 ENCOUNTER — OFFICE VISIT (OUTPATIENT)
Dept: PSYCHIATRY | Facility: HOSPITAL | Age: 62
End: 2017-10-24

## 2017-10-24 DIAGNOSIS — R53.0 NEOPLASTIC MALIGNANT RELATED FATIGUE: ICD-10-CM

## 2017-10-24 DIAGNOSIS — F41.1 GENERALIZED ANXIETY DISORDER: ICD-10-CM

## 2017-10-24 DIAGNOSIS — F33.1 MAJOR DEPRESSIVE DISORDER, RECURRENT EPISODE, MODERATE (HCC): Primary | ICD-10-CM

## 2017-10-24 PROCEDURE — G0463 HOSPITAL OUTPT CLINIC VISIT: HCPCS | Performed by: NURSE PRACTITIONER

## 2017-10-24 PROCEDURE — 99215 OFFICE O/P EST HI 40 MIN: CPT | Performed by: NURSE PRACTITIONER

## 2017-10-24 NOTE — PROGRESS NOTES
Subjective  Patient ID: Cody Simental is a 62 y.o. male who presents [facility] for follow-up.   Pt presents to clinic for continued monitoring of depression, anxiety, and fatigue concurrent with metastatic small cell lung cancer. Discussed pt cancer treatment, as he does not currently have any follow up appointments through [facility] office. Last visit to [facility] in April, 2017. Per message with Dr. Alvarenga, it is believed pt transitioned care to  following referral for second opinion. Pt states he is being followed by [facility] and is scheduled to have another CT next week. Pt states his disease is in remission, and no workup is needed at this time. Pt appears distraught. With further questioning, pt denies confidently understanding what is going on with dx. Comments needing to call [facility] to arrange for follow up with Dr. Alvarenga. Current PHQ9=17 and GAD7=20. Pt endorses feeling down, significant fatigue, feelings of failure, difficulty concentrating, moving slowly, high anxiety/ worry, difficulty relaxing, restlessness, irritability, and fears of something awful happening. Pt reports sleep to be appx 4-5 hours nightly but naps frequently during day. Reports sleep easy to initiate but broken due to high anxiety and nocturia. Wakes feeling non restored, reports significant fatigue. Pt reports falling asleep multiple times during day, including drowsiness in session. Appetite is appropriate without noted weight loss or gain. Motivation and energy are low. Pt denies interest in pleasurable activities and reports low quality of life. Pt with hx of substance use although denies current alcohol or illicit drug use. Pt reports continuing to smoke 5-6 cigarettes daily. Is attempting to cut down, although has failed in the past with assistance of wellbutrin, chantix, and nicotine replacement therapy. States pain is controlled per pain mgmt, and he also reports receiving xanax from pain  mgmt, which he takes before bed. Since last visit, pt has been taking ritalin for fatigue mgmt. In late September, we discussed decrease and then cessation due to headaches and jitteryness. Pt states he resumed at 10 mg bid and is out now, and while it wasn't as good as Nuvigil, it did help. Pt denies SE at this time. Depression evaluated. Pt reports hx with several psychotropic medications including wellbutrin, cymbalta, and seroquel, all of which he reports negative reactions to including hallucinations. Pt feels ritalin may have had some positive impact on mood. Memory evaluated, as pt reports change from baseline. MMSE completed with score 29/30. Pt endorses financial concern as primary stressor.     Pt brings the following questions to the appointment from wife:  Why so tired with an without meds?  Ritalin doesn't work as well as Nuvigil; is there another med?  Why can't sleep more than a couple hours when exhausted?    PHQ9 Total Score: 17  FACUNDO 7 Total Score: 20    The following portions of the patient's history were reviewed and updated as appropriate:   He  has a past medical history of Acid reflux; Alcohol abuse; Anxiety; Arthritis; Back pain; Cancer; COPD (chronic obstructive pulmonary disease); Coronary artery disease; Depression; Diabetes mellitus; Erectile dysfunction; Gastritis; History of heart attack (2014); Hypercholesteremia; Hypertension; Lung cancer; Lung cancer; Neuropathy; Sleep apnea; and Tobacco abuse.  He  does not have any pertinent problems on file.  He  has a past surgical history that includes Back surgery; Bronchoscopy (N/A, 10/27/2016); Colonoscopy (2014); Foot fracture surgery (Left); Coronary angioplasty with stent; Facial fracture surgery (1980'S); Pain Pump Insertion/Revision; and insj tunneled cvc w/o subq port/ age 7 yr/> (Left, 11/18/2016).  His family history includes Alcohol abuse in his other; Breast cancer (age of onset: 60) in his sister; Depression in his mother and  sister; Heart attack in his father; Kidney cancer (age of onset: 65) in his brother; Lung disease in his other; Stroke in his mother.  He  reports that he has been smoking Cigarettes.  He has a 10.00 pack-year smoking history. He has never used smokeless tobacco. He reports that he does not drink alcohol or use illicit drugs.  Current Outpatient Prescriptions   Medication Sig Dispense Refill   • albuterol (PROVENTIL HFA;VENTOLIN HFA) 108 (90 BASE) MCG/ACT inhaler Inhale 2 puffs Every 4 (Four) Hours As Needed for wheezing.     • ALPRAZolam (XANAX) 0.5 MG tablet Take 1 tablet by mouth 2 (Two) Times a Day As Needed for Anxiety. 60 tablet 1   • Armodafinil 250 MG tablet Take 250 mg by mouth Daily. 30 tablet 1   • aspirin 81 MG EC tablet Take 81 mg by mouth Daily.     • budesonide-formoterol (SYMBICORT) 160-4.5 MCG/ACT inhaler Inhale 2 puffs 2 (Two) Times a Day.     • carvedilol (COREG) 6.25 MG tablet Take 6.25 mg by mouth 2 (two) times a day with meals.     • cholestyramine (QUESTRAN) 4 G packet Take 1 packet by mouth 3 (Three) Times a Day With Meals. 1 packet  TID PRN diarrhea 60 packet 0   • citalopram (CeleXA) 20 MG tablet TAKE ONE TABLET BY MOUTH ONCE DAILY 30 tablet 0   • citalopram (CELEXA) 40 MG tablet Take 1 tablet by mouth Daily. 30 tablet 2   • cyproheptadine (PERIACTIN) 4 MG tablet 1/2 tablet at bedtime for appetite and sleep 30 tablet 0   • diphenoxylate-atropine (LOMOTIL) 2.5-0.025 MG per tablet TAKE ONE TABLET BY MOUTH 4 TIMES DAILY AS NEEDED FOR DIARRHEA 60 tablet 1   • dronabinol (MARINOL) 5 MG capsule Take 1 capsule by mouth 2 (Two) Times a Day Before Meals. 60 capsule 5   • esomeprazole (nexIUM) 40 MG capsule Take 1 capsule by mouth Every Morning Before Breakfast. 90 capsule 3   • fluconazole (DIFLUCAN) 150 MG tablet      • fluticasone (FLONASE) 50 MCG/ACT nasal spray 2 sprays into each nostril Daily. 3 bottle 3   • furosemide (LASIX) 80 MG tablet Take 160 mg by mouth 2 (two) times a day.     •  gabapentin (NEURONTIN) 600 MG tablet Take 600 mg by mouth 4 (Four) Times a Day. 2 tabs 4x/day     • HYDROmorphone (DILAUDID) 4 MG tablet Take 8 mg by mouth Every 3 (Three) Hours As Needed.     • ipratropium-albuterol (DUO-NEB) 0.5-2.5 mg/mL nebulizer Take 3 mL by nebulization 4 (Four) Times a Day.     • Loperamide HCl (IMODIUM PO) Take  by mouth 2 (Two) Times a Day.     • losartan (COZAAR) 25 MG tablet Take 25 mg by mouth 2 (Two) Times a Day.     • METFORMIN HCL ER, OSM, PO Take 1,000 mg by mouth 2 (two) times a day.     • methylphenidate (RITALIN) 10 MG tablet Take 1 tablet by mouth 2 (Two) Times a Day. 60 tablet 0   • nicotine (NICODERM CQ) 21 MG/24HR patch Place 1 patch on the skin Daily. 30 patch 0   • nystatin (MYCOSTATIN) 436336 UNIT/ML suspension      • O2 (OXYGEN) Inhale 3 L/min As Needed.     • ondansetron (ZOFRAN) 8 MG tablet Take 1 tablet by mouth Every 8 (Eight) Hours As Needed for nausea or vomiting. 30 tablet 2   • potassium chloride (KLOR-CON) 20 MEQ CR tablet Take 40 mEq by mouth 2 (two) times a day.     • prochlorperazine (COMPAZINE) 10 MG tablet Take 1 tablet by mouth Every 6 (Six) Hours As Needed for nausea or vomiting. 30 tablet 0   • sucralfate (CARAFATE) 1 G tablet Take 1 tablet by mouth 4 (Four) Times a Day. Dissolve tablet in 30-45 ml of water and drink. 120 tablet 2   • tamsulosin (FLOMAX) 0.4 MG capsule 24 hr capsule Take 0.4 mg by mouth 2 (Two) Times a Day.     • Testosterone Cypionate (DEPOTESTOTERONE CYPIONATE) 200 MG/ML injection      • UNKNOWN TO PATIENT Continuous. Pain pump Filled 11-17-16 with dilaudid per pt report     • ZETIA 10 MG tablet Take 10 mg by mouth Daily.       Current Facility-Administered Medications   Medication Dose Route Frequency Provider Last Rate Last Dose   • heparin injection 500 Units  5 mL Intravenous Q8H PRN Cody Alvarenga II, MD   500 Units at 11/24/16 1229     Facility-Administered Medications Ordered in Other Visits   Medication Dose Route Frequency  Provider Last Rate Last Dose   • heparin flush (porcine) 100 UNIT/ML injection 500 Units  500 Units Intravenous PRN Cody Torres MD   500 Units at 02/06/17 1113   • sodium chloride 0.9 % flush 10 mL  10 mL Intravenous PRN Cody Torres MD   10 mL at 02/06/17 1112     Current Outpatient Prescriptions on File Prior to Visit   Medication Sig   • albuterol (PROVENTIL HFA;VENTOLIN HFA) 108 (90 BASE) MCG/ACT inhaler Inhale 2 puffs Every 4 (Four) Hours As Needed for wheezing.   • ALPRAZolam (XANAX) 0.5 MG tablet Take 1 tablet by mouth 2 (Two) Times a Day As Needed for Anxiety.   • Armodafinil 250 MG tablet Take 250 mg by mouth Daily.   • aspirin 81 MG EC tablet Take 81 mg by mouth Daily.   • budesonide-formoterol (SYMBICORT) 160-4.5 MCG/ACT inhaler Inhale 2 puffs 2 (Two) Times a Day.   • carvedilol (COREG) 6.25 MG tablet Take 6.25 mg by mouth 2 (two) times a day with meals.   • cholestyramine (QUESTRAN) 4 G packet Take 1 packet by mouth 3 (Three) Times a Day With Meals. 1 packet  TID PRN diarrhea   • citalopram (CeleXA) 20 MG tablet TAKE ONE TABLET BY MOUTH ONCE DAILY   • citalopram (CELEXA) 40 MG tablet Take 1 tablet by mouth Daily.   • cyproheptadine (PERIACTIN) 4 MG tablet 1/2 tablet at bedtime for appetite and sleep   • diphenoxylate-atropine (LOMOTIL) 2.5-0.025 MG per tablet TAKE ONE TABLET BY MOUTH 4 TIMES DAILY AS NEEDED FOR DIARRHEA   • dronabinol (MARINOL) 5 MG capsule Take 1 capsule by mouth 2 (Two) Times a Day Before Meals.   • esomeprazole (nexIUM) 40 MG capsule Take 1 capsule by mouth Every Morning Before Breakfast.   • fluconazole (DIFLUCAN) 150 MG tablet    • fluticasone (FLONASE) 50 MCG/ACT nasal spray 2 sprays into each nostril Daily.   • furosemide (LASIX) 80 MG tablet Take 160 mg by mouth 2 (two) times a day.   • gabapentin (NEURONTIN) 600 MG tablet Take 600 mg by mouth 4 (Four) Times a Day. 2 tabs 4x/day   • HYDROmorphone (DILAUDID) 4 MG tablet Take 8 mg by mouth Every 3 (Three) Hours As Needed.    • ipratropium-albuterol (DUO-NEB) 0.5-2.5 mg/mL nebulizer Take 3 mL by nebulization 4 (Four) Times a Day.   • Loperamide HCl (IMODIUM PO) Take  by mouth 2 (Two) Times a Day.   • losartan (COZAAR) 25 MG tablet Take 25 mg by mouth 2 (Two) Times a Day.   • METFORMIN HCL ER, OSM, PO Take 1,000 mg by mouth 2 (two) times a day.   • methylphenidate (RITALIN) 10 MG tablet Take 1 tablet by mouth 2 (Two) Times a Day.   • nicotine (NICODERM CQ) 21 MG/24HR patch Place 1 patch on the skin Daily.   • nystatin (MYCOSTATIN) 009057 UNIT/ML suspension    • O2 (OXYGEN) Inhale 3 L/min As Needed.   • ondansetron (ZOFRAN) 8 MG tablet Take 1 tablet by mouth Every 8 (Eight) Hours As Needed for nausea or vomiting.   • potassium chloride (KLOR-CON) 20 MEQ CR tablet Take 40 mEq by mouth 2 (two) times a day.   • prochlorperazine (COMPAZINE) 10 MG tablet Take 1 tablet by mouth Every 6 (Six) Hours As Needed for nausea or vomiting.   • sucralfate (CARAFATE) 1 G tablet Take 1 tablet by mouth 4 (Four) Times a Day. Dissolve tablet in 30-45 ml of water and drink.   • tamsulosin (FLOMAX) 0.4 MG capsule 24 hr capsule Take 0.4 mg by mouth 2 (Two) Times a Day.   • Testosterone Cypionate (DEPOTESTOTERONE CYPIONATE) 200 MG/ML injection    • UNKNOWN TO PATIENT Continuous. Pain pump Filled 11-17-16 with dilaudid per pt report   • ZETIA 10 MG tablet Take 10 mg by mouth Daily.     Current Facility-Administered Medications on File Prior to Visit   Medication   • heparin flush (porcine) 100 UNIT/ML injection 500 Units   • heparin injection 500 Units   • sodium chloride 0.9 % flush 10 mL     He is allergic to atorvastatin; simvastatin; baclofen; and lyrica [pregabalin].    Review of Systems    Objective   Mental Status Exam  Appearance:  disheveled, unkempt  Attitude toward clinician:  cooperative and agreeable   Speech:    Rate:  regular rate and rhythm   Volume:  normal  Motor:  no abnormal movements present  Mood:  Tired, concerned, demoralized  Affect:   dysphoric  Thought Processes:  linear, logical, and goal directed  Thought Content:  normal  Suicidal Thoughts:  absent  Homicidal Thoughts:  absent  Perceptual Disturbance: no perceptual disturbance  Attention and Concentration:  fair  Insight and Judgement:  fair  Memory:  memory appears to be intact    Physical Exam  Station and gait observed to be WNL.    Lab Review:   No visits with results within 2 Month(s) from this visit.  Latest known visit with results is:    Infusion on 04/25/2017   Component Date Value   • Glucose 04/25/2017 105    • BUN 04/25/2017 6    • Creatinine 04/25/2017 0.70    • Sodium 04/25/2017 134    • Potassium 04/25/2017 3.6    • Chloride 04/25/2017 89*   • CO2 04/25/2017 36.9*   • Calcium 04/25/2017 9.0    • Total Protein 04/25/2017 7.6    • Albumin 04/25/2017 3.70    • ALT (SGPT) 04/25/2017 14    • AST (SGOT) 04/25/2017 17    • Alkaline Phosphatase 04/25/2017 103    • Total Bilirubin 04/25/2017 0.3    • eGFR Non  Amer 04/25/2017 115    • Globulin 04/25/2017 3.9*   • A/G Ratio 04/25/2017 0.9*   • BUN/Creatinine Ratio 04/25/2017 8.6    • Anion Gap 04/25/2017 8.1    • WBC 04/25/2017 6.58    • RBC 04/25/2017 4.82    • Hemoglobin 04/25/2017 16.0    • Hematocrit 04/25/2017 48.0    • MCV 04/25/2017 99.6*   • MCH 04/25/2017 33.2*   • MCHC 04/25/2017 33.3    • RDW 04/25/2017 12.4    • RDW-SD 04/25/2017 45.4    • MPV 04/25/2017 9.0    • Platelets 04/25/2017 237    • Neutrophil % 04/25/2017 65.7    • Lymphocyte % 04/25/2017 19.6*   • Monocyte % 04/25/2017 9.4    • Eosinophil % 04/25/2017 3.6    • Basophil % 04/25/2017 1.4*   • Immature Grans % 04/25/2017 0.3    • Neutrophils, Absolute 04/25/2017 4.32    • Lymphocytes, Absolute 04/25/2017 1.29    • Monocytes, Absolute 04/25/2017 0.62    • Eosinophils, Absolute 04/25/2017 0.24    • Basophils, Absolute 04/25/2017 0.09    • Immature Grans, Absolute 04/25/2017 0.02    • nRBC 04/25/2017 0.0        There are no diagnoses linked to this  encounter.    Plan of Care  Pt with complex medical and social stressors. I am unable to see notation from UL or pain mgmt, and thus do not know what current plan is for patient cancer and sx care. Pt encouraged to reach out to either  med onc or Dr. Alvarenga to better understand dx and treatment. Pt desires to complete CT scan next week through  and plans to call [facility] to reinstate care with Dr. Alvarenga. Discussed importance of continued maintenance given nature of disease. Pt agrees. Additionally discussed pt question of fatigue and potential contributing factors including disease process, current medication regimen, or depression. Medications Reviewed. Pt reports having pain pump and is taking dilaudid PRN, xanax q hs, and gabapentin as prescribed per pain mgmt. Pt continues Celexa, which he has been on for quite some time. Additionally reports taking Ritalin 10 mg bid, although is out of medication early. Pt does report taking more of these on some days, in an attempt to find extra energy. Pt strongly desires medication to assist with energy. Feels Nuvigil was most beneficial, but unable to afford. Plan to cross taper celexa to effexor due to significance of residual depression and anxiety sx. Pt informed stimulant use could likely increase anxiety, and thus would not be appropriate without first managing anxiety. Pt instructed to take 1/2 tablet of celexa for 1 week concurrent with initiation of 37.5 mg Effexor XR. Beginning day 8, if pt tolerating medication well, we will plan to decrease Celexa to 20 mg every other day with 75 mg (2 tabs) Effexor. Plan to follow up with patient in 2 weeks in clinic.    Total face to face time spent 55 minutes. 45 minutes spent in supportive counseling surrounding issues of nonpharmacologic techniques for anxiety mgmt, exercise for energy and wellness, sleep hygiene, and importance of continued oncologic care.

## 2017-10-26 RX ORDER — VENLAFAXINE HYDROCHLORIDE 37.5 MG/1
37.5 CAPSULE, EXTENDED RELEASE ORAL DAILY
Qty: 30 CAPSULE | Refills: 1 | Status: SHIPPED | OUTPATIENT
Start: 2017-10-26 | End: 2017-12-03

## 2017-11-09 ENCOUNTER — TELEPHONE (OUTPATIENT)
Dept: PSYCHIATRY | Facility: HOSPITAL | Age: 62
End: 2017-11-09

## 2017-11-09 NOTE — TELEPHONE ENCOUNTER
[facility]    Call received from pt desiring to discuss medication plan prior to appt today. Pt reports inability to tolerate cross taper from celexa to effexor xr. States he tried fro a few days but felt he was 'spacing out' and feeling as though he would be dizzy if he stood up. Does not desire to continue. Has reinstated celexa at 40 mg daily. Discussed importance of better anxiety control before continued stimulant for wakefulness could be utilized. Pt does not desire additional medication changes. Additionally, pt has decided not to continue care with [facility]. Pt states he has an appt with Dr. Orozco, PCP, today at which point he will discuss his depression and anxiety sx. Additionally plans to discuss this with med onc at . Pt aware abrupt cessation of care with this clinic is not required due to discontinuation with [facility] However, he desires to discontinue care through clinic at this time. I have offered to reach out to both of these providers. Pt does not desire this at this time but has contact information to clinic and recognizes I am available to assist with care coordination.

## 2017-12-03 ENCOUNTER — APPOINTMENT (OUTPATIENT)
Dept: GENERAL RADIOLOGY | Facility: HOSPITAL | Age: 62
End: 2017-12-03

## 2017-12-03 ENCOUNTER — HOSPITAL ENCOUNTER (INPATIENT)
Facility: HOSPITAL | Age: 62
LOS: 5 days | Discharge: HOME HEALTH | End: 2017-12-08
Attending: EMERGENCY MEDICINE | Admitting: INTERNAL MEDICINE

## 2017-12-03 DIAGNOSIS — J96.21 ACUTE ON CHRONIC RESPIRATORY FAILURE WITH HYPOXIA (HCC): ICD-10-CM

## 2017-12-03 DIAGNOSIS — J18.9 PNEUMONIA OF RIGHT LOWER LOBE DUE TO INFECTIOUS ORGANISM: Primary | ICD-10-CM

## 2017-12-03 LAB
ALBUMIN SERPL-MCNC: 3.5 G/DL (ref 3.5–5.2)
ALBUMIN/GLOB SERPL: 0.8 G/DL
ALP SERPL-CCNC: 92 U/L (ref 39–117)
ALT SERPL W P-5'-P-CCNC: 10 U/L (ref 1–41)
ANION GAP SERPL CALCULATED.3IONS-SCNC: 7.2 MMOL/L
AST SERPL-CCNC: 13 U/L (ref 1–40)
BASOPHILS # BLD AUTO: 0.04 10*3/MM3 (ref 0–0.2)
BASOPHILS NFR BLD AUTO: 0.6 % (ref 0–1.5)
BILIRUB SERPL-MCNC: 0.4 MG/DL (ref 0.1–1.2)
BUN BLD-MCNC: 14 MG/DL (ref 8–23)
BUN/CREAT SERPL: 17.5 (ref 7–25)
CALCIUM SPEC-SCNC: 8.9 MG/DL (ref 8.6–10.5)
CHLORIDE SERPL-SCNC: 94 MMOL/L (ref 98–107)
CO2 SERPL-SCNC: 38.8 MMOL/L (ref 22–29)
CREAT BLD-MCNC: 0.8 MG/DL (ref 0.76–1.27)
D-LACTATE SERPL-SCNC: 1.4 MMOL/L (ref 0.5–2)
DEPRECATED RDW RBC AUTO: 55.7 FL (ref 37–54)
EOSINOPHIL # BLD AUTO: 0.22 10*3/MM3 (ref 0–0.7)
EOSINOPHIL NFR BLD AUTO: 3.1 % (ref 0.3–6.2)
ERYTHROCYTE [DISTWIDTH] IN BLOOD BY AUTOMATED COUNT: 15.8 % (ref 11.5–14.5)
GFR SERPL CREATININE-BSD FRML MDRD: 99 ML/MIN/1.73
GLOBULIN UR ELPH-MCNC: 4.5 GM/DL
GLUCOSE BLD-MCNC: 92 MG/DL (ref 65–99)
HCT VFR BLD AUTO: 54.4 % (ref 40.4–52.2)
HGB BLD-MCNC: 16.4 G/DL (ref 13.7–17.6)
HOLD SPECIMEN: NORMAL
HOLD SPECIMEN: NORMAL
IMM GRANULOCYTES # BLD: 0.02 10*3/MM3 (ref 0–0.03)
IMM GRANULOCYTES NFR BLD: 0.3 % (ref 0–0.5)
LYMPHOCYTES # BLD AUTO: 0.93 10*3/MM3 (ref 0.9–4.8)
LYMPHOCYTES NFR BLD AUTO: 13.1 % (ref 19.6–45.3)
MCH RBC QN AUTO: 29.3 PG (ref 27–32.7)
MCHC RBC AUTO-ENTMCNC: 30.1 G/DL (ref 32.6–36.4)
MCV RBC AUTO: 97.1 FL (ref 79.8–96.2)
MONOCYTES # BLD AUTO: 0.91 10*3/MM3 (ref 0.2–1.2)
MONOCYTES NFR BLD AUTO: 12.9 % (ref 5–12)
NEUTROPHILS # BLD AUTO: 4.96 10*3/MM3 (ref 1.9–8.1)
NEUTROPHILS NFR BLD AUTO: 70 % (ref 42.7–76)
PLATELET # BLD AUTO: 175 10*3/MM3 (ref 140–500)
PMV BLD AUTO: 9.7 FL (ref 6–12)
POTASSIUM BLD-SCNC: 4.4 MMOL/L (ref 3.5–5.2)
PROT SERPL-MCNC: 8 G/DL (ref 6–8.5)
RBC # BLD AUTO: 5.6 10*6/MM3 (ref 4.6–6)
SODIUM BLD-SCNC: 140 MMOL/L (ref 136–145)
TROPONIN T SERPL-MCNC: <0.01 NG/ML (ref 0–0.03)
WBC NRBC COR # BLD: 7.08 10*3/MM3 (ref 4.5–10.7)
WHOLE BLOOD HOLD SPECIMEN: NORMAL
WHOLE BLOOD HOLD SPECIMEN: NORMAL

## 2017-12-03 PROCEDURE — 80053 COMPREHEN METABOLIC PANEL: CPT | Performed by: EMERGENCY MEDICINE

## 2017-12-03 PROCEDURE — 94799 UNLISTED PULMONARY SVC/PX: CPT

## 2017-12-03 PROCEDURE — 87040 BLOOD CULTURE FOR BACTERIA: CPT | Performed by: EMERGENCY MEDICINE

## 2017-12-03 PROCEDURE — 93005 ELECTROCARDIOGRAM TRACING: CPT | Performed by: EMERGENCY MEDICINE

## 2017-12-03 PROCEDURE — 71010 HC CHEST PA OR AP: CPT

## 2017-12-03 PROCEDURE — 99284 EMERGENCY DEPT VISIT MOD MDM: CPT

## 2017-12-03 PROCEDURE — 93010 ELECTROCARDIOGRAM REPORT: CPT | Performed by: INTERNAL MEDICINE

## 2017-12-03 PROCEDURE — 84484 ASSAY OF TROPONIN QUANT: CPT | Performed by: EMERGENCY MEDICINE

## 2017-12-03 PROCEDURE — 85025 COMPLETE CBC W/AUTO DIFF WBC: CPT | Performed by: EMERGENCY MEDICINE

## 2017-12-03 PROCEDURE — 25010000002 METHYLPREDNISOLONE PER 125 MG: Performed by: EMERGENCY MEDICINE

## 2017-12-03 PROCEDURE — 83605 ASSAY OF LACTIC ACID: CPT | Performed by: EMERGENCY MEDICINE

## 2017-12-03 PROCEDURE — 25010000002 CEFTRIAXONE PER 250 MG: Performed by: EMERGENCY MEDICINE

## 2017-12-03 RX ORDER — AMMONIUM LACTATE 12 G/100G
1 CREAM TOPICAL AS NEEDED
COMMUNITY
Start: 2017-09-15

## 2017-12-03 RX ORDER — CEFTRIAXONE SODIUM 1 G/50ML
1 INJECTION, SOLUTION INTRAVENOUS ONCE
Status: COMPLETED | OUTPATIENT
Start: 2017-12-03 | End: 2017-12-03

## 2017-12-03 RX ORDER — CEFTRIAXONE SODIUM 1 G/50ML
1 INJECTION, SOLUTION INTRAVENOUS EVERY 24 HOURS
Status: DISCONTINUED | OUTPATIENT
Start: 2017-12-04 | End: 2017-12-07

## 2017-12-03 RX ORDER — METHYLPREDNISOLONE SODIUM SUCCINATE 40 MG/ML
40 INJECTION, POWDER, LYOPHILIZED, FOR SOLUTION INTRAMUSCULAR; INTRAVENOUS EVERY 8 HOURS
Status: DISCONTINUED | OUTPATIENT
Start: 2017-12-04 | End: 2017-12-07

## 2017-12-03 RX ORDER — NICOTINE 21 MG/24HR
1 PATCH, TRANSDERMAL 24 HOURS TRANSDERMAL EVERY 24 HOURS
Status: DISCONTINUED | OUTPATIENT
Start: 2017-12-04 | End: 2017-12-08 | Stop reason: HOSPADM

## 2017-12-03 RX ORDER — IPRATROPIUM BROMIDE AND ALBUTEROL SULFATE 2.5; .5 MG/3ML; MG/3ML
3 SOLUTION RESPIRATORY (INHALATION)
Status: DISCONTINUED | OUTPATIENT
Start: 2017-12-04 | End: 2017-12-08 | Stop reason: HOSPADM

## 2017-12-03 RX ORDER — IPRATROPIUM BROMIDE AND ALBUTEROL SULFATE 2.5; .5 MG/3ML; MG/3ML
3 SOLUTION RESPIRATORY (INHALATION) ONCE
Status: COMPLETED | OUTPATIENT
Start: 2017-12-03 | End: 2017-12-03

## 2017-12-03 RX ORDER — LOSARTAN POTASSIUM 25 MG/1
25 TABLET ORAL DAILY
COMMUNITY
Start: 2017-12-01

## 2017-12-03 RX ORDER — HYDROMORPHONE HYDROCHLORIDE 4 MG/1
8 TABLET ORAL
Status: DISCONTINUED | OUTPATIENT
Start: 2017-12-03 | End: 2017-12-08 | Stop reason: HOSPADM

## 2017-12-03 RX ORDER — FUROSEMIDE 80 MG
80 TABLET ORAL 2 TIMES DAILY
COMMUNITY
Start: 2017-11-06

## 2017-12-03 RX ORDER — METHYLPREDNISOLONE SODIUM SUCCINATE 125 MG/2ML
125 INJECTION, POWDER, LYOPHILIZED, FOR SOLUTION INTRAMUSCULAR; INTRAVENOUS ONCE
Status: COMPLETED | OUTPATIENT
Start: 2017-12-03 | End: 2017-12-03

## 2017-12-03 RX ORDER — CARVEDILOL 6.25 MG/1
6.25 TABLET ORAL 2 TIMES DAILY
COMMUNITY
Start: 2017-10-02

## 2017-12-03 RX ORDER — ALBUTEROL SULFATE 2.5 MG/3ML
2.5 SOLUTION RESPIRATORY (INHALATION)
Status: COMPLETED | OUTPATIENT
Start: 2017-12-03 | End: 2017-12-03

## 2017-12-03 RX ADMIN — ALBUTEROL SULFATE 2.5 MG: 2.5 SOLUTION RESPIRATORY (INHALATION) at 21:45

## 2017-12-03 RX ADMIN — ALBUTEROL SULFATE 2.5 MG: 2.5 SOLUTION RESPIRATORY (INHALATION) at 20:49

## 2017-12-03 RX ADMIN — CEFTRIAXONE SODIUM 1 G: 1 INJECTION, SOLUTION INTRAVENOUS at 21:59

## 2017-12-03 RX ADMIN — SODIUM CHLORIDE 1000 ML: 9 INJECTION, SOLUTION INTRAVENOUS at 20:47

## 2017-12-03 RX ADMIN — DOXYCYCLINE 100 MG: 100 INJECTION, POWDER, LYOPHILIZED, FOR SOLUTION INTRAVENOUS at 22:35

## 2017-12-03 RX ADMIN — METHYLPREDNISOLONE SODIUM SUCCINATE 125 MG: 125 INJECTION, POWDER, FOR SOLUTION INTRAMUSCULAR; INTRAVENOUS at 20:15

## 2017-12-03 RX ADMIN — IPRATROPIUM BROMIDE AND ALBUTEROL SULFATE 3 ML: .5; 3 SOLUTION RESPIRATORY (INHALATION) at 20:43

## 2017-12-04 ENCOUNTER — APPOINTMENT (OUTPATIENT)
Dept: CT IMAGING | Facility: HOSPITAL | Age: 62
End: 2017-12-04

## 2017-12-04 PROCEDURE — 94799 UNLISTED PULMONARY SVC/PX: CPT

## 2017-12-04 PROCEDURE — 87205 SMEAR GRAM STAIN: CPT | Performed by: INTERNAL MEDICINE

## 2017-12-04 PROCEDURE — 71250 CT THORAX DX C-: CPT

## 2017-12-04 PROCEDURE — 25010000002 CEFTRIAXONE PER 250 MG: Performed by: INTERNAL MEDICINE

## 2017-12-04 PROCEDURE — 87070 CULTURE OTHR SPECIMN AEROBIC: CPT | Performed by: INTERNAL MEDICINE

## 2017-12-04 PROCEDURE — 87081 CULTURE SCREEN ONLY: CPT | Performed by: INTERNAL MEDICINE

## 2017-12-04 PROCEDURE — 25010000002 METHYLPREDNISOLONE PER 40 MG: Performed by: INTERNAL MEDICINE

## 2017-12-04 PROCEDURE — 25010000002 HEPARIN FLUSH (PORCINE) 100 UNIT/ML SOLUTION: Performed by: INTERNAL MEDICINE

## 2017-12-04 RX ORDER — ALPRAZOLAM 0.5 MG/1
0.5 TABLET ORAL 2 TIMES DAILY PRN
Status: DISCONTINUED | OUTPATIENT
Start: 2017-12-04 | End: 2017-12-08 | Stop reason: HOSPADM

## 2017-12-04 RX ORDER — SODIUM CHLORIDE 0.9 % (FLUSH) 0.9 %
10 SYRINGE (ML) INJECTION AS NEEDED
Status: DISCONTINUED | OUTPATIENT
Start: 2017-12-04 | End: 2017-12-08 | Stop reason: HOSPADM

## 2017-12-04 RX ORDER — GABAPENTIN 300 MG/1
300 CAPSULE ORAL 3 TIMES DAILY
Status: DISCONTINUED | OUTPATIENT
Start: 2017-12-04 | End: 2017-12-08 | Stop reason: HOSPADM

## 2017-12-04 RX ORDER — SODIUM CHLORIDE 0.9 % (FLUSH) 0.9 %
10 SYRINGE (ML) INJECTION EVERY 12 HOURS SCHEDULED
Status: DISCONTINUED | OUTPATIENT
Start: 2017-12-04 | End: 2017-12-08 | Stop reason: HOSPADM

## 2017-12-04 RX ADMIN — IPRATROPIUM BROMIDE AND ALBUTEROL SULFATE 3 ML: .5; 3 SOLUTION RESPIRATORY (INHALATION) at 00:14

## 2017-12-04 RX ADMIN — HYDROMORPHONE HYDROCHLORIDE 8 MG: 2 TABLET ORAL at 21:11

## 2017-12-04 RX ADMIN — HYDROMORPHONE HYDROCHLORIDE 8 MG: 2 TABLET ORAL at 15:31

## 2017-12-04 RX ADMIN — GABAPENTIN 300 MG: 300 CAPSULE ORAL at 21:12

## 2017-12-04 RX ADMIN — CEFTRIAXONE SODIUM 1 G: 1 INJECTION, SOLUTION INTRAVENOUS at 21:14

## 2017-12-04 RX ADMIN — IPRATROPIUM BROMIDE AND ALBUTEROL SULFATE 3 ML: .5; 3 SOLUTION RESPIRATORY (INHALATION) at 12:15

## 2017-12-04 RX ADMIN — NICOTINE 1 PATCH: 14 PATCH, EXTENDED RELEASE TRANSDERMAL at 08:45

## 2017-12-04 RX ADMIN — HYDROMORPHONE HYDROCHLORIDE 8 MG: 2 TABLET ORAL at 00:39

## 2017-12-04 RX ADMIN — METHYLPREDNISOLONE SODIUM SUCCINATE 40 MG: 40 INJECTION, POWDER, FOR SOLUTION INTRAMUSCULAR; INTRAVENOUS at 05:28

## 2017-12-04 RX ADMIN — Medication 500 UNITS: at 21:12

## 2017-12-04 RX ADMIN — METHYLPREDNISOLONE SODIUM SUCCINATE 40 MG: 40 INJECTION, POWDER, FOR SOLUTION INTRAMUSCULAR; INTRAVENOUS at 11:38

## 2017-12-04 RX ADMIN — DOXYCYCLINE 100 MG: 100 INJECTION, POWDER, LYOPHILIZED, FOR SOLUTION INTRAVENOUS at 22:07

## 2017-12-04 RX ADMIN — ALPRAZOLAM 0.5 MG: 0.5 TABLET ORAL at 22:07

## 2017-12-04 RX ADMIN — IPRATROPIUM BROMIDE AND ALBUTEROL SULFATE 3 ML: .5; 3 SOLUTION RESPIRATORY (INHALATION) at 20:44

## 2017-12-04 RX ADMIN — METHYLPREDNISOLONE SODIUM SUCCINATE 40 MG: 40 INJECTION, POWDER, FOR SOLUTION INTRAMUSCULAR; INTRAVENOUS at 20:36

## 2017-12-04 RX ADMIN — IPRATROPIUM BROMIDE AND ALBUTEROL SULFATE 3 ML: .5; 3 SOLUTION RESPIRATORY (INHALATION) at 08:34

## 2017-12-04 RX ADMIN — DOXYCYCLINE 100 MG: 100 INJECTION, POWDER, LYOPHILIZED, FOR SOLUTION INTRAVENOUS at 09:56
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
## 2017-12-04 NOTE — PLAN OF CARE
Problem: Patient Care Overview (Adult)  Goal: Plan of Care Review  Outcome: Ongoing (interventions implemented as appropriate)    12/04/17 0414   Coping/Psychosocial Response Interventions   Plan Of Care Reviewed With patient   Patient Care Overview   Progress no change   Outcome Evaluation   Outcome Summary/Follow up Plan Patient admitted with Pneumonia. VSS but requiring 6 L high flow O2 with sats 87-92%. He and his wife report baseline Sp02 in the upper 80s on 3-4L at home. Dilaudid pain pump implanted. Medicated for pain PRN. NSR on the monitor. Dr. Gaines called for admission orders, he will address home medications. Will continue to monitor closely.         Problem: Fall Risk (Adult)  Goal: Identify Related Risk Factors and Signs and Symptoms  Outcome: Outcome(s) achieved Date Met:  12/04/17  Goal: Absence of Falls  Outcome: Ongoing (interventions implemented as appropriate)    Problem: Activity Intolerance (Adult)  Goal: Identify Related Risk Factors and Signs and Symptoms  Outcome: Outcome(s) achieved Date Met:  12/04/17  Goal: Activity Tolerance  Outcome: Ongoing (interventions implemented as appropriate)  Goal: Effective Energy Conservation Techniques  Outcome: Ongoing (interventions implemented as appropriate)    Problem: Pneumonia (Adult)  Goal: Signs and Symptoms of Listed Potential Problems Will be Absent or Manageable (Pneumonia)  Outcome: Ongoing (interventions implemented as appropriate)    Problem: Pain, Chronic (Adult)  Goal: Identify Related Risk Factors and Signs and Symptoms  Outcome: Outcome(s) achieved Date Met:  12/04/17  Goal: Acceptable Pain Control/Comfort Level  Outcome: Ongoing (interventions implemented as appropriate)    Problem: Respiratory Insufficiency (Adult)  Goal: Identify Related Risk Factors and Signs and Symptoms  Outcome: Outcome(s) achieved Date Met:  12/04/17  Goal: Acid/Base Balance  Outcome: Ongoing (interventions implemented as appropriate)  Goal: Effective  Ventilation  Outcome: Ongoing (interventions implemented as appropriate)

## 2017-12-04 NOTE — PLAN OF CARE
Problem: Patient Care Overview (Adult)  Goal: Plan of Care Review  Outcome: Ongoing (interventions implemented as appropriate)    12/04/17 5559   Coping/Psychosocial Response Interventions   Plan Of Care Reviewed With patient   Patient Care Overview   Progress progress toward functional goals as expected   Outcome Evaluation   Outcome Summary/Follow up Plan pt resting in bed. complains of chronic back pain. has implanted diaudid pump but requires prn po dilaudid also. vss. pt on 4L o2. nsr on monitor. pt gets sob on erertion.         Problem: Fall Risk (Adult)  Goal: Absence of Falls  Outcome: Ongoing (interventions implemented as appropriate)    Problem: Activity Intolerance (Adult)  Goal: Activity Tolerance  Outcome: Ongoing (interventions implemented as appropriate)    Problem: Pneumonia (Adult)  Goal: Signs and Symptoms of Listed Potential Problems Will be Absent or Manageable (Pneumonia)  Outcome: Ongoing (interventions implemented as appropriate)    Problem: Pain, Chronic (Adult)  Goal: Acceptable Pain Control/Comfort Level  Outcome: Ongoing (interventions implemented as appropriate)    Problem: Respiratory Insufficiency (Adult)  Goal: Acid/Base Balance  Outcome: Ongoing (interventions implemented as appropriate)

## 2017-12-04 NOTE — ED TRIAGE NOTES
"Per EMS- Pt has HX of COPD, is a current every day smoker but wears O2 @ 5 LPM NC normally at home and \"is very un compliant.\" EMS states- \"Pt \"fell asleep\"and his NC moved from nostrils. Pt's wife called EMS because pt was confused upon awakening. EMS reports initial O2 Sat reading of 70% RA. Per EMS they were on scene for 40 mins total with pt's Sats reading  92% after applying O2 at 5 LPM NC ( pt AAOx4 at that time.) Resp even and unlabored. Skin PWD and intact. Pt is AAOx3 and in NAD. Pt denies CP and is placed in triage bay #5 and placed on O2.  "

## 2017-12-05 PROCEDURE — 25010000002 ENOXAPARIN PER 10 MG: Performed by: INTERNAL MEDICINE

## 2017-12-05 PROCEDURE — 25010000002 METHYLPREDNISOLONE PER 40 MG: Performed by: INTERNAL MEDICINE

## 2017-12-05 PROCEDURE — 94799 UNLISTED PULMONARY SVC/PX: CPT

## 2017-12-05 PROCEDURE — 94660 CPAP INITIATION&MGMT: CPT

## 2017-12-05 PROCEDURE — 25010000002 CEFTRIAXONE PER 250 MG: Performed by: INTERNAL MEDICINE

## 2017-12-05 PROCEDURE — 25010000002 HEPARIN FLUSH (PORCINE) 100 UNIT/ML SOLUTION: Performed by: INTERNAL MEDICINE

## 2017-12-05 RX ORDER — PANTOPRAZOLE SODIUM 40 MG/1
40 TABLET, DELAYED RELEASE ORAL
Status: DISCONTINUED | OUTPATIENT
Start: 2017-12-06 | End: 2017-12-08 | Stop reason: HOSPADM

## 2017-12-05 RX ORDER — CARVEDILOL 6.25 MG/1
6.25 TABLET ORAL DAILY
Status: DISCONTINUED | OUTPATIENT
Start: 2017-12-05 | End: 2017-12-08 | Stop reason: HOSPADM

## 2017-12-05 RX ORDER — ACETAMINOPHEN 325 MG/1
650 TABLET ORAL EVERY 6 HOURS PRN
Status: DISCONTINUED | OUTPATIENT
Start: 2017-12-05 | End: 2017-12-08 | Stop reason: HOSPADM

## 2017-12-05 RX ORDER — FUROSEMIDE 80 MG
80 TABLET ORAL 2 TIMES DAILY
Status: DISCONTINUED | OUTPATIENT
Start: 2017-12-05 | End: 2017-12-08 | Stop reason: HOSPADM

## 2017-12-05 RX ORDER — TAMSULOSIN HYDROCHLORIDE 0.4 MG/1
0.4 CAPSULE ORAL DAILY
Status: DISCONTINUED | OUTPATIENT
Start: 2017-12-05 | End: 2017-12-08 | Stop reason: HOSPADM

## 2017-12-05 RX ORDER — LOSARTAN POTASSIUM 25 MG/1
25 TABLET ORAL
Status: DISCONTINUED | OUTPATIENT
Start: 2017-12-05 | End: 2017-12-08 | Stop reason: HOSPADM

## 2017-12-05 RX ORDER — CITALOPRAM 20 MG/1
20 TABLET ORAL DAILY
Status: DISCONTINUED | OUTPATIENT
Start: 2017-12-05 | End: 2017-12-08 | Stop reason: HOSPADM

## 2017-12-05 RX ORDER — POTASSIUM CHLORIDE 750 MG/1
20 CAPSULE, EXTENDED RELEASE ORAL DAILY
Status: DISCONTINUED | OUTPATIENT
Start: 2017-12-05 | End: 2017-12-08 | Stop reason: HOSPADM

## 2017-12-05 RX ADMIN — IPRATROPIUM BROMIDE AND ALBUTEROL SULFATE 3 ML: .5; 3 SOLUTION RESPIRATORY (INHALATION) at 07:50

## 2017-12-05 RX ADMIN — METHYLPREDNISOLONE SODIUM SUCCINATE 40 MG: 40 INJECTION, POWDER, FOR SOLUTION INTRAMUSCULAR; INTRAVENOUS at 03:49

## 2017-12-05 RX ADMIN — CARVEDILOL 6.25 MG: 6.25 TABLET, FILM COATED ORAL at 15:13

## 2017-12-05 RX ADMIN — METHYLPREDNISOLONE SODIUM SUCCINATE 40 MG: 40 INJECTION, POWDER, FOR SOLUTION INTRAMUSCULAR; INTRAVENOUS at 20:04

## 2017-12-05 RX ADMIN — HYDROMORPHONE HYDROCHLORIDE 8 MG: 2 TABLET ORAL at 09:19

## 2017-12-05 RX ADMIN — METHYLPREDNISOLONE SODIUM SUCCINATE 40 MG: 40 INJECTION, POWDER, FOR SOLUTION INTRAMUSCULAR; INTRAVENOUS at 12:26

## 2017-12-05 RX ADMIN — Medication 500 UNITS: at 09:34

## 2017-12-05 RX ADMIN — ALPRAZOLAM 0.5 MG: 0.5 TABLET ORAL at 21:19

## 2017-12-05 RX ADMIN — IPRATROPIUM BROMIDE AND ALBUTEROL SULFATE 3 ML: .5; 3 SOLUTION RESPIRATORY (INHALATION) at 12:47

## 2017-12-05 RX ADMIN — GABAPENTIN 300 MG: 300 CAPSULE ORAL at 20:04

## 2017-12-05 RX ADMIN — POTASSIUM CHLORIDE 20 MEQ: 750 CAPSULE, EXTENDED RELEASE ORAL at 15:14

## 2017-12-05 RX ADMIN — Medication 10 ML: at 09:35

## 2017-12-05 RX ADMIN — TAMSULOSIN HYDROCHLORIDE 0.4 MG: 0.4 CAPSULE ORAL at 15:13

## 2017-12-05 RX ADMIN — GABAPENTIN 300 MG: 300 CAPSULE ORAL at 16:13

## 2017-12-05 RX ADMIN — NICOTINE 1 PATCH: 14 PATCH, EXTENDED RELEASE TRANSDERMAL at 09:04

## 2017-12-05 RX ADMIN — ENOXAPARIN SODIUM 40 MG: 40 INJECTION SUBCUTANEOUS at 22:20

## 2017-12-05 RX ADMIN — DOXYCYCLINE 100 MG: 100 INJECTION, POWDER, LYOPHILIZED, FOR SOLUTION INTRAVENOUS at 22:19

## 2017-12-05 RX ADMIN — ALPRAZOLAM 0.5 MG: 0.5 TABLET ORAL at 09:19

## 2017-12-05 RX ADMIN — Medication 10 ML: at 20:04

## 2017-12-05 RX ADMIN — FUROSEMIDE 80 MG: 80 TABLET ORAL at 18:44

## 2017-12-05 RX ADMIN — GABAPENTIN 300 MG: 300 CAPSULE ORAL at 08:59

## 2017-12-05 RX ADMIN — DOXYCYCLINE 100 MG: 100 INJECTION, POWDER, LYOPHILIZED, FOR SOLUTION INTRAVENOUS at 10:17

## 2017-12-05 RX ADMIN — CITALOPRAM 20 MG: 20 TABLET, FILM COATED ORAL at 15:14

## 2017-12-05 RX ADMIN — HYDROMORPHONE HYDROCHLORIDE 8 MG: 2 TABLET ORAL at 21:19

## 2017-12-05 RX ADMIN — CEFTRIAXONE SODIUM 1 G: 1 INJECTION, SOLUTION INTRAVENOUS at 21:19

## 2017-12-05 RX ADMIN — IPRATROPIUM BROMIDE AND ALBUTEROL SULFATE 3 ML: .5; 3 SOLUTION RESPIRATORY (INHALATION) at 20:09

## 2017-12-05 RX ADMIN — Medication 500 UNITS: at 20:04

## 2017-12-05 RX ADMIN — LOSARTAN POTASSIUM 25 MG: 25 TABLET, FILM COATED ORAL at 15:14

## 2017-12-05 NOTE — PROGRESS NOTES
Discharge Planning Assessment  [facility]     Patient Name: Cody Simental  MRN: [id number]  Today's Date: 12/5/2017    Admit Date: 12/3/2017          Discharge Needs Assessment       12/05/17 0935    Living Environment    Lives With spouse    Living Arrangements mobile home    Home Accessibility no concerns;stairs to enter home    Type of Financial/Environmental Concern none    Transportation Available car;family or friend will provide    Living Environment Comment Patient says he lives in a mobile home with his wife Claudette. He will have a few steps to enter and does not feel that will be a problem.     Living Environment    Provides Primary Care For no one    Primary Care Provided By spouse/significant other    Quality Of Family Relationships supportive;helpful;involved    Able to Return to Prior Living Arrangements yes    Discharge Needs Assessment    Concerns To Be Addressed discharge planning concerns;home safety concerns;basic needs concerns    Concerns Comments Patient says he has used HH before but that has been years ago and is not sure of the provider. He says he has not been to rehab. He gives me permission to call his wife Claudette to get her opinion of what she feels he will need. Called to Claudette/wife [phone] and she feels he will need HH with a nurse and PT. She requests a referral to [facility]. Called referral to Rosa Stock/[facility] [phone] and left [facility] for referral.     Outpatient/Agency/Support Group Needs homecare agency (specify level of care);other (see comments)    Community Agency Name(S) [facility] [place] for home O2    Anticipated Changes Related to Illness none    Equipment Currently Used at Home cane, straight;oxygen;nebulizer;bipap/ cpap   Patient says he has home O2, neb machine and CPAP from [employer].    Equipment Needed After Discharge none    Discharge Facility/Level Of Care Needs home with home health    Current Discharge Risk chronically ill;physical impairment    Discharge Disposition  still a patient    Discharge Contact Information if Applicable Claudette Simental wife [phone]    Discharge Planning Comments Plans home with [facility] and wife's assist.             Discharge Plan       12/05/17 0939    Case Management/Social Work Plan    Plan Plans home with [facility] and wife's assist.     Patient/Family In Agreement With Plan yes    Additional Comments Confirmed face sheet correct. IMM 12/3/17.         Discharge Placement     Facility/Agency Request Status Selected? Address Phone Number Fax Number    [facility] Pending - Request Sent     [address] [place] [id number] [phone]                Demographic Summary       12/05/17 0965    Referral Information    Admission Type inpatient    Arrived From admitted as an inpatient;home or self-care    Referral Source admission list    Reason For Consult discharge planning    Record Reviewed clinical discipline documentation;history and physical;medical record    Contact Information    Permission Granted to Share Information With ;family/designee    Comments Wife Claudette    Primary Care Physician Information    Name Dr. Melecio Orozco            Functional Status       12/05/17 0935    Functional Status Current    Ambulation 2-->assistive person    Transferring 2-->assistive person    Toileting 2-->assistive person    Bathing 2-->assistive person    Dressing 2-->assistive person    Eating 0-->independent    Communication 0-->understands/communicates without difficulty    Swallowing (if score 2 or more for any item, consult Rehab Services) 0-->swallows foods/liquids without difficulty    Change in Functional Status Since Onset of Current Illness/Injury yes    Functional Status Prior    Ambulation 1-->assistive equipment    Transferring 1-->assistive equipment    Toileting 1-->assistive equipment    Bathing 0-->independent    Dressing 0-->independent    Eating 0-->independent    Communication 0-->understands/communicates  without difficulty    Swallowing 0-->swallows foods/liquids without difficulty    Activity Tolerance    Usual Activity Tolerance moderate    Current Activity Tolerance fair            Psychosocial     None            Abuse/Neglect     None            Legal     None            Substance Abuse     None            Patient Forms     None          Felisa Williamson RN  Continued Stay Note  [facility]     Patient Name: Cody Simental  MRN: [id number]  Today's Date: 12/5/2017    Admit Date: 12/3/2017          Discharge Plan       12/05/17 0945    Case Management/Social Work Plan    Plan Plans home with [facility] and wife's assist.     Patient/Family In Agreement With Plan yes    Additional Comments Confirmed face sheet correct. IMM 12/3/17.               Discharge Codes     None            Felisa Williamson RN

## 2017-12-05 NOTE — NURSING NOTE
Pt c/o pain and anxiety. Rated pain as a 9 and described it as generalized. States from multiple back surgeries. Medicated with dilaudid and xanax as ordered with pain decreasing to a 4. States he feels much better at that level. Right forearm with old healing cut, 2inches long with scab present. Area towards distal end of scar open with scant amount of tan drainage, cleansed with saline and Mepelex dressing applied. Left top of foot with old scar, with dried yellowish scab in center, MADINA.

## 2017-12-05 NOTE — PLAN OF CARE
Problem: Patient Care Overview (Adult)  Goal: Plan of Care Review  Outcome: Ongoing (interventions implemented as appropriate)    12/05/17 0334   Coping/Psychosocial Response Interventions   Plan Of Care Reviewed With patient   Patient Care Overview   Progress improving   Outcome Evaluation   Outcome Summary/Follow up Plan Pt on dilaudid pump, has not c/o any breakthrough pain needing PO dilaudid. Pt on 5 L high flow nasal canula. Lungs sound wheezy on expiration. VS are stable. Will be given solumedrol @ 0400. Will continue to monitor.       Goal: Adult Individualization and Mutuality  Outcome: Ongoing (interventions implemented as appropriate)  Goal: Discharge Needs Assessment  Outcome: Ongoing (interventions implemented as appropriate)    Problem: Fall Risk (Adult)  Goal: Absence of Falls  Outcome: Ongoing (interventions implemented as appropriate)    Problem: Activity Intolerance (Adult)  Goal: Activity Tolerance  Outcome: Ongoing (interventions implemented as appropriate)  Goal: Effective Energy Conservation Techniques  Outcome: Ongoing (interventions implemented as appropriate)    Problem: Pneumonia (Adult)  Goal: Signs and Symptoms of Listed Potential Problems Will be Absent or Manageable (Pneumonia)  Outcome: Ongoing (interventions implemented as appropriate)    Problem: Pain, Chronic (Adult)  Goal: Acceptable Pain Control/Comfort Level  Outcome: Ongoing (interventions implemented as appropriate)    Problem: Respiratory Insufficiency (Adult)  Goal: Acid/Base Balance  Outcome: Ongoing (interventions implemented as appropriate)  Goal: Effective Ventilation  Outcome: Ongoing (interventions implemented as appropriate)

## 2017-12-05 NOTE — DISCHARGE PLACEMENT REQUEST
"Tacho Frey (62 y.o. Male)     Date of Birth Social Security Number Address Home Phone MRN    1955  [address] [place] [address] [phone] [id number]    Spiritism Marital Status          Sabianist        Admission Date Admission Type Admitting Provider Attending Provider Department, Room/Bed    12/3/17 Emergency Jairon Gaines MD Sayied, Tausif, MD [address], 662/1    Discharge Date Discharge Disposition Discharge Destination                      Attending Provider: Jairon Gaines MD     Allergies:  Atorvastatin, Simvastatin, Baclofen, Lyrica [Pregabalin]    Isolation:  None   Infection:  None   Code Status:  Prior    Ht:  180.3 cm (71\")   Wt:  95.8 kg (211 lb 1.6 oz)    Admission Cmt:  None   Principal Problem:  None                Active Insurance as of 12/3/2017     Primary Coverage     Payor Plan Insurance Group Employer/Plan Group    MEDICARE MEDICARE A & B      Payor Plan Address Payor Plan Phone Number Effective From Effective To    [address] [phone] 4/1/2010     [place] [address]       Subscriber Name Subscriber Birth Date Member ID       TACHO FREY 1955 [id number]                 Emergency Contacts      (Rel.) Home Phone Work Phone Mobile Phone    FreyClaudette nogueira (Spouse) [phone] -- [phone]              "

## 2017-12-06 LAB
BACTERIA SPEC RESP CULT: NORMAL
BACTERIA SPEC RESP CULT: NORMAL
GRAM STN SPEC: NORMAL
GRAM STN SPEC: NORMAL
MRSA SPEC QL CULT: NORMAL

## 2017-12-06 PROCEDURE — 94799 UNLISTED PULMONARY SVC/PX: CPT

## 2017-12-06 PROCEDURE — 25010000002 ENOXAPARIN PER 10 MG: Performed by: INTERNAL MEDICINE

## 2017-12-06 PROCEDURE — 25010000002 CEFTRIAXONE PER 250 MG: Performed by: INTERNAL MEDICINE

## 2017-12-06 PROCEDURE — 25010000002 METHYLPREDNISOLONE PER 40 MG: Performed by: INTERNAL MEDICINE

## 2017-12-06 PROCEDURE — 25010000002 HEPARIN FLUSH (PORCINE) 100 UNIT/ML SOLUTION: Performed by: INTERNAL MEDICINE

## 2017-12-06 RX ORDER — DOXYCYCLINE 100 MG/1
100 CAPSULE ORAL EVERY 12 HOURS SCHEDULED
Status: DISCONTINUED | OUTPATIENT
Start: 2017-12-06 | End: 2017-12-08 | Stop reason: HOSPADM

## 2017-12-06 RX ORDER — ALUMINA, MAGNESIA, AND SIMETHICONE 2400; 2400; 240 MG/30ML; MG/30ML; MG/30ML
15 SUSPENSION ORAL EVERY 6 HOURS PRN
Status: DISCONTINUED | OUTPATIENT
Start: 2017-12-06 | End: 2017-12-08 | Stop reason: HOSPADM

## 2017-12-06 RX ADMIN — Medication 10 ML: at 20:02

## 2017-12-06 RX ADMIN — METHYLPREDNISOLONE SODIUM SUCCINATE 40 MG: 40 INJECTION, POWDER, FOR SOLUTION INTRAMUSCULAR; INTRAVENOUS at 04:57

## 2017-12-06 RX ADMIN — LOSARTAN POTASSIUM 25 MG: 25 TABLET, FILM COATED ORAL at 08:36

## 2017-12-06 RX ADMIN — POTASSIUM CHLORIDE 20 MEQ: 750 CAPSULE, EXTENDED RELEASE ORAL at 08:36

## 2017-12-06 RX ADMIN — IPRATROPIUM BROMIDE AND ALBUTEROL SULFATE 3 ML: .5; 3 SOLUTION RESPIRATORY (INHALATION) at 18:55

## 2017-12-06 RX ADMIN — NICOTINE 1 PATCH: 14 PATCH, EXTENDED RELEASE TRANSDERMAL at 08:37

## 2017-12-06 RX ADMIN — HYDROMORPHONE HYDROCHLORIDE 8 MG: 2 TABLET ORAL at 10:23

## 2017-12-06 RX ADMIN — METHYLPREDNISOLONE SODIUM SUCCINATE 40 MG: 40 INJECTION, POWDER, FOR SOLUTION INTRAMUSCULAR; INTRAVENOUS at 11:48

## 2017-12-06 RX ADMIN — ALUMINUM HYDROXIDE, MAGNESIUM HYDROXIDE, AND DIMETHICONE 15 ML: 400; 400; 40 SUSPENSION ORAL at 20:38

## 2017-12-06 RX ADMIN — IPRATROPIUM BROMIDE AND ALBUTEROL SULFATE 3 ML: .5; 3 SOLUTION RESPIRATORY (INHALATION) at 23:19

## 2017-12-06 RX ADMIN — Medication 500 UNITS: at 20:01

## 2017-12-06 RX ADMIN — CEFTRIAXONE SODIUM 1 G: 1 INJECTION, SOLUTION INTRAVENOUS at 22:34

## 2017-12-06 RX ADMIN — GABAPENTIN 300 MG: 300 CAPSULE ORAL at 20:01

## 2017-12-06 RX ADMIN — FUROSEMIDE 80 MG: 80 TABLET ORAL at 08:36

## 2017-12-06 RX ADMIN — PANTOPRAZOLE SODIUM 40 MG: 40 TABLET, DELAYED RELEASE ORAL at 04:57

## 2017-12-06 RX ADMIN — IPRATROPIUM BROMIDE AND ALBUTEROL SULFATE 3 ML: .5; 3 SOLUTION RESPIRATORY (INHALATION) at 08:58

## 2017-12-06 RX ADMIN — ENOXAPARIN SODIUM 40 MG: 40 INJECTION SUBCUTANEOUS at 20:01

## 2017-12-06 RX ADMIN — ALPRAZOLAM 0.5 MG: 0.5 TABLET ORAL at 22:43

## 2017-12-06 RX ADMIN — GABAPENTIN 300 MG: 300 CAPSULE ORAL at 16:02

## 2017-12-06 RX ADMIN — DOXYCYCLINE 100 MG: 100 CAPSULE ORAL at 10:23

## 2017-12-06 RX ADMIN — TAMSULOSIN HYDROCHLORIDE 0.4 MG: 0.4 CAPSULE ORAL at 08:36

## 2017-12-06 RX ADMIN — DOXYCYCLINE 100 MG: 100 CAPSULE ORAL at 20:02

## 2017-12-06 RX ADMIN — HYDROMORPHONE HYDROCHLORIDE 8 MG: 2 TABLET ORAL at 17:28

## 2017-12-06 RX ADMIN — FUROSEMIDE 80 MG: 80 TABLET ORAL at 17:20

## 2017-12-06 RX ADMIN — METHYLPREDNISOLONE SODIUM SUCCINATE 40 MG: 40 INJECTION, POWDER, FOR SOLUTION INTRAMUSCULAR; INTRAVENOUS at 20:00

## 2017-12-06 RX ADMIN — GABAPENTIN 300 MG: 300 CAPSULE ORAL at 08:36

## 2017-12-06 RX ADMIN — CITALOPRAM 20 MG: 20 TABLET, FILM COATED ORAL at 08:36

## 2017-12-06 RX ADMIN — IPRATROPIUM BROMIDE AND ALBUTEROL SULFATE 3 ML: .5; 3 SOLUTION RESPIRATORY (INHALATION) at 12:27

## 2017-12-06 RX ADMIN — CARVEDILOL 6.25 MG: 6.25 TABLET, FILM COATED ORAL at 08:36

## 2017-12-06 NOTE — PLAN OF CARE
Problem: Patient Care Overview (Adult)  Goal: Plan of Care Review  Outcome: Ongoing (interventions implemented as appropriate)    12/06/17 4062   Coping/Psychosocial Response Interventions   Plan Of Care Reviewed With patient   Patient Care Overview   Progress progress toward functional goals as expected   Outcome Evaluation   Outcome Summary/Follow up Plan pt ambulating around room. 4l o2. minimal sob on exertion. vss. abx changed to po. cont to monitor         Problem: Fall Risk (Adult)  Goal: Absence of Falls  Outcome: Ongoing (interventions implemented as appropriate)    Problem: Activity Intolerance (Adult)  Goal: Activity Tolerance  Outcome: Ongoing (interventions implemented as appropriate)    Problem: Pneumonia (Adult)  Goal: Signs and Symptoms of Listed Potential Problems Will be Absent or Manageable (Pneumonia)  Outcome: Ongoing (interventions implemented as appropriate)    Problem: Pain, Chronic (Adult)  Goal: Acceptable Pain Control/Comfort Level  Outcome: Ongoing (interventions implemented as appropriate)    Problem: Respiratory Insufficiency (Adult)  Goal: Acid/Base Balance  Outcome: Ongoing (interventions implemented as appropriate)

## 2017-12-06 NOTE — PLAN OF CARE
Problem: Patient Care Overview (Adult)  Goal: Plan of Care Review  Outcome: Ongoing (interventions implemented as appropriate)    12/05/17 8229   Coping/Psychosocial Response Interventions   Plan Of Care Reviewed With patient;spouse   Patient Care Overview   Progress progress toward functional goals as expected   Outcome Evaluation   Outcome Summary/Follow up Plan Alert,oriented, VSS, afebrile, lungs with scattered exp wheezes, no SOB, O2 sats on 5l 90-94, ambulated in [place] with O2 and assist, voiding without difficulty, medicated for pain and anxiety as needed, wife present, continue to monitor       Goal: Adult Individualization and Mutuality  Outcome: Ongoing (interventions implemented as appropriate)  Goal: Discharge Needs Assessment  Outcome: Ongoing (interventions implemented as appropriate)    Problem: Fall Risk (Adult)  Goal: Absence of Falls  Outcome: Ongoing (interventions implemented as appropriate)    Problem: Activity Intolerance (Adult)  Goal: Activity Tolerance  Outcome: Ongoing (interventions implemented as appropriate)  Goal: Effective Energy Conservation Techniques  Outcome: Ongoing (interventions implemented as appropriate)    Problem: Pneumonia (Adult)  Goal: Signs and Symptoms of Listed Potential Problems Will be Absent or Manageable (Pneumonia)  Outcome: Ongoing (interventions implemented as appropriate)    Problem: Pain, Chronic (Adult)  Goal: Acceptable Pain Control/Comfort Level  Outcome: Ongoing (interventions implemented as appropriate)    Problem: Respiratory Insufficiency (Adult)  Goal: Acid/Base Balance  Outcome: Ongoing (interventions implemented as appropriate)  Goal: Effective Ventilation  Outcome: Ongoing (interventions implemented as appropriate)

## 2017-12-06 NOTE — PROGRESS NOTES
Daily Progress Note.     12/5/2017      Cody Simental ,  62 y.o. ,male  [place] [address] [address]      Brief problem (summary)  · Acute on chronic hypoxic respiratory failure  · History of lung cancer small cell  · COPD  · Pneumonia  · History of  continued tobacco smoking  · Anxiety      Today, patient states he feels better now is back to his baseline oxygen at 4 L.  He reports that he is feeling much better and breathing much better and the wife is at the bedside.  Not having much cough sputum production but does has wheezing    PMS/FH/SH: reviewed and unchanged.  Medications:     carvedilol 6.25 mg Oral Daily   ceftriaxone 1 g Intravenous Q24H   citalopram 20 mg Oral Daily   doxycycline 100 mg Intravenous Q12H   furosemide 80 mg Oral BID   gabapentin 300 mg Oral TID   heparin flush (porcine) 5 mL Intracatheter Q12H   ipratropium-albuterol 3 mL Nebulization Q6H - RT   losartan 25 mg Oral Q24H   methylPREDNISolone sodium succinate 40 mg Intravenous Q8H   nicotine 1 patch Transdermal Q24H   [START ON 12/6/2017] pantoprazole 40 mg Oral Q AM   potassium chloride 20 mEq Oral Daily   sodium chloride 10 mL Intracatheter Q12H   tamsulosin 0.4 mg Oral Daily          ROS:  Respiratory ROS: positive for - cough, shortness of breath and wheezing  negative for - hemoptysis    Objective  Temp:  [97.5 °F (36.4 °C)-98.2 °F (36.8 °C)] 97.6 °F (36.4 °C)  Heart Rate:  [82-93] 89  Resp:  [16-20] 18  BP: (122-145)/(70-79) 145/79  I/O last 3 completed shifts:  In: 900 [P.O.:800; IV Piggyback:100]  Out: -        Physical Exam   Constitutional: He is oriented to person, place, and time. He appears well-developed and well-nourished.   HENT:   Head: Atraumatic.   Eyes: Pupils are equal, round, and reactive to light. No scleral icterus.   Cardiovascular: Normal rate and regular rhythm.    Pulmonary/Chest: No accessory muscle usage. No respiratory distress. He has no decreased breath sounds. He has no wheezes.   Abdominal: Soft.  Normal appearance and bowel sounds are normal. He exhibits no ascites. There is no hepatosplenomegaly.   Neurological: He is alert and oriented to person, place, and time.   Skin: No laceration and no petechiae noted. No cyanosis. Nails show no clubbing.   Psychiatric: He has a normal mood and affect. His behavior is normal.   Vitals reviewed.         Results from last 7 days  Lab Units 12/03/17 2010   WBC 10*3/mm3 7.08   HEMOGLOBIN g/dL 16.4   HEMATOCRIT % 54.4*   PLATELETS 10*3/mm3 175       Results from last 7 days  Lab Units 12/03/17 2010   SODIUM mmol/L 140   POTASSIUM mmol/L 4.4   CHLORIDE mmol/L 94*   CO2 mmol/L 38.8*   BUN mg/dL 14   CREATININE mg/dL 0.80   GLUCOSE mg/dL 92   CALCIUM mg/dL 8.9       ASSESSMENT/ PLAN:  · Acute on chronic hypoxic respiratory failure improving  · Pneumonia, continue antibiotics namely Rocephin and doxycycline  · COPD exacerbation  · Anxiety  · Depression       Ulices Cruz MD, [facility]  Pulmonary, Critical Care and Sleep Medicine.  [facility]    12/5/2017 ,    Much of this encounter note is an electronic transcription/translation of spoken language to printed text. The electronic translation of spoken language may permit erroneous, or at times, nonsensical words or phrases to be inadvertently transcribed; Although I have reviewed the note for such errors, some may still exist.

## 2017-12-06 NOTE — DISCHARGE PLACEMENT REQUEST
"Tacho Simental (62 y.o. Male)     Date of Birth Social Security Number Address Home Phone MRN    1955  [address] [place] [address] [phone] [id number]    Mu-ism Marital Status          Episcopalian        Admission Date Admission Type Admitting Provider Attending Provider Department, Room/Bed    12/3/17 Emergency Jairon Gaines MD Ramaswamy, Rajanna B, MD [address], 662/1    Discharge Date Discharge Disposition Discharge Destination                      Attending Provider: Ulices Cruz MD     Allergies:  Atorvastatin, Simvastatin, Baclofen, Lyrica [Pregabalin]    Isolation:  None   Infection:  None   Code Status:  Prior    Ht:  180.3 cm (71\")   Wt:  95.8 kg (211 lb 1.6 oz)    Admission Cmt:  None   Principal Problem:  None                Active Insurance as of 12/3/2017     Primary Coverage     Payor Plan Insurance Group Employer/Plan Group    MEDICARE MEDICARE A & B      Payor Plan Address Payor Plan Phone Number Effective From Effective To    [address] [phone] 4/1/2010     [place] [address]       Subscriber Name Subscriber Birth Date Member ID       TACHO SIMENTAL 1955 [id number]                 Emergency Contacts      (Rel.) Home Phone Work Phone Mobile Phone    SimentalClaudette nogueira (Spouse) [phone] -- [phone]              "

## 2017-12-06 NOTE — PROGRESS NOTES
Continued Stay Note  [facility]     Patient Name: Cody Simental  MRN: [id number]  Today's Date: 12/6/2017    Admit Date: 12/3/2017          Discharge Plan       12/06/17 1308    Case Management/Social Work Plan    Plan Home with wife and C[employer]     Patient/Family In Agreement With Plan yes    Additional Comments RN informed that patient's wife would like CCP to call her. Called Mrs. Parmar [phone] and she informs that they want [facility] not [facility]. She says no other needs. Called referral to [facility]  and called to [facility]  [phone] to cancel referral.  CCP will continue to follow and assist as needed.    12/6/17 1330 - Inbound call from [facility]  and they have accepted the patient.               Discharge Codes     None            Felisa Williamson RN

## 2017-12-06 NOTE — PLAN OF CARE
Problem: Patient Care Overview (Adult)  Goal: Plan of Care Review  Outcome: Ongoing (interventions implemented as appropriate)    12/06/17 0332   Coping/Psychosocial Response Interventions   Plan Of Care Reviewed With patient   Patient Care Overview   Progress improving   Outcome Evaluation   Outcome Summary/Follow up Plan Alert and oriented. Reports feeling much better this shift. VSS. Back down to 4 L humidified O2 with BiPAP overnight. IV ABX continued., steroids given. Medicated PRN for pain and anxiety. No other signs of distress. Will continue to monitor.         Problem: Fall Risk (Adult)  Goal: Absence of Falls  Outcome: Ongoing (interventions implemented as appropriate)    Problem: Activity Intolerance (Adult)  Goal: Activity Tolerance  Outcome: Ongoing (interventions implemented as appropriate)  Goal: Effective Energy Conservation Techniques  Outcome: Ongoing (interventions implemented as appropriate)    Problem: Pneumonia (Adult)  Goal: Signs and Symptoms of Listed Potential Problems Will be Absent or Manageable (Pneumonia)  Outcome: Ongoing (interventions implemented as appropriate)    Problem: Pain, Chronic (Adult)  Goal: Acceptable Pain Control/Comfort Level  Outcome: Ongoing (interventions implemented as appropriate)    Problem: Respiratory Insufficiency (Adult)  Goal: Acid/Base Balance  Outcome: Ongoing (interventions implemented as appropriate)  Goal: Effective Ventilation  Outcome: Ongoing (interventions implemented as appropriate)

## 2017-12-07 ENCOUNTER — APPOINTMENT (OUTPATIENT)
Dept: GENERAL RADIOLOGY | Facility: HOSPITAL | Age: 62
End: 2017-12-07
Attending: INTERNAL MEDICINE

## 2017-12-07 PROCEDURE — 94799 UNLISTED PULMONARY SVC/PX: CPT

## 2017-12-07 PROCEDURE — 25010000002 ENOXAPARIN PER 10 MG: Performed by: INTERNAL MEDICINE

## 2017-12-07 PROCEDURE — 94640 AIRWAY INHALATION TREATMENT: CPT

## 2017-12-07 PROCEDURE — 25010000002 HEPARIN FLUSH (PORCINE) 100 UNIT/ML SOLUTION: Performed by: INTERNAL MEDICINE

## 2017-12-07 PROCEDURE — 25010000002 METHYLPREDNISOLONE PER 40 MG: Performed by: INTERNAL MEDICINE

## 2017-12-07 PROCEDURE — 71020 HC CHEST PA AND LATERAL: CPT

## 2017-12-07 RX ORDER — CEFDINIR 300 MG/1
300 CAPSULE ORAL EVERY 12 HOURS SCHEDULED
Status: DISCONTINUED | OUTPATIENT
Start: 2017-12-07 | End: 2017-12-08 | Stop reason: HOSPADM

## 2017-12-07 RX ORDER — PREDNISONE 20 MG/1
40 TABLET ORAL
Status: DISCONTINUED | OUTPATIENT
Start: 2017-12-08 | End: 2017-12-08 | Stop reason: HOSPADM

## 2017-12-07 RX ADMIN — Medication 500 UNITS: at 09:18

## 2017-12-07 RX ADMIN — IPRATROPIUM BROMIDE AND ALBUTEROL SULFATE 3 ML: .5; 3 SOLUTION RESPIRATORY (INHALATION) at 20:33

## 2017-12-07 RX ADMIN — LOSARTAN POTASSIUM 25 MG: 25 TABLET, FILM COATED ORAL at 09:17

## 2017-12-07 RX ADMIN — DOXYCYCLINE 100 MG: 100 CAPSULE ORAL at 20:16

## 2017-12-07 RX ADMIN — FUROSEMIDE 80 MG: 80 TABLET ORAL at 09:17

## 2017-12-07 RX ADMIN — NICOTINE 1 PATCH: 14 PATCH, EXTENDED RELEASE TRANSDERMAL at 09:17

## 2017-12-07 RX ADMIN — ENOXAPARIN SODIUM 40 MG: 40 INJECTION SUBCUTANEOUS at 20:16

## 2017-12-07 RX ADMIN — POTASSIUM CHLORIDE 20 MEQ: 750 CAPSULE, EXTENDED RELEASE ORAL at 09:17

## 2017-12-07 RX ADMIN — CARVEDILOL 6.25 MG: 6.25 TABLET, FILM COATED ORAL at 09:17

## 2017-12-07 RX ADMIN — IPRATROPIUM BROMIDE AND ALBUTEROL SULFATE 3 ML: .5; 3 SOLUTION RESPIRATORY (INHALATION) at 12:05

## 2017-12-07 RX ADMIN — TAMSULOSIN HYDROCHLORIDE 0.4 MG: 0.4 CAPSULE ORAL at 09:17

## 2017-12-07 RX ADMIN — FUROSEMIDE 80 MG: 80 TABLET ORAL at 18:13

## 2017-12-07 RX ADMIN — CEFDINIR 300 MG: 300 CAPSULE ORAL at 20:16

## 2017-12-07 RX ADMIN — Medication 10 ML: at 20:16

## 2017-12-07 RX ADMIN — ALPRAZOLAM 0.5 MG: 0.5 TABLET ORAL at 19:50

## 2017-12-07 RX ADMIN — HYDROMORPHONE HYDROCHLORIDE 8 MG: 2 TABLET ORAL at 03:48

## 2017-12-07 RX ADMIN — GABAPENTIN 300 MG: 300 CAPSULE ORAL at 09:17

## 2017-12-07 RX ADMIN — HYDROMORPHONE HYDROCHLORIDE 8 MG: 2 TABLET ORAL at 08:14

## 2017-12-07 RX ADMIN — Medication 10 ML: at 09:18

## 2017-12-07 RX ADMIN — HYDROMORPHONE HYDROCHLORIDE 4 MG: 2 TABLET ORAL at 14:35

## 2017-12-07 RX ADMIN — GABAPENTIN 300 MG: 300 CAPSULE ORAL at 20:16

## 2017-12-07 RX ADMIN — HYDROMORPHONE HYDROCHLORIDE 4 MG: 2 TABLET ORAL at 14:41

## 2017-12-07 RX ADMIN — METHYLPREDNISOLONE SODIUM SUCCINATE 40 MG: 40 INJECTION, POWDER, FOR SOLUTION INTRAMUSCULAR; INTRAVENOUS at 12:08

## 2017-12-07 RX ADMIN — Medication 500 UNITS: at 20:17

## 2017-12-07 RX ADMIN — CITALOPRAM 20 MG: 20 TABLET, FILM COATED ORAL at 09:17

## 2017-12-07 RX ADMIN — PANTOPRAZOLE SODIUM 40 MG: 40 TABLET, DELAYED RELEASE ORAL at 06:50

## 2017-12-07 RX ADMIN — DOXYCYCLINE 100 MG: 100 CAPSULE ORAL at 09:16

## 2017-12-07 RX ADMIN — GABAPENTIN 300 MG: 300 CAPSULE ORAL at 16:34

## 2017-12-07 RX ADMIN — METHYLPREDNISOLONE SODIUM SUCCINATE 40 MG: 40 INJECTION, POWDER, FOR SOLUTION INTRAMUSCULAR; INTRAVENOUS at 03:49

## 2017-12-07 NOTE — PROGRESS NOTES
Daily Progress Note.     12/6/2017      Cody Simental ,  62 y.o. ,male  [place] [address] [address]      Brief problem (summary)  · Acute on chronic hypoxic respiratory failure  · History of lung cancer small cell  · COPD  · Pneumonia  · History of  continued tobacco smoking  · Anxiety      Today, patient states he feels better now is back to his baseline oxygen at 4 L.  He reports that he is feeling much better and breathing much better and the wife is at the bedside.  Not having much cough sputum production but does has wheezing    PMS/FH/SH: reviewed and unchanged.  Medications:     carvedilol 6.25 mg Oral Daily   ceftriaxone 1 g Intravenous Q24H   citalopram 20 mg Oral Daily   doxycycline 100 mg Oral Q12H   enoxaparin 40 mg Subcutaneous Q24H   furosemide 80 mg Oral BID   gabapentin 300 mg Oral TID   heparin flush (porcine) 5 mL Intracatheter Q12H   ipratropium-albuterol 3 mL Nebulization Q6H - RT   losartan 25 mg Oral Q24H   methylPREDNISolone sodium succinate 40 mg Intravenous Q8H   nicotine 1 patch Transdermal Q24H   pantoprazole 40 mg Oral Q AM   potassium chloride 20 mEq Oral Daily   sodium chloride 10 mL Intracatheter Q12H   tamsulosin 0.4 mg Oral Daily          ROS:  Respiratory ROS: positive for - cough, shortness of breath and wheezing  negative for - hemoptysis    Objective  Temp:  [98.2 °F (36.8 °C)-98.3 °F (36.8 °C)] 98.2 °F (36.8 °C)  Heart Rate:  [69-97] 90  Resp:  [16-20] 18  BP: (100-138)/(63-76) 138/72  I/O last 3 completed shifts:  In: 150 [IV Piggyback:150]  Out: 1550 [Urine:1550]       Physical Exam   Constitutional: He is oriented to person, place, and time. He appears well-developed and well-nourished.   HENT:   Head: Atraumatic.   Eyes: Pupils are equal, round, and reactive to light. No scleral icterus.   Cardiovascular: Normal rate and regular rhythm.    Pulmonary/Chest: No accessory muscle usage. No respiratory distress. He has no decreased breath sounds. He has no wheezes.    Abdominal: Soft. Normal appearance and bowel sounds are normal. He exhibits no ascites. There is no hepatosplenomegaly.   Neurological: He is alert and oriented to person, place, and time.   Skin: No laceration and no petechiae noted. No cyanosis. Nails show no clubbing.   Psychiatric: He has a normal mood and affect. His behavior is normal.   Vitals reviewed.         Results from last 7 days  Lab Units 12/03/17 2010   WBC 10*3/mm3 7.08   HEMOGLOBIN g/dL 16.4   HEMATOCRIT % 54.4*   PLATELETS 10*3/mm3 175       Results from last 7 days  Lab Units 12/03/17 2010   SODIUM mmol/L 140   POTASSIUM mmol/L 4.4   CHLORIDE mmol/L 94*   CO2 mmol/L 38.8*   BUN mg/dL 14   CREATININE mg/dL 0.80   GLUCOSE mg/dL 92   CALCIUM mg/dL 8.9       ASSESSMENT/ PLAN:  · Acute on chronic hypoxic respiratory failure improving  · Pneumonia, continue antibiotics namely Rocephin and doxycycline  · COPD exacerbation  · Anxiety  · Depression   · Lung cancer    CXr in AM    Ulices Cruz MD, [facility]  Pulmonary, Critical Care and Sleep Medicine.  [facility]    12/6/2017 ,    Much of this encounter note is an electronic transcription/translation of spoken language to printed text. The electronic translation of spoken language may permit erroneous, or at times, nonsensical words or phrases to be inadvertently transcribed; Although I have reviewed the note for such errors, some may still exist.

## 2017-12-07 NOTE — PROGRESS NOTES
Daily Progress Note.     12/7/2017      Cody Simental ,  62 y.o. ,male  [address]      Brief problem (summary)  · Acute on chronic hypoxic respiratory failure  · History of lung cancer small cell  · COPD  · Pneumonia  · History of  continued tobacco smoking  · Anxiety      Today, patient states he feels better now is back to his baseline oxygen at 4 L.  He reports that he is feeling much better and breathing much better and the wife is at the bedside.  Not having much cough sputum production but does has wheezing    PMS/FH/SH: reviewed and unchanged.  Medications:     carvedilol 6.25 mg Oral Daily   ceftriaxone 1 g Intravenous Q24H   citalopram 20 mg Oral Daily   doxycycline 100 mg Oral Q12H   enoxaparin 40 mg Subcutaneous Q24H   furosemide 80 mg Oral BID   gabapentin 300 mg Oral TID   heparin flush (porcine) 5 mL Intracatheter Q12H   ipratropium-albuterol 3 mL Nebulization Q6H - RT   losartan 25 mg Oral Q24H   methylPREDNISolone sodium succinate 40 mg Intravenous Q8H   nicotine 1 patch Transdermal Q24H   pantoprazole 40 mg Oral Q AM   potassium chloride 20 mEq Oral Daily   sodium chloride 10 mL Intracatheter Q12H   tamsulosin 0.4 mg Oral Daily          ROS:  Respiratory ROS: positive for - cough, shortness of breath and wheezing  negative for - hemoptysis    Objective  Temp:  [98.4 °F (36.9 °C)-98.9 °F (37.2 °C)] 98.9 °F (37.2 °C)  Heart Rate:  [74-90] 74  Resp:  [16-18] 18  BP: (123-143)/(72-80) 128/75  I/O last 3 completed shifts:  In: 150 [IV Piggyback:150]  Out: 2150 [Urine:2150]  I/O this shift:  In: 650 [P.O.:650]  Out: -     Physical Exam   Constitutional: He is oriented to person, place, and time. He appears well-developed and well-nourished.   HENT:   Head: Atraumatic.   Eyes: Pupils are equal, round, and reactive to light. No scleral icterus.   Cardiovascular: Normal rate and regular rhythm.    Pulmonary/Chest: No accessory muscle usage. No respiratory distress. He has no  decreased breath sounds. He has no wheezes.   Abdominal: Soft. Normal appearance and bowel sounds are normal. He exhibits no ascites. There is no hepatosplenomegaly.   Neurological: He is alert and oriented to person, place, and time.   Skin: No laceration and no petechiae noted. No cyanosis. Nails show no clubbing.   Psychiatric: He has a normal mood and affect. His behavior is normal.   Vitals reviewed.         Results from last 7 days  Lab Units 12/03/17 2010   WBC 10*3/mm3 7.08   HEMOGLOBIN g/dL 16.4   HEMATOCRIT % 54.4*   PLATELETS 10*3/mm3 175       Results from last 7 days  Lab Units 12/03/17 2010   SODIUM mmol/L 140   POTASSIUM mmol/L 4.4   CHLORIDE mmol/L 94*   CO2 mmol/L 38.8*   BUN mg/dL 14   CREATININE mg/dL 0.80   GLUCOSE mg/dL 92   CALCIUM mg/dL 8.9           ASSESSMENT/ PLAN:  · Acute on chronic hypoxic respiratory failure improving  · Pneumonia, continue antibiotics namely Rocephin and doxycycline  · COPD exacerbation  · Anxiety  · Depression   · Lung cancer    Change antibiotics to po and possible home Friday    Ulices Cruz MD, [facility]  Pulmonary, Critical Care and Sleep Medicine.  [facility]    12/7/2017 ,    Much of this encounter note is an electronic transcription/translation of spoken language to printed text. The electronic translation of spoken language may permit erroneous, or at times, nonsensical words or phrases to be inadvertently transcribed; Although I have reviewed the note for such errors, some may still exist.

## 2017-12-07 NOTE — PLAN OF CARE
Problem: Patient Care Overview (Adult)  Goal: Plan of Care Review  Outcome: Ongoing (interventions implemented as appropriate)    12/07/17 7754   Coping/Psychosocial Response Interventions   Plan Of Care Reviewed With patient   Patient Care Overview   Progress improving   Outcome Evaluation   Outcome Summary/Follow up Plan Pt had a really good day today. He stated that he's feeling a lot better. Up ad melissa, ambulated the halls. Pain well controlled with PO Dilaudid. O2 stable--pt wears CPAP at HS. Steriods and abx switched to PO. Likely to be D/C'd tomorrow. Will continue to monitor.

## 2017-12-07 NOTE — PLAN OF CARE
Problem: Patient Care Overview (Adult)  Goal: Plan of Care Review  Outcome: Ongoing (interventions implemented as appropriate)    12/07/17 9783   Coping/Psychosocial Response Interventions   Plan Of Care Reviewed With patient   Patient Care Overview   Progress progress toward functional goals as expected   Outcome Evaluation   Outcome Summary/Follow up Plan Pt has been up ad melissa. Given rocephin IV and xanax early evening. C/o of pain @ 0400 and given oral dilaudid. Pt currently resting in room with Bi Pap mask on. VS are stable, had a couple episodes of arrythmias throughout night, but quickly went back into SR. Will continue to monitor.       Goal: Adult Individualization and Mutuality  Outcome: Ongoing (interventions implemented as appropriate)  Goal: Discharge Needs Assessment  Outcome: Ongoing (interventions implemented as appropriate)    Problem: Fall Risk (Adult)  Goal: Absence of Falls  Outcome: Ongoing (interventions implemented as appropriate)    Problem: Activity Intolerance (Adult)  Goal: Activity Tolerance  Outcome: Ongoing (interventions implemented as appropriate)  Goal: Effective Energy Conservation Techniques  Outcome: Ongoing (interventions implemented as appropriate)    Problem: Pneumonia (Adult)  Goal: Signs and Symptoms of Listed Potential Problems Will be Absent or Manageable (Pneumonia)  Outcome: Ongoing (interventions implemented as appropriate)    Problem: Pain, Chronic (Adult)  Goal: Acceptable Pain Control/Comfort Level  Outcome: Ongoing (interventions implemented as appropriate)    Problem: Respiratory Insufficiency (Adult)  Goal: Acid/Base Balance  Outcome: Ongoing (interventions implemented as appropriate)  Goal: Effective Ventilation  Outcome: Ongoing (interventions implemented as appropriate)

## 2017-12-08 VITALS
WEIGHT: 211.1 LBS | OXYGEN SATURATION: 92 % | DIASTOLIC BLOOD PRESSURE: 94 MMHG | SYSTOLIC BLOOD PRESSURE: 165 MMHG | HEART RATE: 102 BPM | HEIGHT: 71 IN | TEMPERATURE: 97.9 F | BODY MASS INDEX: 29.55 KG/M2 | RESPIRATION RATE: 20 BRPM

## 2017-12-08 PROBLEM — J96.21 ACUTE ON CHRONIC RESPIRATORY FAILURE WITH HYPOXIA (HCC): Status: ACTIVE | Noted: 2017-12-08

## 2017-12-08 LAB
BACTERIA SPEC AEROBE CULT: NORMAL
BACTERIA SPEC AEROBE CULT: NORMAL

## 2017-12-08 PROCEDURE — 25010000002 HEPARIN FLUSH (PORCINE) 100 UNIT/ML SOLUTION: Performed by: INTERNAL MEDICINE

## 2017-12-08 PROCEDURE — 94799 UNLISTED PULMONARY SVC/PX: CPT

## 2017-12-08 PROCEDURE — 63710000001 PREDNISONE PER 1 MG: Performed by: INTERNAL MEDICINE

## 2017-12-08 RX ORDER — DOXYCYCLINE 100 MG/1
100 CAPSULE ORAL EVERY 12 HOURS SCHEDULED
Qty: 5 CAPSULE | Refills: 0 | Status: SHIPPED | OUTPATIENT
Start: 2017-12-08 | End: 2017-12-11

## 2017-12-08 RX ORDER — CEFDINIR 300 MG/1
300 CAPSULE ORAL EVERY 12 HOURS SCHEDULED
Qty: 4 CAPSULE | Refills: 0 | Status: SHIPPED | OUTPATIENT
Start: 2017-12-08 | End: 2017-12-10

## 2017-12-08 RX ORDER — PREDNISONE 10 MG/1
TABLET ORAL
Qty: 30 TABLET | Refills: 0 | Status: SHIPPED | OUTPATIENT
Start: 2017-12-08 | End: 2017-12-19

## 2017-12-08 RX ADMIN — TAMSULOSIN HYDROCHLORIDE 0.4 MG: 0.4 CAPSULE ORAL at 09:14

## 2017-12-08 RX ADMIN — Medication 500 UNITS: at 09:14

## 2017-12-08 RX ADMIN — PREDNISONE 40 MG: 20 TABLET ORAL at 09:14

## 2017-12-08 RX ADMIN — CITALOPRAM 20 MG: 20 TABLET, FILM COATED ORAL at 09:14

## 2017-12-08 RX ADMIN — LOSARTAN POTASSIUM 25 MG: 25 TABLET, FILM COATED ORAL at 09:14

## 2017-12-08 RX ADMIN — NICOTINE 1 PATCH: 14 PATCH, EXTENDED RELEASE TRANSDERMAL at 09:15

## 2017-12-08 RX ADMIN — FUROSEMIDE 80 MG: 80 TABLET ORAL at 09:14

## 2017-12-08 RX ADMIN — DOXYCYCLINE 100 MG: 100 CAPSULE ORAL at 09:14

## 2017-12-08 RX ADMIN — CEFDINIR 300 MG: 300 CAPSULE ORAL at 09:14

## 2017-12-08 RX ADMIN — POTASSIUM CHLORIDE 20 MEQ: 750 CAPSULE, EXTENDED RELEASE ORAL at 09:14

## 2017-12-08 RX ADMIN — PANTOPRAZOLE SODIUM 40 MG: 40 TABLET, DELAYED RELEASE ORAL at 06:19

## 2017-12-08 RX ADMIN — HYDROMORPHONE HYDROCHLORIDE 8 MG: 2 TABLET ORAL at 03:54

## 2017-12-08 RX ADMIN — HYDROMORPHONE HYDROCHLORIDE 8 MG: 2 TABLET ORAL at 09:15

## 2017-12-08 RX ADMIN — Medication 10 ML: at 09:14

## 2017-12-08 RX ADMIN — IPRATROPIUM BROMIDE AND ALBUTEROL SULFATE 3 ML: .5; 3 SOLUTION RESPIRATORY (INHALATION) at 07:53

## 2017-12-08 RX ADMIN — CARVEDILOL 6.25 MG: 6.25 TABLET, FILM COATED ORAL at 09:14

## 2017-12-08 RX ADMIN — ALPRAZOLAM 0.5 MG: 0.5 TABLET ORAL at 09:20

## 2017-12-08 RX ADMIN — GABAPENTIN 300 MG: 300 CAPSULE ORAL at 09:14

## 2017-12-08 NOTE — DISCHARGE SUMMARY
DISCHARGE SUMMARY    Cody Simental  1955  male  62 y.o.    Date of Admission: 12/3/2017  Date of Discharge:  12/8/2017    PCP: Melecio Orozco Jr., MD    Discharge Diagnosis:  · Acute on chronic hypoxic respiratory failure  · History of lung cancer small cell  · COPD  · Pneumonia  · History of  continued tobacco smoking  · Anxiety    Hospital Course (brief)  This is a 62-year-old male was known to me with a history of chronic hypoxic respiratory failure and also COPD.  In addition he has a history of small cell lung cancer was undergone treatment with a CBC.  He had chemotherapy that now he is being followed with [facility] and had a CT since CT which showed normal progression of the disease.  He also had a CT at [facility] which shows no evidence of progression of the disease but had a new infiltrate suggesting pneumonia.  At the time of admission he was started with Rocephin and doxycycline and also Solu-Medrol.  His follow-up chest x-ray shows good improvement and clinically patient has significantly improved.  He is not in any respiratory distress and is back to his baseline oxygen which is 3-4 L of oxygen.  Patient is not coughing up any sputum and does not have any hemoptysis he is going to be discharged in a stable condition with prednisone taper, doxycycline and Omnicef to finish off his course.  He will also follow-up in our office as he has already has appointment to be seen in the next couple of months.    Procedures Performed:  · CT chest 12/4/2017  · There is new patchy areas of groundglass and consolidative density  now seen within the right middle and lower lobe most likely representing  postinfectious etiology/pneumonia. There is a new small right and trace  left pleural effusion present. The primary right middle lobe mass and  subcarinal lymphadenopathy is largely unchanged from most recent chest  CT performed on 04/25/2017.hest              Pertinent Test Results:    Results from  last 7 days  Lab Units 17   WBC 10*3/mm3 7.08   HEMOGLOBIN g/dL 16.4   HEMATOCRIT % 54.4*   PLATELETS 10*3/mm3 175       Results from last 7 days  Lab Units 17   SODIUM mmol/L 140   POTASSIUM mmol/L 4.4   CHLORIDE mmol/L 94*   CO2 mmol/L 38.8*   BUN mg/dL 14   CREATININE mg/dL 0.80   GLUCOSE mg/dL 92   CALCIUM mg/dL 8.9       Condition on Discharge:   Stable.    Vital Signs past 24hrs  BP range: BP: (128-165)/(75-94) 165/94  Pulse range: Heart Rate:  [] 102  Resp rate range: Resp:  [16-20] 20  Temp range: Temp (24hrs), Av.4 °F (36.9 °C), Min:97.9 °F (36.6 °C), Max:98.9 °F (37.2 °C)    O2 Device: nasal cannulaFlow (L/min):  [3-5] 3  Oxygen range:SpO2:  [92 %-94 %] 92 %   95.8 kg (211 lb 1.6 oz); Body mass index is 29.44 kg/(m^2).    Intake/Output Summary (Last 24 hours) at 17 1019  Last data filed at 17   Gross per 24 hour   Intake             1070 ml   Output                0 ml   Net             1070 ml       Physical Exam   Constitutional: He is oriented to person, place, and time. He appears well-developed and well-nourished. No distress. Nasal cannula in place.   HENT:   Head: Normocephalic and atraumatic.   Eyes: Pupils are equal, round, and reactive to light. No scleral icterus.   Cardiovascular: Normal rate and regular rhythm.    Pulmonary/Chest: Effort normal. No respiratory distress. He has decreased breath sounds. He has wheezes.   Abdominal: Soft. Bowel sounds are normal. There is no hepatosplenomegaly.   Neurological: He is alert and oriented to person, place, and time.   Skin: No cyanosis. Nails show no clubbing.   Psychiatric: He has a normal mood and affect. His behavior is normal.       Discharge Disposition  Home or Self Care    Discharge Medications   Cody Simental   Home Medication Instructions ALISON:[id number]    Printed on:17 1019   Medication Information                      albuterol (PROVENTIL HFA;VENTOLIN HFA) 108 (90 BASE) MCG/ACT  inhaler  Inhale 2 puffs Every 4 (Four) Hours As Needed for wheezing.             ALPRAZolam (XANAX) 0.5 MG tablet  Take 1 tablet by mouth 2 (Two) Times a Day As Needed for Anxiety.             ammonium lactate (AMLACTIN) 12 % cream  Apply 1 application topically As Needed for Itching.             aspirin 81 MG EC tablet  Take 81 mg by mouth Daily.             budesonide-formoterol (SYMBICORT) 160-4.5 MCG/ACT inhaler  Inhale 2 puffs 2 (Two) Times a Day.             carvedilol (COREG) 6.25 MG tablet  Take 6.25 mg by mouth 2 (Two) Times a Day.             cefdinir (OMNICEF) 300 MG capsule  Take 1 capsule by mouth Every 12 (Twelve) Hours for 4 doses. Indications: Pneumonia             citalopram (CeleXA) 20 MG tablet  TAKE ONE TABLET BY MOUTH ONCE DAILY             doxycycline (MONODOX) 100 MG capsule  Take 1 capsule by mouth Every 12 (Twelve) Hours for 5 doses. Indications: Pneumonia             esomeprazole (nexIUM) 40 MG capsule  Take 1 capsule by mouth Every Morning Before Breakfast.             fluticasone (FLONASE) 50 MCG/ACT nasal spray  2 sprays into each nostril Daily.             furosemide (LASIX) 80 MG tablet  Take 80 mg by mouth 2 (Two) Times a Day.             gabapentin (NEURONTIN) 600 MG tablet  Take 600 mg by mouth 4 (Four) Times a Day. 2 tabs 4x/day             HYDROmorphone (DILAUDID) 4 MG tablet  Take 8 mg by mouth Every 3 (Three) Hours As Needed.             ipratropium-albuterol (DUO-NEB) 0.5-2.5 mg/mL nebulizer  Take 3 mL by nebulization 4 (Four) Times a Day.             losartan (COZAAR) 25 MG tablet  Take 25 mg by mouth Daily.             O2 (OXYGEN)  Inhale 3 L/min As Needed.             ondansetron (ZOFRAN) 8 MG tablet  Take 1 tablet by mouth Every 8 (Eight) Hours As Needed for nausea or vomiting.             potassium chloride (KLOR-CON) 20 MEQ CR tablet  Take 40 mEq by mouth 2 (two) times a day.             predniSONE (DELTASONE) 10 MG tablet  4 tabs a day for 3 days 3 tabs a day for 3 days  2 tabs a day for 3 days 1 tab a day for 3 days             tamsulosin (FLOMAX) 0.4 MG capsule 24 hr capsule  Take 0.4 mg by mouth 2 (Two) Times a Day.             Testosterone Cypionate (DEPOTESTOTERONE CYPIONATE) 200 MG/ML injection               UNKNOWN TO PATIENT  Continuous. Pain pump Filled 11-17-16 with dilaudid per pt report                 Discharge Diet:  Diet Order   Procedures   • Diet Regular       Activity at Discharge:  As tolerated     Follow-up Appointments  Follow-up Information     Follow up with [facility] .    Specialty:  Home Health Services    Contact information:    [address] , [address]  [facility] [address] [phone]        Follow up with Melecio Orozco Jr., MD .    Specialty:  Family Medicine    Contact information:    [address] [facility]  [address]  [facility] [id number] [phone]           Follow-up with [facility].  Patient has appointment already made.    Ulices Cruz MD, [facility]  Pulmonary, Critical Care and Sleep Medicine.  [facility]    12/8/2017 ,    Time: I spent more than 30 min in the discharge planning of this patient.    Much of this encounter note is an electronic transcription/translation of spoken language to printed text. The electronic translation of spoken language may permit erroneous, or at times, nonsensical words or phrases to be inadvertently transcribed; Although I have reviewed the note for such errors, some may still exist.

## 2017-12-08 NOTE — PROGRESS NOTES
Continued Stay Note  [facility]     Patient Name: Cody Simental  MRN: [id number]  Today's Date: 12/8/2017    Admit Date: 12/3/2017          Discharge Plan       12/08/17 1103    Case Management/Social Work Plan    Plan Plan home with wife and [facility] Cespedes RN    Additional Comments Spoke with pt at bedside..   Pt plan is home with [facility] Jorgensen  [phone]) called and vm left stating pt was being discharged today.  Plan home with [facility] Cespedes RN              Discharge Codes     None        Expected Discharge Date and Time     Expected Discharge Date Expected Discharge Time    Dec 8, 2017             Karis Mclaughlin, RN

## 2017-12-08 NOTE — PLAN OF CARE
Problem: Patient Care Overview (Adult)  Goal: Plan of Care Review    12/08/17 0520   Outcome Evaluation   Outcome Summary/Follow up Plan SLEPT SOME THIS SHIFT, GOT AWAKEN BY LEG CRAMPS, DILAUDID 8MG GIVEN AS PER ORDER. PT O2 3LPM/NC WITH SATS STAYING AT 90-92%. DENIES SOB. UP TO BR AS NEEDED. NO ACUTE DISTERSS. VSS.        Goal: Adult Individualization and Mutuality  Outcome: Ongoing (interventions implemented as appropriate)  Goal: Discharge Needs Assessment  Outcome: Ongoing (interventions implemented as appropriate)

## 2017-12-08 NOTE — PLAN OF CARE
Problem: Patient Care Overview (Adult)  Goal: Plan of Care Review  Outcome: Ongoing (interventions implemented as appropriate)  Goal: Adult Individualization and Mutuality  Outcome: Ongoing (interventions implemented as appropriate)  Goal: Discharge Needs Assessment  Outcome: Ongoing (interventions implemented as appropriate)    Problem: Fall Risk (Adult)  Goal: Absence of Falls  Outcome: Ongoing (interventions implemented as appropriate)    Problem: Activity Intolerance (Adult)  Goal: Activity Tolerance  Outcome: Ongoing (interventions implemented as appropriate)  Goal: Effective Energy Conservation Techniques  Outcome: Ongoing (interventions implemented as appropriate)    Problem: Pneumonia (Adult)  Goal: Signs and Symptoms of Listed Potential Problems Will be Absent or Manageable (Pneumonia)  Outcome: Ongoing (interventions implemented as appropriate)    Problem: Pain, Chronic (Adult)  Goal: Acceptable Pain Control/Comfort Level  Outcome: Ongoing (interventions implemented as appropriate)

## 2017-12-11 NOTE — PROGRESS NOTES
Case Management Discharge Note    Final Note: Plan home with [facility] Rivas RN    Discharge Placement     Facility/Agency Request Status Selected? Address Phone Number Fax Number    [facility] Accepted    Yes [address], [address], [facility] [address] [phone]        Felisa Williamson RN 12/6/2017 13:08    Called to [place]               [facility] Accepted     [address], [facility] [address] [phone]        Felisa Williamson RN 12/5/2017 10:30    Called to [facility] and left [facility].                   Other: Other (Private Auto)    Discharge Codes: 06  Discharged/transferred to home under care of organized home health service organization in anticipation of skilled care

## 2017-12-13 ENCOUNTER — OFFICE VISIT (OUTPATIENT)
Dept: INTERNAL MEDICINE | Facility: CLINIC | Age: 62
End: 2017-12-13

## 2017-12-13 VITALS
OXYGEN SATURATION: 91 % | SYSTOLIC BLOOD PRESSURE: 104 MMHG | BODY MASS INDEX: 30.13 KG/M2 | HEART RATE: 102 BPM | TEMPERATURE: 97.8 F | DIASTOLIC BLOOD PRESSURE: 72 MMHG | WEIGHT: 216 LBS

## 2017-12-13 DIAGNOSIS — J96.21 ACUTE ON CHRONIC RESPIRATORY FAILURE WITH HYPOXIA (HCC): ICD-10-CM

## 2017-12-13 DIAGNOSIS — C34.2 MALIGNANT NEOPLASM OF MIDDLE LOBE OF RIGHT LUNG (HCC): ICD-10-CM

## 2017-12-13 DIAGNOSIS — J18.9 COMMUNITY ACQUIRED PNEUMONIA OF RIGHT MIDDLE LOBE OF LUNG: Primary | ICD-10-CM

## 2017-12-13 PROCEDURE — 99213 OFFICE O/P EST LOW 20 MIN: CPT | Performed by: FAMILY MEDICINE

## 2017-12-13 NOTE — PROGRESS NOTES
Subjective   Cody Simental is a 62 y.o. male.     No chief complaint on file.        History of Present Illness   Current outpatient and discharge medications have been reconciled for the patient.  Melecio Orozco Jr., MD      The following portions of the patient's history were reviewed and updated as appropriate: allergies, current medications, past social history and problem list.    Review of Systems   Constitutional: Negative.    HENT: Negative.    Eyes: Negative.    Respiratory: Negative.    Cardiovascular: Negative.    Gastrointestinal: Negative.    Endocrine: Negative.    Genitourinary: Negative.    Musculoskeletal: Negative.    Skin: Negative.    Allergic/Immunologic: Negative.    Neurological: Negative.    Hematological: Negative.    Psychiatric/Behavioral: Negative.        Objective   Vitals:    12/13/17 1433   BP: 104/72   Pulse: 102   Temp: 97.8 °F (36.6 °C)   SpO2: 91%     Physical Exam   Constitutional: He is oriented to person, place, and time. He appears well-developed and well-nourished.   HENT:   Head: Normocephalic and atraumatic.   Right Ear: Tympanic membrane and external ear normal.   Left Ear: Tympanic membrane and external ear normal.   Nose: Nose normal.   Mouth/Throat: Oropharynx is clear and moist.   Eyes: Conjunctivae and EOM are normal. Pupils are equal, round, and reactive to light.   Neck: Normal range of motion. Neck supple. No JVD present. No thyromegaly present.   Cardiovascular: Normal rate, regular rhythm, normal heart sounds and intact distal pulses.    Pulmonary/Chest: Effort normal and breath sounds normal.   Abdominal: Soft. Bowel sounds are normal.   Musculoskeletal: Normal range of motion.   Lymphadenopathy:     He has no cervical adenopathy.   Neurological: He is alert and oriented to person, place, and time. No cranial nerve deficit. Coordination normal.   Skin: Skin is warm and dry. No rash noted.   Psychiatric: He has a normal mood and affect. His behavior is normal.  Judgment and thought content normal.   Vitals reviewed.      Assessment/Plan   Problem List Items Addressed This Visit     None

## 2017-12-19 ENCOUNTER — TELEPHONE (OUTPATIENT)
Dept: INTERNAL MEDICINE | Facility: CLINIC | Age: 62
End: 2017-12-19

## 2017-12-19 ENCOUNTER — OFFICE VISIT (OUTPATIENT)
Dept: RETAIL CLINIC | Facility: CLINIC | Age: 62
End: 2017-12-19

## 2017-12-19 VITALS
RESPIRATION RATE: 20 BRPM | OXYGEN SATURATION: 91 % | HEART RATE: 92 BPM | BODY MASS INDEX: 31.74 KG/M2 | DIASTOLIC BLOOD PRESSURE: 64 MMHG | TEMPERATURE: 97.1 F | SYSTOLIC BLOOD PRESSURE: 110 MMHG | WEIGHT: 227.6 LBS

## 2017-12-19 DIAGNOSIS — R60.0 BILATERAL LOWER EXTREMITY EDEMA: Primary | ICD-10-CM

## 2017-12-19 PROCEDURE — 99213 OFFICE O/P EST LOW 20 MIN: CPT | Performed by: NURSE PRACTITIONER

## 2017-12-19 RX ORDER — METOLAZONE 5 MG/1
5 TABLET ORAL DAILY
Qty: 30 TABLET | Refills: 0 | Status: SHIPPED | OUTPATIENT
Start: 2017-12-19

## 2017-12-19 NOTE — TELEPHONE ENCOUNTER
I sent electronically metolazone 5 mg preceding one dose of Lasix No. 30.  He is to contact Dr. Humphreys's office also.  The metolazone is when necessary.  I talked to Claudette about this on the phone.

## 2017-12-19 NOTE — TELEPHONE ENCOUNTER
The pt's wife called, he has swelling in both feet that has actually caused some blisters and he has gained 11 pounds since you saw him on the 13th  Please call his wife, she doesn't want him to end up in the hospital

## 2017-12-19 NOTE — PROGRESS NOTES
"Subjective   Patient ID: Cody Simental is a 62 y.o. male presents with   Chief Complaint   Patient presents with   • Herpes Zoster       Leg Swelling   This is a new problem. The current episode started yesterday. The problem has been gradually worsening. Associated symptoms include coughing (intermittent) and fatigue. Pertinent negatives include no chest pain, chills, diaphoresis or fever. Treatments tried: patient cannot remember if he took his lasix today but thinks that he has not taken it. The treatment provided no relief.       Allergies   Allergen Reactions   • Atorvastatin Other (See Comments)     myalgias   • Simvastatin Other (See Comments)     myalgias   • Baclofen Mental Status Change     \"MAKES ME CRAZY\"   • Lyrica [Pregabalin] Other (See Comments)     Pt doesn't remember       The following portions of the patient's history were reviewed and updated as appropriate: allergies, current medications, past family history, past medical history, past social history, past surgical history and problem list.      Review of Systems   Constitutional: Positive for fatigue. Negative for chills, diaphoresis and fever.   Respiratory: Positive for cough (intermittent) and shortness of breath (no more than normal). Negative for chest tightness, wheezing and stridor.         O2 in place.  Reports that he was released from the hospital for PNA about a week ago.   Cardiovascular: Positive for leg swelling (bilateral). Negative for chest pain and palpitations.   Gastrointestinal: Negative.    Skin: Positive for color change (redness).        BLE and feet with slight redness, moderate swelling, pedal pain with touch.   Neurological: Negative for dizziness and light-headedness.       Objective     Vitals:    12/19/17 1229   BP: 110/64   Pulse: 92   Resp: 20   Temp: 97.1 °F (36.2 °C)   SpO2: 91%         Physical Exam   Constitutional: He is oriented to person, place, and time. He appears well-developed and well-nourished. He " does not appear ill. No distress.   HENT:   Head: Normocephalic.   Right Ear: Hearing and external ear normal.   Left Ear: Hearing and external ear normal.   Eyes: Conjunctivae are normal.   Sclera white.   Neck: No tracheal deviation present.   Cardiovascular: Normal rate, regular rhythm, S1 normal, S2 normal and normal heart sounds.    Pulmonary/Chest: Effort normal and breath sounds normal. No accessory muscle usage. No respiratory distress.   Abdominal: Soft. Bowel sounds are normal. There is no tenderness.   Lymphadenopathy:     He has no cervical adenopathy.   Neurological: He is alert and oriented to person, place, and time.   Skin: Skin is warm and dry. There is erythema.        BLE and feet with mild erythema and skin fissures to bilateral pedals. Pedal skin sensitive to touch.  BLE 3+ bilateral feet 1+   Vitals reviewed.        Cody was seen today for herpes zoster.    Diagnoses and all orders for this visit:    Bilateral lower extremity edema        Referred directly to ER for labs and poss IV medicine.

## 2017-12-22 ENCOUNTER — TELEPHONE (OUTPATIENT)
Dept: PSYCHIATRY | Facility: HOSPITAL | Age: 62
End: 2017-12-22

## 2017-12-22 NOTE — TELEPHONE ENCOUNTER
[facility]    Called pt regarding refill request for citalopram. Pt no longer being followed in clinic. LM with contact info if pt needs assistance.

## 2017-12-29 ENCOUNTER — TELEPHONE (OUTPATIENT)
Dept: INTERNAL MEDICINE | Facility: CLINIC | Age: 62
End: 2017-12-29

## 2017-12-29 RX ORDER — CITALOPRAM 20 MG/1
20 TABLET ORAL DAILY
Qty: 90 TABLET | Refills: 3 | Status: SHIPPED | OUTPATIENT
Start: 2017-12-29

## 2018-01-11 RX ORDER — ESOMEPRAZOLE MAGNESIUM 40 MG/1
CAPSULE, DELAYED RELEASE ORAL
Qty: 30 CAPSULE | Refills: 11 | Status: SHIPPED | OUTPATIENT
Start: 2018-01-11

## 2020-08-10 NOTE — PROGRESS NOTES
Subjective   Cody Simental is a 62 y.o. male.     Chief Complaint   Patient presents with   • Hospital follow-up         History of Present Illness   Mr. Parmar is here for hospital follow-up he had right middle and lower lung pneumonia with improvement and Hospital records reviewed.  Otherwise his lung cancer is radiologically quiescent and he has chronic nasal oxygen.  He is now off cigarettes.      The following portions of the patient's history were reviewed and updated as appropriate: allergies, current medications, past social history and problem list.    Review of Systems   Constitutional: Negative.    HENT: Negative.    Eyes: Negative.    Respiratory: Positive for shortness of breath.    Cardiovascular: Negative.    Gastrointestinal: Negative.    Endocrine: Negative.    Genitourinary: Negative.    Musculoskeletal: Negative.    Skin: Negative.    Allergic/Immunologic: Negative.    Neurological: Negative.    Hematological: Negative.    Psychiatric/Behavioral: Negative.        Objective   Vitals:    12/13/17 1433   BP: 104/72   Pulse: 102   Temp: 97.8 °F (36.6 °C)   SpO2: 91%     Physical Exam   Constitutional: He is oriented to person, place, and time. He appears well-developed and well-nourished.   HENT:   Head: Normocephalic and atraumatic.   Right Ear: Tympanic membrane and external ear normal.   Left Ear: Tympanic membrane and external ear normal.   Nose: Nose normal.   Mouth/Throat: Oropharynx is clear and moist.   Eyes: Conjunctivae and EOM are normal. Pupils are equal, round, and reactive to light.   Neck: Normal range of motion. Neck supple. No JVD present. No thyromegaly present.   Cardiovascular: Normal rate, regular rhythm, normal heart sounds and intact distal pulses.    Pulmonary/Chest: Effort normal and breath sounds normal.           Abdominal: Soft. Bowel sounds are normal.   Musculoskeletal: Normal range of motion.   Lymphadenopathy:     He has no cervical adenopathy.   Neurological: He is  alert and oriented to person, place, and time. No cranial nerve deficit. Coordination normal.   Skin: Skin is warm and dry. No rash noted.   Psychiatric: He has a normal mood and affect. His behavior is normal. Judgment and thought content normal.   Vitals reviewed.      Assessment/Plan   Problem List Items Addressed This Visit        Respiratory    Acute on chronic respiratory failure with hypoxia    Malignant neoplasm of middle lobe of right lung      Other Visit Diagnoses     Community acquired pneumonia of right middle lobe of lung    -  Primary      Continue nasal oxygen follow-up with pulmonary and also [facility] see him back in 6 months.        10-Aug-2020 20:41